# Patient Record
Sex: FEMALE | Race: WHITE | NOT HISPANIC OR LATINO | Employment: FULL TIME | URBAN - NONMETROPOLITAN AREA
[De-identification: names, ages, dates, MRNs, and addresses within clinical notes are randomized per-mention and may not be internally consistent; named-entity substitution may affect disease eponyms.]

---

## 2017-05-09 ENCOUNTER — OFFICE VISIT - GICH (OUTPATIENT)
Dept: INTERNAL MEDICINE | Facility: OTHER | Age: 52
End: 2017-05-09

## 2017-05-09 ENCOUNTER — HISTORY (OUTPATIENT)
Dept: INTERNAL MEDICINE | Facility: OTHER | Age: 52
End: 2017-05-09

## 2017-05-09 DIAGNOSIS — I10 ESSENTIAL (PRIMARY) HYPERTENSION: ICD-10-CM

## 2017-05-09 DIAGNOSIS — E66.3 OVERWEIGHT: ICD-10-CM

## 2017-05-09 DIAGNOSIS — F34.1 DYSTHYMIC DISORDER: ICD-10-CM

## 2017-05-09 DIAGNOSIS — E11.9 TYPE 2 DIABETES MELLITUS WITHOUT COMPLICATIONS (H): ICD-10-CM

## 2017-05-09 DIAGNOSIS — K21.9 GASTRO-ESOPHAGEAL REFLUX DISEASE WITHOUT ESOPHAGITIS: ICD-10-CM

## 2017-05-09 DIAGNOSIS — R06.83 SNORING: ICD-10-CM

## 2017-05-09 DIAGNOSIS — E11.42 TYPE 2 DIABETES MELLITUS WITH DIABETIC POLYNEUROPATHY (H): ICD-10-CM

## 2017-05-09 LAB
A/G RATIO - HISTORICAL: 1.6 (ref 1–2)
ALBUMIN SERPL-MCNC: 4.4 G/DL (ref 3.5–5.7)
ALP SERPL-CCNC: 61 IU/L (ref 34–104)
ALT (SGPT) - HISTORICAL: 36 IU/L (ref 7–52)
ANION GAP - HISTORICAL: 10 (ref 5–18)
AST SERPL-CCNC: 22 IU/L (ref 13–39)
BILIRUB SERPL-MCNC: 0.5 MG/DL (ref 0.3–1)
BUN SERPL-MCNC: 13 MG/DL (ref 7–25)
BUN/CREAT RATIO - HISTORICAL: 17
CALCIUM SERPL-MCNC: 9.4 MG/DL (ref 8.6–10.3)
CHLORIDE SERPLBLD-SCNC: 104 MMOL/L (ref 98–107)
CHOL/HDL RATIO - HISTORICAL: 4.34
CHOLESTEROL TOTAL: 178 MG/DL
CO2 SERPL-SCNC: 26 MMOL/L (ref 21–31)
CREAT SERPL-MCNC: 0.76 MG/DL (ref 0.7–1.3)
ESTIMATED AVERAGE GLUCOSE: 108 MG/DL
GFR IF NOT AFRICAN AMERICAN - HISTORICAL: >60 ML/MIN/1.73M2
GLOBULIN - HISTORICAL: 2.8 G/DL (ref 2–3.7)
GLUCOSE SERPL-MCNC: 123 MG/DL (ref 70–105)
HDLC SERPL-MCNC: 41 MG/DL (ref 23–92)
HEMOGLOBIN A1C MONITORING (POCT) - HISTORICAL: 5.4 % (ref 4–6.2)
LDLC SERPL CALC-MCNC: 104 MG/DL
NON-HDL CHOLESTEROL - HISTORICAL: 137 MG/DL
PATIENT STATUS - HISTORICAL: ABNORMAL
POTASSIUM SERPL-SCNC: 4.4 MMOL/L (ref 3.5–5.1)
PROT SERPL-MCNC: 7.2 G/DL (ref 6.4–8.9)
SODIUM SERPL-SCNC: 140 MMOL/L (ref 133–143)
TRIGL SERPL-MCNC: 167 MG/DL
TSH - HISTORICAL: 4.37 UIU/ML (ref 0.34–5.6)

## 2017-05-15 ENCOUNTER — HOSPITAL ENCOUNTER (OUTPATIENT)
Dept: RESPIRATORY THERAPY | Facility: OTHER | Age: 52
End: 2017-05-15
Attending: INTERNAL MEDICINE

## 2017-05-15 DIAGNOSIS — R06.83 SNORING: ICD-10-CM

## 2017-05-25 ENCOUNTER — AMBULATORY - GICH (OUTPATIENT)
Dept: FAMILY MEDICINE | Facility: OTHER | Age: 52
End: 2017-05-25

## 2017-05-25 DIAGNOSIS — E11.9 TYPE 2 DIABETES MELLITUS WITHOUT COMPLICATIONS (H): ICD-10-CM

## 2017-06-04 ENCOUNTER — COMMUNICATION - GICH (OUTPATIENT)
Dept: INTERNAL MEDICINE | Facility: OTHER | Age: 52
End: 2017-06-04

## 2017-06-04 DIAGNOSIS — R21 RASH AND OTHER NONSPECIFIC SKIN ERUPTION: ICD-10-CM

## 2017-06-26 ENCOUNTER — COMMUNICATION - GICH (OUTPATIENT)
Dept: INTERNAL MEDICINE | Facility: OTHER | Age: 52
End: 2017-06-26

## 2017-06-26 DIAGNOSIS — E11.65 TYPE 2 DIABETES MELLITUS WITH HYPERGLYCEMIA (H): ICD-10-CM

## 2017-07-02 ENCOUNTER — COMMUNICATION - GICH (OUTPATIENT)
Dept: INTERNAL MEDICINE | Facility: OTHER | Age: 52
End: 2017-07-02

## 2017-07-02 DIAGNOSIS — E11.65 TYPE 2 DIABETES MELLITUS WITH HYPERGLYCEMIA (H): ICD-10-CM

## 2017-07-27 ENCOUNTER — COMMUNICATION - GICH (OUTPATIENT)
Dept: INTERNAL MEDICINE | Facility: OTHER | Age: 52
End: 2017-07-27

## 2017-07-27 DIAGNOSIS — E11.65 TYPE 2 DIABETES MELLITUS WITH HYPERGLYCEMIA (H): ICD-10-CM

## 2017-07-28 ENCOUNTER — COMMUNICATION - GICH (OUTPATIENT)
Dept: INTERNAL MEDICINE | Facility: OTHER | Age: 52
End: 2017-07-28

## 2017-08-21 ENCOUNTER — COMMUNICATION - GICH (OUTPATIENT)
Dept: INTERNAL MEDICINE | Facility: OTHER | Age: 52
End: 2017-08-21

## 2017-08-21 DIAGNOSIS — I10 ESSENTIAL (PRIMARY) HYPERTENSION: ICD-10-CM

## 2017-08-21 DIAGNOSIS — F34.1 DYSTHYMIC DISORDER: ICD-10-CM

## 2017-10-02 ENCOUNTER — COMMUNICATION - GICH (OUTPATIENT)
Dept: INTERNAL MEDICINE | Facility: OTHER | Age: 52
End: 2017-10-02

## 2017-10-02 DIAGNOSIS — K21.9 GASTRO-ESOPHAGEAL REFLUX DISEASE WITHOUT ESOPHAGITIS: ICD-10-CM

## 2017-10-25 ENCOUNTER — COMMUNICATION - GICH (OUTPATIENT)
Dept: INTERNAL MEDICINE | Facility: OTHER | Age: 52
End: 2017-10-25

## 2017-10-25 DIAGNOSIS — E11.9 TYPE 2 DIABETES MELLITUS WITHOUT COMPLICATIONS (H): ICD-10-CM

## 2017-10-27 ENCOUNTER — COMMUNICATION - GICH (OUTPATIENT)
Dept: INTERNAL MEDICINE | Facility: OTHER | Age: 52
End: 2017-10-27

## 2017-10-30 ENCOUNTER — COMMUNICATION - GICH (OUTPATIENT)
Dept: INTERNAL MEDICINE | Facility: OTHER | Age: 52
End: 2017-10-30

## 2017-10-30 DIAGNOSIS — E11.9 TYPE 2 DIABETES MELLITUS WITHOUT COMPLICATIONS (H): ICD-10-CM

## 2017-11-29 ENCOUNTER — COMMUNICATION - GICH (OUTPATIENT)
Dept: INTERNAL MEDICINE | Facility: OTHER | Age: 52
End: 2017-11-29

## 2017-12-28 NOTE — TELEPHONE ENCOUNTER
Patient Information     Patient Name MRN Jackie Trinidad [id number] Female 1965      Telephone Encounter by Teresa Mullins at 10/25/2017  9:14 AM     Author:  Teresa Mullins Service:  (none) Author Type:  (none)     Filed:  10/25/2017  9:16 AM Encounter Date:  10/25/2017 Status:  Signed     :  Teresa Mullins            Please call patient regarding an alternate RX. The insurance will no longer cover Victoza.

## 2017-12-28 NOTE — TELEPHONE ENCOUNTER
Patient Information     Patient Name MRJackie Crawford [id number] Female 1965      Telephone Encounter by Richard Sow MD at 10/25/2017 11:47 AM     Author:  Richard Sow MD Service:  (none) Author Type:  Physician     Filed:  10/25/2017 11:47 AM Encounter Date:  10/25/2017 Status:  Signed     :  Richard Sow MD (Physician)            Stop the Victoza. I will start her back on once daily metformin, as well as once daily glipizide. Sent to her pharmacy. Hopefully she will not have any side effects with these. Recheck with me in 1 month to review blood sugars.

## 2017-12-28 NOTE — TELEPHONE ENCOUNTER
Patient Information     Patient Name MRN Sex Jackie Kemp [id number] Female 1965      Telephone Encounter by Bette Walker at 10/30/2017  6:11 PM     Author:  Bette Walker Service:  (none) Author Type:  (none)     Filed:  10/30/2017  6:13 PM Encounter Date:  10/30/2017 Status:  Signed     :  Bette Walker            Metformin SR (Glumetza) not covered by insurance.  Metformin ER (Glucophage ER) is covered by insurance.  Change Rx?  Bette Walker LPN ....................  10/30/2017   6:12 PM

## 2017-12-28 NOTE — TELEPHONE ENCOUNTER
Patient Information     Patient Name MRN Jackie Trinidad [id number] Female 1965      Telephone Encounter by Jeannette Grady LPN at 2017 11:19 AM     Author:  Jeannette Grady LPN Service:  (none) Author Type:  NURS- Licensed Practical Nurse     Filed:  2017 11:20 AM Encounter Date:  10/30/2017 Status:  Signed     :  Jeannette Grady LPN (NURS- Licensed Practical Nurse)            The pharmacy is wondering if the prescription can just be changed to the metformin ER that would be covered for the patient. PCP is out of the office until 2017.  Jeannette Grady LPN......2017  11:20 AM

## 2017-12-28 NOTE — TELEPHONE ENCOUNTER
Patient Information     Patient Name MRN Jackie Trinidad [id number] Female 1965      Telephone Encounter by Jeannette Grady LPN at 2017  8:29 AM     Author:  Jeannette Grady LPN Service:  (none) Author Type:  NURS- Licensed Practical Nurse     Filed:  2017  8:35 AM Encounter Date:  2017 Status:  Signed     :  Jeannette Grady LPN (NURS- Licensed Practical Nurse)            Contacted the patient she stated her insurance is going to be changing next week and would like a few months worth of her victoza sent in to her pharmacy for her.   Jeannette Grady LPN......2017  8:35 AM

## 2017-12-28 NOTE — TELEPHONE ENCOUNTER
Patient Information     Patient Name MRN Jackie Trinidad [id number] Female 1965      Telephone Encounter by Jeannette Grady LPN at 10/25/2017 11:28 AM     Author:  Jeannette Grady LPN Service:  (none) Author Type:  NURS- Licensed Practical Nurse     Filed:  10/25/2017 11:29 AM Encounter Date:  10/25/2017 Status:  Signed     :  Jeannette Grady LPN (NURS- Licensed Practical Nurse)            Contacted the patient and she stated it will cost her 1600 dollars a month for victoza and they did not give her any other cheaper alternatives. She is hoping Richard Sow MD can prescribed her something different. She does not care if she needs to go back on pill therapy if needed.  Jeannette Grady LPN......10/25/2017  11:29 AM

## 2017-12-28 NOTE — TELEPHONE ENCOUNTER
Patient Information     Patient Name MRN Sex Jackie Kemp [id number] Female 1965      Telephone Encounter by Glenna Potter RN at 2017 11:14 AM     Author:  Glenna Potter RN Service:  (none) Author Type:  NURS- Registered Nurse     Filed:  2017 11:16 AM Encounter Date:  2017 Status:  Signed     :  Glenna Potter RN (NURS- Registered Nurse)            Antifungals    Office visit in the past 12 months.    Last visit with KATHERINE KUNZ was on: 2017 in [facility]  Next visit with KATHERINE KUNZ is on: No future appointment listed with this provider  Next visit with Internal Medicine is on: No future appointment listed in this department    Max refill for 12 months from last office visit.    Prescription refilled per RN Medication Refill Policy.................... Glenna Potter RN ....................  2017   11:16 AM

## 2017-12-28 NOTE — TELEPHONE ENCOUNTER
Patient Information     Patient Name MRN Jackie Trinidad [id number] Female 1965      Telephone Encounter by Yi Hdz at 2017  3:07 PM     Author:  Yi Hdz Service:  (none) Author Type:  (none)     Filed:  2017  3:07 PM Encounter Date:  2017 Status:  Signed     :  Yi Hdz            [facility]- Patient has questions on her medication. She would like to talk to [facility] nurse.

## 2017-12-28 NOTE — TELEPHONE ENCOUNTER
Patient Information     Patient Name MRN Sex Jackie Kemp [id number] Female 1965      Telephone Encounter by Geno Patino RN at 2017  9:00 AM     Author:  Geno Patino RN Service:  (none) Author Type:  NURS- Registered Nurse     Filed:  2017  9:02 AM Encounter Date:  2017 Status:  Signed     :  Geno Patino RN (NURS- Registered Nurse)            Diabetic Supplies    Office visit in the past 12 months.    Last visit with KATHERINE KUNZ was on: 2017 in [facility]  Next visit with KATHERINE KUNZ is on: No future appointment listed with this provider  Next visit with Internal Medicine is on: No future appointment listed in this department    Max refill for 12 months from last office visit.  Always add ICD-9 code.    Needs not be listed on Med List to fill.  Need new RX every 12 months or if exceeds the limitations set by Medicare, then every 6 months.  Also when testing frequency is changed is there a need to obtain a new order.  A refill request does not need to be approved by the ordering physician-a beneficiary or their caregiver may request refills.  Physicians are not required to fill out additional forms such as home testing results for suppliers or provide additional documentation unless the supplier is audited and the  is requesting such documentation.    Prescription refilled per RN Medication Refill Policy.................... GENO PATINO RN ....................  2017   9:01 AM

## 2017-12-28 NOTE — TELEPHONE ENCOUNTER
"Patient Information     Patient Name MRJackie Crawford [id number] Female 1965      Telephone Encounter by Callie Blanchard RN at 2017  7:57 AM     Author:  Callie Blanchard RN Service:  (none) Author Type:  NURS- Registered Nurse     Filed:  2017  8:21 AM Encounter Date:  2017 Status:  Signed     :  Callie Blanchard RN (NURS- Registered Nurse)            Please review and advise.     Patient will be coming to [place] and wondering if she should be seen. She is on day #7 of watery diarrhea.    Patient called stating, 3 weeks ago, she finished her Victoza and then started 2 new medications, glipizide and metformin. Patient calls today complaining of diarrhea and is on her 7th day of this. Patient states \"my bottom hurts so bad, I am wondering if I have hemorrhoids on the inside.\" Patient is not sure if the diarrhea is related to new medications since she has been on them for 3 weeks now. She also states that yesterday she had a \"marble-sized bubble near her rectum, but now it is gone.\" Patient has had external hemorrhoids in the past, but the \"pain was nothing like this is now.\" Patient says when she goes to the bathroom, her pain is at a 8/10 and then 10/10 after going.\" Patient says that if she rests and puts her feet up after going, her pain will decrease to 5/10 after about 5-7 minutes. Patient is having 10-15 watery stools a day, and waking up in the night to go. Patient starting to be afraid to eat. Patient is having no problem eating, and has so that she can take her medications. Patient feels like she is staying hydrated and urine is pale, except her first void in morning, which is normal for her. Patient said pain \"was so bad, I almost went to ER last night.\" Patient has not taken anything for pain, such as ibuprofen, but has been using Preparation H.     Reason for Disposition    [1] SEVERE diarrhea (e.g., 7 or more times / day more than normal) AND [2] " "present > 24 hours (1 day)    Answer Assessment - Initial Assessment Questions  1. DIARRHEA SEVERITY: \"How bad is the diarrhea?\" \"How many extra stools have you had in the past 24 hours than normal?\"     - MILD: Few loose or mushy BMs; increase of 1-3 stools over normal daily number of stools; mild increase in ostomy output.    - MODERATE: Increase of 4-6 stools daily over normal; moderate increase in ostomy output.    - SEVERE (or Worst Possible): Increase of 7 or more stools daily over normal; moderate increase in ostomy output; incontinence.      10-15 times a day, waking up in the night    2. ONSET: \"When did the diarrhea begin?\"       7 days ago    3. BM CONSISTENCY: \"How loose or watery is the diarrhea?\"      Watery, even with though she is eating food    4. VOMITING: \"Are you also vomiting?\" If so, ask: \"How many times in the past 24 hours?\"       Afraid to eat, got plenty of fluids in yesterday, drank water    5. ABDOMINAL PAIN: \"Are you having any abdominal pain?\" If yes: \"What does it feel like?\" (e.g., crampy, dull, intermittent, constant)       No stomach pain, no trouble eating    6. ABDOMINAL PAIN SEVERITY: If present, ask: \"How bad is the pain?\"  (e.g., Scale 1-10; mild, moderate, or severe)     - MILD (1-3): doesn't interfere with normal activities, abdomen soft and not tender to touch      - MODERATE (4-7): interferes with normal activities or awakens from sleep, tender to touch      - SEVERE (8-10): excruciating pain, doubled over, unable to do any normal activities    0    7. ORAL INTAKE: If vomiting, \"Have you been able to drink liquids?\" \"How much fluids have you had in the past 24 hours?\"  Nauseous, eats fine, has been drinking fluids and keeping them down    8. HYDRATION: \"Any signs of dehydration?\" (e.g., dry mouth [not just dry lips], too weak to stand, dizziness, new weight loss) \"When did you last urinate?\"  Denies, urine normal clear, a little more yellow with 1st void of day  OTHER " "SYMPTOMS: \"Do you have any other symptoms?\" (e.g., fever, blood in stool)  Rectal Pain: 8/10, after going, a 10/10, lays with feet up, 5-7 min later pain goes to 5    Protocols used: ADULT DIARRHEA-A-AH    Callie Blanchard RN .............. 11/29/2017  8:20 AM            "

## 2017-12-28 NOTE — TELEPHONE ENCOUNTER
Patient Information     Patient Name MRN Sex Jackie Kemp [id number] Female 1965      Telephone Encounter by Sneha Disla DO at 2017 11:44 AM     Author:  Sneha Disla DO Service:  (none) Author Type:  PHYS- Osteopathic     Filed:  2017 11:45 AM Encounter Date:  10/30/2017 Status:  Signed     :  Sneha Disla DO (PHYS- Osteopathic)            Yes. New Rx sent

## 2017-12-28 NOTE — TELEPHONE ENCOUNTER
Patient Information     Patient Name MRN Sex Jackie Kemp [id number] Female 1965      Telephone Encounter by Geno Patino RN at 2017 12:15 PM     Author:  Geno Patino RN Service:  (none) Author Type:  NURS- Registered Nurse     Filed:  2017 12:16 PM Encounter Date:  2017 Status:  Signed     :  Geno Patino RN (NURS- Registered Nurse)            Depression-in adults 18 and over  SSRI    Office visit in the past 12 months or as indicated in chart.  Should have clinic visit 1-2 months after initial prescription.    Last visit with KATHERINE KUNZ was on: 2017 in [facility]  Next visit with KATHERINE KUNZ is on: No future appointment listed with this provider  Next visit with Internal Medicine is on: No future appointment listed in this department    Max refills 12 months from last office visit or per providers notes.    Ace Inhibitors    Office visit in the past 12 months or per provider note.    Lab test requirements:  Creatinine and Potassium annually, if ordering lab, order BMP.  CREATININE (mg/dL)    Date Value   2017 0.76     POTASSIUM (mmol/L)    Date Value   2017 4.4       Max refill for 12 months from last office visit or per provider note  Prescription refilled per RN Medication Refill Policy.................... GENO PATINO RN ....................  2017   12:15 PM

## 2017-12-28 NOTE — TELEPHONE ENCOUNTER
Patient Information     Patient Name MRN Sex Jackie Kemp [id number] Female 1965      Telephone Encounter by Jeannette Grady LPN at 10/25/2017 12:33 PM     Author:  Jeannette Grady LPN Service:  (none) Author Type:  NURS- Licensed Practical Nurse     Filed:  10/25/2017 12:34 PM Encounter Date:  10/25/2017 Status:  Signed     :  Jeannette Grady LPN (NURS- Licensed Practical Nurse)            The patient was contacted and given the information below. She will call and set up a follow up for one month.  Jeannette Grady LPN......10/25/2017  12:34 PM

## 2017-12-28 NOTE — TELEPHONE ENCOUNTER
Patient Information     Patient Name MRN Jacike Trinidad [id number] Female 1965      Telephone Encounter by Glenna Potter RN at 10/25/2017  1:13 PM     Author:  Glenna Potter RN Service:  (none) Author Type:  NURS- Registered Nurse     Filed:  10/25/2017  1:20 PM Encounter Date:  10/25/2017 Status:  Signed     :  Glenna Potter RN (NURS- Registered Nurse)            eRx did not transmit correctly. Clarified with Kun Potter RN........10/25/2017 1:20 PM

## 2017-12-28 NOTE — TELEPHONE ENCOUNTER
Patient Information     Patient Name MRN Jackie Trinidad [id number] Female 1965      Telephone Encounter by Jeannette Grady LPN at 2017  9:13 AM     Author:  Jeannette Grady LPN Service:  (none) Author Type:  NURS- Licensed Practical Nurse     Filed:  2017  9:14 AM Encounter Date:  2017 Status:  Signed     :  Jeannette Grady LPN (NURS- Licensed Practical Nurse)            Contacted the patient and gave her the information below. The patient stated she would try one tablet and make a follow up appointment.  Jeannette Grady LPN......2017  9:14 AM

## 2017-12-28 NOTE — TELEPHONE ENCOUNTER
Patient Information     Patient Name MRN Jackie Trinidad [id number] Female 1965      Telephone Encounter by Jeannette Grady LPN at 2017  2:57 PM     Author:  Jeannette Grady LPN Service:  (none) Author Type:  NURS- Licensed Practical Nurse     Filed:  2017  2:58 PM Encounter Date:  2017 Status:  Signed     :  Jeannette Grady LPN (NURS- Licensed Practical Nurse)            The patient was calling to find out about her victoza prescription. The patient was notified that the refills were sent in to her pharmacy.  Jeannette Grady LPN......2017  2:58 PM

## 2017-12-28 NOTE — TELEPHONE ENCOUNTER
Patient Information     Patient Name MRN Jackie Trinidad [id number] Female 1965      Telephone Encounter by Callie Galo RN at 10/27/2017  3:30 PM     Author:  Callie Galo RN Service:  (none) Author Type:  NURS- Registered Nurse     Filed:  10/27/2017  3:32 PM Encounter Date:  10/27/2017 Status:  Signed     :  Callie Galo RN (NURS- Registered Nurse)            Fax received requesting a different formula covered by insurance. Per medication list this is a duplicate request which was changed by Richard Sow MD on 10-25-17.  Already addressed.   Callie Galo RN ....................  10/27/2017   3:32 PM

## 2017-12-28 NOTE — TELEPHONE ENCOUNTER
Patient Information     Patient Name MRN Sex Jackie Kemp [id number] Female 1965      Telephone Encounter by Geno Patino RN at 2017  8:37 AM     Author:  Geno Patino RN Service:  (none) Author Type:  NURS- Registered Nurse     Filed:  2017  8:38 AM Encounter Date:  2017 Status:  Signed     :  Geno Patino RN (NURS- Registered Nurse)            Diabetic Supplies    Office visit in the past 12 months.    Last visit with KATHERINE KUNZ was on: 2017 in [facility]  Next visit with KATHERINE KUNZ is on: No future appointment listed with this provider  Next visit with Internal Medicine is on: No future appointment listed in this department    Max refill for 12 months from last office visit.  Always add ICD-9 code.    Needs not be listed on Med List to fill.  Need new RX every 12 months or if exceeds the limitations set by Medicare, then every 6 months.  Also when testing frequency is changed is there a need to obtain a new order.  A refill request does not need to be approved by the ordering physician-a beneficiary or their caregiver may request refills.  Physicians are not required to fill out additional forms such as home testing results for suppliers or provide additional documentation unless the supplier is audited and the  is requesting such documentation.    Prescription refilled per RN Medication Refill Policy.................... GENO PATINO RN ....................  2017   8:37 AM

## 2018-01-04 NOTE — PROGRESS NOTES
Patient Information     Patient Name MRN Jackie Trinidad [id number] Female 1965      Progress Notes by Richard Sow MD at 2017  7:40 AM     Author:  Richard Sow MD Service:  (none) Author Type:  Physician     Filed:  2017  8:18 AM Encounter Date:  2017 Status:  Signed     :  Richard Sow MD (Physician)            SUBJECTIVE:    Jackie Donahue is a 51 y.o. female who presents for comprehensive review of their multiple medical problems and review of medications, renewal of medications and update on necessary health maintenance issues.      HPI Comments: She is here basically for a complete evaluation. She has type 2 diabetes mellitus. Historically, her control has been very good. She is currently on Victoza which she takes on a daily basis. She does not have any difficulties or problems administering this. Her diabetes has been well-controlled in general she thinks. She knows that she needs to have better dietary compliance and exercise. She would like to see the dietitian to review this further.    She also has a history of reflux disease. She is stable on omeprazole. She is up-to-date with colonoscopy. She is up-to-date with immunizations and mammogram. She has a history of chronic anxiety and is on Celexa. She feels as though her mood is overall doing fine and well.    She wonders if she has some adenopathy in her neck and she would like that checked. I talked to her about sleep apnea, she does apparently snore and she doesn't think that she sleeps very well.    He spent time today reconciling her medications and updating her past medical history, past surgical history, family history and social history.      Allergies     Allergen  Reactions     Celebrex [Celecoxib] *Unknown     Flagyl [Nitroimidazoles] Hives and Dyspnea   ,   Current Outpatient Prescriptions     Medication  Sig     ACCU-CHEK SMARTVIEW TEST STRIP strip CHECK GLUCOSE TWICE DAILY     BD INSULIN PEN  "NEEDLE UF 31 gauge x 5/16\" FOR USE AT HOME ONCE DAILY WITH VICTOZA     CHOLECALCIFEROL, VITAMIN D3, (VITAMIN D3 ORAL) Take 1 Tab by mouth.       citalopram (CELEXA) 20 mg tablet Take 1 tablet by mouth once daily.     ketoconazole 2% topical (NIZORAL) cream APPLY A THIN FILM OF CREAM TOPICALLY TWICE DAILY.     lancets (ACCU-CHEK FASTCLIX) Use one to check glucose everyday .02     liraglutide (VICTOZA 2-HAKAN) 0.6 mg/0.1 mL (18 mg/3 mL) injection Inject 1.2 mg subcutaneous once daily.     lisinopril (PRINIVIL; ZESTRIL) 20 mg tablet Take 1 tablet by mouth once daily.     omeprazole (PRILOSEC) 20 mg Delayed-Release capsule Take 1 capsule by mouth once daily before a meal.     No current facility-administered medications for this visit.      Medications have been reviewed by me and are current to the best of my knowledge and ability. ,   Past Medical History:     Diagnosis  Date     Anxiety state, unspecified      Depressive disorder, not elsewhere classified      Diabetes mellitus, type II (HC)      Esophageal reflux      Hypertension 11/6/2014   ,   Patient Active Problem List      Diagnosis Date Noted     Diabetic peripheral neuropathy associated with type 2 diabetes mellitus (HC) 07/19/2016     Type 2 diabetes mellitus without complication (HC) 03/14/2016     Hypertension 11/06/2014     OVERWEIGHT 05/16/2012     T M J 05/12/2011     TINEA CORPORIS 05/12/2011     ANXIETY DEPRESSION      GASTROESOPHAGEAL REFLUX DISEASE    ,   Past Surgical History:      Procedure  Laterality Date     CARPAL TUNNEL RELEASE Bilateral      CERVICAL DISKECTOMY  2008    Fusion, with Dr. Russo       CHOLECYSTECTOMY       COLONOSCOPY SCREENING  2010    Normal, neg. for occult colitis, due 2020       DILATION AND CURETTAGE  2004    Also endometrial ablation       ESOPHAGOGASTRODUODENOSCOPY  2011    Reactive gastropathy, mild to moderate refulux changes.       HYSTERECTOMY  2006    LAVH/LSO       PELVIC LAPAROSCOPY  2007    diagnostic " laparoscopy With lysis of adhesions       TUBAL LIGATION      and   Social History      Substance Use Topics        Smoking status:  Former Smoker     Types: Cigarettes     Quit date: 2002     Smokeless tobacco:  Not on file     Alcohol use  No     Family Status     Relation  Status     Mother      Father Alive     Brother Alive     Maternal Grandmother      Maternal Grandfather      Paternal Grandmother      Paternal Grandfather      Other      Other      Social History     Social History        Marital status:       Spouse name: N/A     Number of children:  N/A     Years of education:  N/A     Social History Main Topics        Smoking status:  Former Smoker     Types: Cigarettes     Quit date: 2002     Smokeless tobacco:  None     Alcohol use  No     Drug use:  No     Sexual activity:  Not Asked     Other Topics  Concern     None      Social History Narrative     , two children, working at [employer]. Lives in [place].       employed at [employer].                                           REVIEW OF SYSTEMS:  Review of Systems   Constitutional: Negative for chills, diaphoresis, fever, malaise/fatigue and weight loss.   HENT: Negative for congestion, ear pain, nosebleeds, sore throat and tinnitus.    Eyes: Negative for blurred vision, double vision, photophobia, pain, discharge and redness.   Respiratory: Negative for cough, hemoptysis, sputum production, shortness of breath and wheezing.    Cardiovascular: Negative for chest pain, palpitations, orthopnea, claudication, leg swelling and PND.   Gastrointestinal: Negative for abdominal pain, blood in stool, constipation, diarrhea, heartburn, nausea and vomiting.   Genitourinary: Negative for dysuria, flank pain and hematuria.   Musculoskeletal: Negative for back pain, joint pain, myalgias and neck pain.   Skin: Negative for itching and rash.   Neurological: Negative for dizziness, tingling, tremors, speech change, loss of  "consciousness, weakness and headaches.   Psychiatric/Behavioral: Negative for depression, hallucinations, memory loss, substance abuse and suicidal ideas. The patient is not nervous/anxious.        OBJECTIVE:  /88  Pulse 74  Ht 1.645 m (5' 4.75\")  Wt 97.1 kg (214 lb)  Breastfeeding? No  BMI 35.89 kg/m2    EXAM:   Physical Exam   Constitutional: She is oriented to person, place, and time and well-developed, well-nourished, and in no distress. No distress.   HENT:   Head: Normocephalic and atraumatic.   Right Ear: Tympanic membrane and external ear normal.   Left Ear: Tympanic membrane and external ear normal.   Nose: Nose normal.   Mouth/Throat: Oropharynx is clear and moist. No oropharyngeal exudate or posterior oropharyngeal erythema.   Eyes: Conjunctivae are normal. Pupils are equal, round, and reactive to light. Right eye exhibits no discharge. Left eye exhibits no discharge. No scleral icterus.   Neck: Normal range of motion. Neck supple. No JVD present. Carotid bruit is not present. No tracheal deviation present. No thyroid mass and no thyromegaly present.   Cardiovascular: Normal rate, regular rhythm and normal heart sounds.  Exam reveals no gallop and no friction rub.    No murmur heard.  Pulmonary/Chest: Effort normal and breath sounds normal. No respiratory distress. She has no decreased breath sounds. She has no wheezes. She has no rhonchi. She has no rales. She exhibits no tenderness.   Abdominal: Soft. Bowel sounds are normal. She exhibits no distension and no mass. There is no hepatosplenomegaly. There is no tenderness. There is no rebound and no guarding.   Musculoskeletal: Normal range of motion. She exhibits no edema or tenderness.   Lymphadenopathy:     She has no cervical adenopathy.   Neurological: She is alert and oriented to person, place, and time. She displays normal reflexes. No cranial nerve deficit. Gait normal. Coordination normal.   Skin: Skin is warm and dry. No rash noted. " She is not diaphoretic. No erythema.   Psychiatric: Affect normal.   Nursing note and vitals reviewed.      ASSESSMENT/PLAN:    ICD-10-CM    1. Type 2 diabetes mellitus without complication, without long-term current use of insulin (HC) E11.9 HEMOGLOBIN A1C MONITORING (POCT)      LIPID PANEL      COMPLETE METABOLIC PANEL      TSH      AMB CONSULT TO DIETICIAN      CANCELED: T4,FREE   2. Diabetic peripheral neuropathy associated with type 2 diabetes mellitus (HC) E11.42    3. Hypertension I10    4. Overweight E66.3    5. ANXIETY DEPRESSION F34.1    6. Gastroesophageal reflux disease without esophagitis K21.9    7. Snoring R06.83 OXIMETRY OVERNIGHT STUDY        Plan:  Aside from her weight, her exam today is really quite unremarkable. She will continue with her same medications without change. Complete lab is drawn and pending, I will send a letter with the results. Dietary consult is requested. Overnight oximetry is scheduled, I will let her know the results. She will try to work hard on exercise and weight reduction. Follow-up will be dependent on the results of her lab and how she is feeling. Of note is that I did not appreciate any abnormal adenopathy today in her neck area, she will monitor this and follow up on this if needed.

## 2018-01-04 NOTE — NURSING NOTE
Patient Information     Patient Name MRN Sex Jackie Kemp [id number] Female 1965      Nursing Note by Jeannette Grady LPN at 2017  7:40 AM     Author:  Jeannette Grady LPN Service:  (none) Author Type:  NURS- Licensed Practical Nurse     Filed:  2017  7:56 AM Encounter Date:  2017 Status:  Signed     :  Jeannette Grady LPN (NURS- Licensed Practical Nurse)            The patient is here today to have a diabetic check up and discuss recent lumps she feels in there throat.  Jeannette Grady LPN......2017  7:47 AM

## 2018-01-05 NOTE — PROGRESS NOTES
"Patient Information     Patient Name MRN Jackie Trinidad [id number] Female 1965      Progress Notes by Klinefelter, Kristin K, RD at 2017 11:30 AM     Author:  Klinefelter, Kristin K, RD Service:  (none) Author Type:  NUTR- Registered Dietitian     Filed:  2017 12:31 PM Encounter Date:  2017 Status:  Signed     :  Klinefelter, Kristin K, RD (NUTR- Registered Dietitian)            MEDICAL NUTRITION THERAPY  INITIAL VISIT      Provider: Dr. Sow    Reason for referral: DM Type 2, well controlled. Overweight, BMI 35      Assessment:  Client History: Liudmila reports that she is mostly concerned about her weight. She identifies emotional eating, especially at night. 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 DM under good control. She has quit smoking and feels good about that.   Body mass index is 35.62 kg/(m^2).  Weight today 212.4#    24hr Diet Recall:  Time: Food/Drink:   bfast Cereal or bagel   lunch Out \"$5 lunch\" = 700 calories or so   dinner At home, many convenient items   snacks Excessive intake at night   beverages Water milk, no pop     Weekly Activity:  Cardio: Resistance: Stretching:   none none none      Nutrition Diagnosis:  Energy Balance: Predicted excessive energy intake  Knowledge and Beliefs: emotional eating related to stress/behaviors as evidenced by recall, self-reporting  Patient is motivated and shows willingness to change.    Intervention:  Nutrition Prescription  Nutrition Intervention: Meals and Snacks:  General/healthful diet:  Modify composition of meals/snacks: Energy-modified diet  Carbohydrate-modified diet  Nutrition Counseling:  Strategies:  Goal setting  Self-monitoring  Problem solving  Goal: (1) 150 minutes of exercise per week. She will work on 2, 10 minute brisk walks per day at work.  (2) Meal plan and write down food choices for review. Meal plan for 1,200 calories discussed  (3) Plan 1, 150 calorie snack with " protein in the evening. Think of strategies to reduce emotional eating. We will cover this at our follow-up visit.    Education Materials Provided:   Food list for meal planning  Recipes  Snack lists    Monitoring and Evaluation:  Food Intake  Weight  Biochemical Data, Medical Tests and Procedures    Follow Up: 4 week(s)    Time spent with patient: 45 minutes.   Patient encouraged to call with further questions. Contact information provided.    Kristin K Klinefelter, RD ....................  5/25/2017   12:20 PM

## 2018-01-12 ENCOUNTER — HISTORY (OUTPATIENT)
Dept: FAMILY MEDICINE | Facility: OTHER | Age: 53
End: 2018-01-12

## 2018-01-12 ENCOUNTER — OFFICE VISIT - GICH (OUTPATIENT)
Dept: FAMILY MEDICINE | Facility: OTHER | Age: 53
End: 2018-01-12

## 2018-01-12 ENCOUNTER — COMMUNICATION - GICH (OUTPATIENT)
Dept: INTERNAL MEDICINE | Facility: OTHER | Age: 53
End: 2018-01-12

## 2018-01-12 DIAGNOSIS — R51.9 HEADACHE: ICD-10-CM

## 2018-01-12 DIAGNOSIS — R22.0 LOCALIZED SWELLING, MASS, AND LUMP OF HEAD: ICD-10-CM

## 2018-01-12 LAB
ABSOLUTE BASOPHILS - HISTORICAL: 0.1 THOU/CU MM
ABSOLUTE EOSINOPHILS - HISTORICAL: 0.2 THOU/CU MM
ABSOLUTE IMMATURE GRANULOCYTES(METAS,MYELOS,PROS) - HISTORICAL: 0 THOU/CU MM
ABSOLUTE LYMPHOCYTES - HISTORICAL: 3 THOU/CU MM (ref 0.9–2.9)
ABSOLUTE MONOCYTES - HISTORICAL: 0.5 THOU/CU MM
ABSOLUTE NEUTROPHILS - HISTORICAL: 4.8 THOU/CU MM (ref 1.7–7)
ANION GAP - HISTORICAL: 12 (ref 5–18)
BASOPHILS # BLD AUTO: 0.8 %
BUN SERPL-MCNC: 13 MG/DL (ref 7–25)
BUN/CREAT RATIO - HISTORICAL: 17
CALCIUM SERPL-MCNC: 8.3 MG/DL (ref 8.6–10.3)
CHLORIDE SERPLBLD-SCNC: 105 MMOL/L (ref 98–107)
CO2 SERPL-SCNC: 26 MMOL/L (ref 21–31)
CREAT SERPL-MCNC: 0.75 MG/DL (ref 0.7–1.3)
EOSINOPHIL NFR BLD AUTO: 2.3 %
ERYTHROCYTE [DISTWIDTH] IN BLOOD BY AUTOMATED COUNT: 12.2 % (ref 11.5–15.5)
ERYTHROCYTE [SEDIMENTATION RATE] IN BLOOD: 9 MM/HR
GFR IF NOT AFRICAN AMERICAN - HISTORICAL: >60 ML/MIN/1.73M2
GLUCOSE SERPL-MCNC: 152 MG/DL (ref 70–105)
HCT VFR BLD AUTO: 33 % (ref 33–51)
HEMOGLOBIN: 11.2 G/DL (ref 12–16)
IMMATURE GRANULOCYTES(METAS,MYELOS,PROS) - HISTORICAL: 0.2 %
LYMPHOCYTES NFR BLD AUTO: 34.7 % (ref 20–44)
MCH RBC QN AUTO: 31.2 PG (ref 26–34)
MCHC RBC AUTO-ENTMCNC: 33.9 G/DL (ref 32–36)
MCV RBC AUTO: 92 FL (ref 80–100)
MONOCYTES NFR BLD AUTO: 6.3 %
NEUTROPHILS NFR BLD AUTO: 55.7 % (ref 42–72)
PLATELET # BLD AUTO: 246 THOU/CU MM (ref 140–440)
PMV BLD: 12 FL (ref 6.5–11)
POTASSIUM SERPL-SCNC: 3.8 MMOL/L (ref 3.5–5.1)
RED BLOOD COUNT - HISTORICAL: 3.59 MIL/CU MM (ref 4–5.2)
SODIUM SERPL-SCNC: 143 MMOL/L (ref 133–143)
WHITE BLOOD COUNT - HISTORICAL: 8.6 THOU/CU MM (ref 4.5–11)

## 2018-01-12 ASSESSMENT — ANXIETY QUESTIONNAIRES
4. TROUBLE RELAXING: NOT AT ALL
7. FEELING AFRAID AS IF SOMETHING AWFUL MIGHT HAPPEN: NOT AT ALL
3. WORRYING TOO MUCH ABOUT DIFFERENT THINGS: NOT AT ALL
GAD7 TOTAL SCORE: 0
6. BECOMING EASILY ANNOYED OR IRRITABLE: NOT AT ALL
1. FEELING NERVOUS, ANXIOUS, OR ON EDGE: NOT AT ALL
5. BEING SO RESTLESS THAT IT IS HARD TO SIT STILL: NOT AT ALL
2. NOT BEING ABLE TO STOP OR CONTROL WORRYING: NOT AT ALL

## 2018-01-12 ASSESSMENT — PATIENT HEALTH QUESTIONNAIRE - PHQ9: SUM OF ALL RESPONSES TO PHQ QUESTIONS 1-9: 3

## 2018-01-17 ENCOUNTER — COMMUNICATION - GICH (OUTPATIENT)
Dept: INTERNAL MEDICINE | Facility: OTHER | Age: 53
End: 2018-01-17

## 2018-01-17 DIAGNOSIS — E11.9 TYPE 2 DIABETES MELLITUS WITHOUT COMPLICATIONS (H): ICD-10-CM

## 2018-01-17 PROBLEM — K21.9 GASTROESOPHAGEAL REFLUX DISEASE: Status: ACTIVE | Noted: 2018-01-17

## 2018-01-17 PROBLEM — F34.1 DYSTHYMIC DISORDER: Status: ACTIVE | Noted: 2018-01-17

## 2018-01-17 RX ORDER — CITALOPRAM HYDROBROMIDE 20 MG/1
20 TABLET ORAL DAILY
COMMUNITY
Start: 2017-08-22 | End: 2018-06-11

## 2018-01-17 RX ORDER — LANCETS
EACH MISCELLANEOUS
COMMUNITY
Start: 2015-03-16 | End: 2018-09-25

## 2018-01-17 RX ORDER — LIRAGLUTIDE 6 MG/ML
1.2 INJECTION SUBCUTANEOUS DAILY
COMMUNITY
Start: 2017-07-28 | End: 2018-03-19

## 2018-01-17 RX ORDER — GLIPIZIDE 5 MG/1
TABLET ORAL
COMMUNITY
Start: 2017-10-25 | End: 2018-09-25

## 2018-01-17 RX ORDER — LISINOPRIL 20 MG/1
20 TABLET ORAL DAILY
COMMUNITY
Start: 2017-08-22 | End: 2018-07-03

## 2018-01-17 RX ORDER — METFORMIN HCL 500 MG
TABLET, EXTENDED RELEASE 24 HR ORAL
COMMUNITY
Start: 2017-11-01 | End: 2018-03-19

## 2018-01-17 RX ORDER — KETOCONAZOLE 20 MG/G
CREAM TOPICAL
COMMUNITY
Start: 2017-06-06 | End: 2018-03-19

## 2018-01-18 ENCOUNTER — AMBULATORY - GICH (OUTPATIENT)
Dept: LAB | Facility: OTHER | Age: 53
End: 2018-01-18

## 2018-01-18 DIAGNOSIS — E11.9 TYPE 2 DIABETES MELLITUS WITHOUT COMPLICATIONS (H): ICD-10-CM

## 2018-01-18 LAB
ESTIMATED AVERAGE GLUCOSE: 123 MG/DL
HEMOGLOBIN A1C MONITORING (POCT) - HISTORICAL: 5.9 % (ref 4–6.2)

## 2018-01-27 VITALS
HEART RATE: 74 BPM | BODY MASS INDEX: 35.65 KG/M2 | DIASTOLIC BLOOD PRESSURE: 88 MMHG | HEIGHT: 65 IN | WEIGHT: 214 LBS | SYSTOLIC BLOOD PRESSURE: 118 MMHG

## 2018-01-27 VITALS — BODY MASS INDEX: 35.82 KG/M2 | WEIGHT: 212.4 LBS

## 2018-02-09 ENCOUNTER — OFFICE VISIT - GICH (OUTPATIENT)
Dept: INTERNAL MEDICINE | Facility: OTHER | Age: 53
End: 2018-02-09

## 2018-02-09 ENCOUNTER — HISTORY (OUTPATIENT)
Dept: INTERNAL MEDICINE | Facility: OTHER | Age: 53
End: 2018-02-09

## 2018-02-09 VITALS
BODY MASS INDEX: 36.15 KG/M2 | WEIGHT: 217 LBS | TEMPERATURE: 97.5 F | HEIGHT: 65 IN | DIASTOLIC BLOOD PRESSURE: 80 MMHG | HEART RATE: 64 BPM | SYSTOLIC BLOOD PRESSURE: 136 MMHG

## 2018-02-09 DIAGNOSIS — I10 ESSENTIAL (PRIMARY) HYPERTENSION: ICD-10-CM

## 2018-02-09 DIAGNOSIS — E11.9 TYPE 2 DIABETES MELLITUS WITHOUT COMPLICATIONS (H): ICD-10-CM

## 2018-02-09 ASSESSMENT — ANXIETY QUESTIONNAIRES
1. FEELING NERVOUS, ANXIOUS, OR ON EDGE: NOT AT ALL
2. NOT BEING ABLE TO STOP OR CONTROL WORRYING: NOT AT ALL
7. FEELING AFRAID AS IF SOMETHING AWFUL MIGHT HAPPEN: NOT AT ALL
5. BEING SO RESTLESS THAT IT IS HARD TO SIT STILL: NOT AT ALL
4. TROUBLE RELAXING: NOT AT ALL
3. WORRYING TOO MUCH ABOUT DIFFERENT THINGS: NOT AT ALL
6. BECOMING EASILY ANNOYED OR IRRITABLE: NOT AT ALL
GAD7 TOTAL SCORE: 0

## 2018-02-11 ASSESSMENT — ANXIETY QUESTIONNAIRES: GAD7 TOTAL SCORE: 0

## 2018-02-11 ASSESSMENT — PATIENT HEALTH QUESTIONNAIRE - PHQ9: SUM OF ALL RESPONSES TO PHQ QUESTIONS 1-9: 3

## 2018-02-12 VITALS
DIASTOLIC BLOOD PRESSURE: 86 MMHG | WEIGHT: 210.2 LBS | BODY MASS INDEX: 34.98 KG/M2 | SYSTOLIC BLOOD PRESSURE: 134 MMHG | HEART RATE: 72 BPM

## 2018-02-12 NOTE — TELEPHONE ENCOUNTER
Patient Information     Patient Name MRN Sex Jackie Kemp [id number] Female 1965      Telephone Encounter by Roge Garvey RN at 2018  9:11 AM     Author:  Roge Garvey RN Service:  (none) Author Type:  NURS- Registered Nurse     Filed:  2018 10:22 AM Encounter Date:  2018 Status:  Signed     :  Roge Garvey RN (NURS- Registered Nurse)            Writer was approached by HALEY Gonzalez. Was advised that patient was being seen by PUSHPA Villasenor today for face swelling and a headache. HALEY Gonzalez asked for this writer to contact patient to complete triage to see if office visit was appropriate.    Call placed to patient. Patient did not answer at this time. Writer will call at a later time.    Roge Garvey RN ....................  2018   9:14 AM

## 2018-02-12 NOTE — TELEPHONE ENCOUNTER
Patient Information     Patient Name MRN Sex Jackie Kemp [id number] Female 1965      Telephone Encounter by Roge Garvey RN at 2018 10:59 AM     Author:  Roge Garvey RN Service:  (none) Author Type:  NURS- Registered Nurse     Filed:  2018 11:02 AM Encounter Date:  2018 Status:  Signed     :  Roge Garvey RN (NURS- Registered Nurse)            Writer attempted to contact patient again at listed number x 2. Was unable to reach patient at this time. Did leave a message for patient to return this writer's call. Will await return call. Writer will close this encounter as well as patient does have an appointment scheduled for her concerns at 1430 today with PUSHPA Villasenor.    Roge Garvey RN ....................  2018   11:01 AM

## 2018-02-13 ASSESSMENT — ANXIETY QUESTIONNAIRES: GAD7 TOTAL SCORE: 0

## 2018-02-13 NOTE — PROGRESS NOTES
Patient Information     Patient Name MRN Jackie Trinidad [id number] Female 1965      Progress Notes by Richard Sow MD at 2018  8:40 AM     Author:  Richard Sow MD Service:  (none) Author Type:  Physician     Filed:  2018  8:47 AM Encounter Date:  2018 Status:  Signed     :  Richadr Sow MD (Physician)            SUBJECTIVE:    Jackie Donahue is a 52 y.o. female who presents for recheck on DM.    HPI Comments: She is here today for follow-up on her diabetes. She had diarrhea with metformin so I switched her to glipizide one daily. She seems to be doing fine with that. She has not had any further diarrhea for medication although she has had somewhat of a rough winter with recurrent episodes of viral gastroenteritis. She is currently feeling well. She does not have any hypoglycemic problems with the new medication. Her weight is stable. Her blood pressure is acceptable. Her recent A1c was excellent. Unfortunately, her  got laid off permanently and lost his insurance and they are now on her insurance. She has very poor coverage with this which makes any medical visits and tests essentially out of the pocket expenses for them.      Allergies     Allergen  Reactions     Celebrex [Celecoxib] *Unknown     Flagyl [Nitroimidazoles] Hives and Dyspnea     Metformin Diarrhea   ,   Current Outpatient Prescriptions     Medication  Sig     blood sugar diagnostic (ACCU-CHEK SMARTVIEW TEST STRIP) strip Dx code e11.9  Check glucose twice daily     CHOLECALCIFEROL, VITAMIN D3, (VITAMIN D3 ORAL) Take 1 Tab by mouth.       citalopram (CELEXA) 20 mg tablet TAKE ONE TABLET BY MOUTH ONCE DAILY     glipiZIDE (GLUCOTROL) 5 mg tablet Take 1 tablet by mouth once daily before a meal.     ketoconazole 2% topical (NIZORAL) cream APPLY A THIN FILM OF CREAM TOPICALLY TWICE DAILY.     lancets (ACCU-CHEK FASTCLIX) Use one to check glucose everyday .02     lisinopril (PRINIVIL; ZESTRIL)  20 mg tablet TAKE ONE TABLET BY MOUTH ONCE DAILY     omeprazole (PRILOSEC) 20 mg Delayed-Release capsule TAKE ONE CAPSULE BY MOUTH ONCE DAILY BEFORE A MEAL     No current facility-administered medications for this visit.      Medications have been reviewed by me and are current to the best of my knowledge and ability. ,   Past Medical History:     Diagnosis  Date     Anxiety state, unspecified      Depressive disorder, not elsewhere classified      Diabetes mellitus, type II (HC)      Esophageal reflux      Hypertension 11/6/2014    and   Patient Active Problem List      Diagnosis Date Noted     Diabetic peripheral neuropathy associated with type 2 diabetes mellitus (HC) 07/19/2016     Type 2 diabetes mellitus without complication (HC) 03/14/2016     Hypertension 11/06/2014     OVERWEIGHT 05/16/2012     T M J 05/12/2011     TINEA CORPORIS 05/12/2011     ANXIETY DEPRESSION      GASTROESOPHAGEAL REFLUX DISEASE        REVIEW OF SYSTEMS:  Review of Systems   All other systems reviewed and are negative.      OBJECTIVE:  /86 (Cuff Site: Right Arm, Position: Sitting, Cuff Size: Adult Regular)  Pulse 72  Wt 95.3 kg (210 lb 3.2 oz)  BMI 34.98 kg/m2    EXAM:   Physical Exam   Constitutional: She is well-developed, well-nourished, and in no distress. No distress.   Skin: She is not diaphoretic.   Nursing note and vitals reviewed.      ASSESSMENT/PLAN:    ICD-10-CM    1. Type 2 diabetes mellitus without complication, without long-term current use of insulin (HC) E11.9    2. Hypertension I10         Plan:  She appears to be doing well at this time. Medications will continue without change. Due to her insurance concerns, we will keep her charges and visits to a minimum as able to try to keep her expenses down. She will notify me if she has problems or concerns.

## 2018-02-13 NOTE — NURSING NOTE
Patient Information     Patient Name MRN Jackie Trinidad [id number] Female 1965      Nursing Note by Lisa Marinelli at 2018  2:30 PM     Author:  Lisa Marinelli Service:  (none) Author Type:  (none)     Filed:  2018  2:51 PM Encounter Date:  2018 Status:  Signed     :  Lisa Marinelli            Patient presents to the clinic for headache and facial swelling.  Lisa Marinelli LPN........................2018  2:40 PM    Previous A1C is at goal of <8  HEMOGLOBIN A1C MONITORING (POCT)    Date Value   2017 5.4 %   2013 6.5 % NGSP (H)     Urine microalbumin:creatine: N/A  Foot exam 2017  Eye exam 17    Tobacco User no  Patient is not on a daily aspirin  Patient is not on a Statin.  Blood pressure today of   is at the goal of <139/89 for diabetics.    Lisa Marinelli LPN..............2018 2:43 PM

## 2018-02-13 NOTE — NURSING NOTE
Patient Information     Patient Name MRN Sex Jackie Kemp [id number] Female 1965      Nursing Note by Kailey Chavez at 2018  8:40 AM     Author:  Kailey Chavez Service:  (none) Author Type:  (none)     Filed:  2018  8:30 AM Encounter Date:  2018 Status:  Signed     :  Kailey Chavez            Pt presents for follow up lab results.  Kailey Chavez

## 2018-02-13 NOTE — TELEPHONE ENCOUNTER
Patient Information     Patient Name MRN Sex Jackie Kemp [id number] Female 1965      Telephone Encounter by Jeannette Grady LPN at 2018  8:23 AM     Author:  Jeannette Grady LPN Service:  (none) Author Type:  NURS- Licensed Practical Nurse     Filed:  2018  8:23 AM Encounter Date:  2018 Status:  Signed     :  Jeannette Grady LPN (NURS- Licensed Practical Nurse)            Left a message for the patient letting her know she needs a follow up visit after the labs are done.  Jeannette Grady LPN......2018  8:23 AM

## 2018-02-13 NOTE — TELEPHONE ENCOUNTER
Patient Information     Patient Name MRN Jackie Trinidad [id number] Female 1965      Telephone Encounter by Jeannette Grady LPN at 2018  7:59 AM     Author:  Jeannette Grady LPN Service:  (none) Author Type:  NURS- Licensed Practical Nurse     Filed:  2018  8:00 AM Encounter Date:  2018 Status:  Signed     :  Jeannette Grady LPN (NURS- Licensed Practical Nurse)            Contacted the patient she reports that she was to decrease her metformin dose and come back in three weeks for lab work. The patient would like lab orders placed so she can come in today.  Jeannette Grady LPN......2018  8:00 AM

## 2018-02-13 NOTE — PROGRESS NOTES
Patient Information     Patient Name MRN Sex Jackie Kemp [id number] Female 1965      Progress Notes by Sammi Gary PA-C at 2018  2:30 PM     Author:  Sammi Gary PA-C Service:  (none) Author Type:  PHYS- Physician Assistant     Filed:  2018  3:46 PM Encounter Date:  2018 Status:  Signed     :  Sammi Gary PA-C (PHYS- Physician Assistant)            Nursing Notes:   Lisa Marinelli  2018  2:51 PM  Signed  Patient presents to the clinic for headache and facial swelling.  Lisa Marinelli LPN........................2018  2:40 PM    Previous A1C is at goal of <8  HEMOGLOBIN A1C MONITORING (POCT)    Date Value   2017 5.4 %   2013 6.5 % NGSP (H)     Urine microalbumin:creatine: N/A  Foot exam 2017  Eye exam 17    Tobacco User no  Patient is not on a daily aspirin  Patient is not on a Statin.  Blood pressure today of   is at the goal of <139/89 for diabetics.    Lisa Marinelli LPN..............2018 2:43 PM      HPI:    Jackie Donahue is a 52 y.o. female who presents for headache and facial swelling. Chin swollen.  Bags under eye.  Stomach flu ended . Felt good last week. Now this is causing her symptoms.  HA in temples. No throat closure.  Cuttyhunk feels stuck in chest.  Ear pain a few days ago.  No hx sinus infections.  NO runny nose, mucous.  No chest pain, palpitations, problems breathing. Whole face feels swollen.  Head feels pressurized when lay on back.  Laying on side is better. NO redness of skin. Cheeks felt warm yesterday. No recent tick bites.  No body aches, muscle aches.  Use ibuprofen and tylenol for headaches. Helps. No teeth pain now.  No jaw pain.No pulsating arteries on temples.  No recent cough or cold symptoms. She did have the stomach flu 2 weeks ago however symptoms have been resolving. Diarrhea has been coming down. No constipation concerns. No throat closure or tenderness appreciated. She is having  "headaches around her temporal region. No pulsating arteries appreciated. No history of migraines in the past. No vision or hearing concerns. No dental concerns. No shortness of breath or wheezing. Eating and drinking well. Staying well-hydrated. This swelling around her eyes is worse in the morning when she wakes up.    Past Medical History:     Diagnosis  Date     Anxiety state, unspecified      Depressive disorder, not elsewhere classified      Diabetes mellitus, type II (HC)      Esophageal reflux      Hypertension 11/6/2014       Past Surgical History:      Procedure  Laterality Date     CARPAL TUNNEL RELEASE Bilateral      CERVICAL DISKECTOMY  2008    Fusion, with Dr. Russo       CHOLECYSTECTOMY       COLONOSCOPY SCREENING  2010    Normal, neg. for occult colitis, due 2020       DILATION AND CURETTAGE  2004    Also endometrial ablation       ESOPHAGOGASTRODUODENOSCOPY  2011    Reactive gastropathy, mild to moderate refulux changes.       HYSTERECTOMY  2006    LAVH/LSO       PELVIC LAPAROSCOPY  2007    diagnostic laparoscopy With lysis of adhesions       TUBAL LIGATION         Social History      Substance Use Topics        Smoking status:  Former Smoker     Types: Cigarettes     Quit date: 2/28/2002     Smokeless tobacco:  Never Used     Alcohol use  No       Current Outpatient Prescriptions       Medication  Sig Dispense Refill     BD INSULIN PEN NEEDLE UF 31 gauge x 5/16\" USE ONE  ONCE DAILY WITH  VICTOZA. 100 Each 1     blood sugar diagnostic (ACCU-CHEK SMARTVIEW TEST STRIP) strip Dx code e11.9  Check glucose twice daily 200 Strip 3     CHOLECALCIFEROL, VITAMIN D3, (VITAMIN D3 ORAL) Take 1 Tab by mouth.         citalopram (CELEXA) 20 mg tablet TAKE ONE TABLET BY MOUTH ONCE DAILY 90 tablet 2     glipiZIDE (GLUCOTROL) 5 mg tablet Take 1 tablet by mouth once daily before a meal. 90 tablet 3     ketoconazole 2% topical (NIZORAL) cream APPLY A THIN FILM OF CREAM TOPICALLY TWICE DAILY. 15 g 3     lancets " "(ACCU-CHEK FASTCLIX) Use one to check glucose everyday .02 102 Each 3     lisinopril (PRINIVIL; ZESTRIL) 20 mg tablet TAKE ONE TABLET BY MOUTH ONCE DAILY 90 tablet 2     omeprazole (PRILOSEC) 20 mg Delayed-Release capsule TAKE ONE CAPSULE BY MOUTH ONCE DAILY BEFORE A MEAL 90 capsule 2     No current facility-administered medications for this visit.      Medications have been reviewed by me and are current to the best of my knowledge and ability.      Allergies     Allergen  Reactions     Celebrex [Celecoxib] *Unknown     Flagyl [Nitroimidazoles] Hives and Dyspnea       REVIEW OF SYSTEMS:  Refer to HPI.    EXAM:   Vitals:    /80 (Cuff Site: Right Arm, Position: Sitting, Cuff Size: Adult Regular)  Pulse 64  Temp 97.5  F (36.4  C) (Tympanic)  Ht 1.651 m (5' 5\")  Wt 98.4 kg (217 lb)  Breastfeeding? No  BMI 36.11 kg/m2    General Appearance: Pleasant, alert, appropriate appearance for age. No acute distress  Head Exam: Normal. Normocephalic, atraumatic. No pulsating arteries appreciated on the temples.  Eye Exam:  Normal external eye, conjunctiva, lids, cornea. PRASHANTH.  Ear Exam: Normal TM's bilaterally, normal grey, and translucent. Normal auditory canals and external ears. Non-tender.   OroPharynx Exam:  Dental hygiene adequate. Normal buccal mucosa. Normal pharynx.  Neck Exam:  Supple, no masses or nodes.  Chest/Respiratory Exam: Normal chest wall and respirations. Clear to auscultation.  Cardiovascular Exam: Regular rate and rhythm. S1, S2, no murmur, click, gallop, or rubs.  Gastrointestinal Exam: Soft, non-tender, no masses or organomegaly. Normal BS x 4.  Skin: no rash or abnormalities  Neurologic Exam: Nonfocal, normal gross motor, tone coordination and no tremor.  Psychiatric Exam: Alert and oriented - appropriate affect.    PHQ Depression Screen  Date of PHQ exam: 01/12/18  Over the last 2 weeks, how often have you been bothered by any of the following problems?  1. Little interest or pleasure " in doing things: 0 - Not at all  2. Feeling down, depressed, or hopeless: 0 - Not at all  3. Trouble falling or staying asleep, or sleeping too much: 0 - Not at all  4. Feeling tired or having little energy: 0 - Not at all  5. Poor appetite or overeating: 3 - Nearly every day  6. Feeling bad about yourself - or that you are a failure or have let yourself or your family down: 0 - Not at all  7. Trouble concentrating on things, such as reading the newspaper or watching television: 0 - Not at all  8. Moving or speaking so slowly that other people could have noticed. Or the opposite - being so fidgety or restless that you have been moving around a lot more than usual: 0 - Not at all  9. Thoughts that you would be better off dead, or of hurting yourself in some way: 0 - Not at all    PHQ-9 TOTAL SCORE: 3  Depression Severity Level: none  If any answers were positive, how difficult have these problems made it for you to do your work, take care of things at home, or get along with other people: not difficult at all    Results for orders placed or performed in visit on 01/12/18      CBC WITH AUTO DIFFERENTIAL      Result  Value Ref Range    WHITE BLOOD COUNT         8.6 4.5 - 11.0 thou/cu mm    RED BLOOD COUNT           3.59 (L) 4.00 - 5.20 mil/cu mm    HEMOGLOBIN                11.2 (L) 12.0 - 16.0 g/dL    HEMATOCRIT                33.0 33.0 - 51.0 %    MCV                       92 80 - 100 fL    MCH                       31.2 26.0 - 34.0 pg    MCHC                      33.9 32.0 - 36.0 g/dL    RDW                       12.2 11.5 - 15.5 %    PLATELET COUNT            246 140 - 440 thou/cu mm    MPV                       12.0 (H) 6.5 - 11.0 fL    NEUTROPHILS               55.7 42.0 - 72.0 %    LYMPHOCYTES               34.7 20.0 - 44.0 %    MONOCYTES                 6.3 <12.0 %    EOSINOPHILS               2.3 <8.0 %    BASOPHILS                 0.8 <3.0 %    IMMATURE GRANULOCYTES(METAS,MYELOS,PROS) 0.2 %    ABSOLUTE  NEUTROPHILS      4.8 1.7 - 7.0 thou/cu mm    ABSOLUTE LYMPHOCYTES      3.0 (H) 0.9 - 2.9 thou/cu mm    ABSOLUTE MONOCYTES        0.5 <0.9 thou/cu mm    ABSOLUTE EOSINOPHILS      0.2 <0.5 thou/cu mm    ABSOLUTE BASOPHILS        0.1 <0.3 thou/cu mm    ABSOLUTE IMMATURE GRANULOCYTES(METAS,MYELOS,PROS) 0.0 <=0.3 thou/cu mm         ASSESSMENT AND PLAN:      ICD-10-CM    1. Facial swelling R22.0 CBC WITH DIFFERENTIAL      BASIC METABOLIC PANEL      SEDIMENTATION RATE      CBC WITH DIFFERENTIAL      BASIC METABOLIC PANEL      SEDIMENTATION RATE      CBC WITH AUTO DIFFERENTIAL   2. Acute nonintractable headache, unspecified headache type R51 CBC WITH DIFFERENTIAL      BASIC METABOLIC PANEL      SEDIMENTATION RATE      CBC WITH DIFFERENTIAL      BASIC METABOLIC PANEL      SEDIMENTATION RATE      CBC WITH AUTO DIFFERENTIAL       Patient had an unremarkable CBC. BMP and ESR are currently pending.    Symptoms are likely viral or allergy related. Continue to monitor symptoms.  Encouraged to take ibuprofen or tylenol for relief up to 4 times per day.  Encouraged rest and increase of fluid intake.  Encouraged to use cool or warm compresses 15 minutes at a time several times per day to decrease pain. Return to clinic in 1 week as necessary for persistent symptoms. Return to clinic or ER with any change or worsening of symptoms.    Encouraged use of benadryl 50 mg up to 4 times daily and claritin 10 mg daily. Return to clinic or ER with change/worsening of symptoms.   Monitor for chest pain, palpitations, problems breathing, throat closure, increasing headache, fevers or chills.  Gave warning signs and symptoms.    Patient Instructions   Encouraged to take ibuprofen or tylenol for relief up to 4 times per day.  Encouraged rest and increase of fluid intake.  Encouraged to use cool or warm compresses 15 minutes at a time several times per day to decrease pain. Return to clinic in 1 week as necessary for persistent symptoms. Return to  clinic or ER with any change or worsening of symptoms.    Encouraged use of benadryl 50 mg up to 4 times daily and claritin 10 mg daily. Return to clinic with change/worsening of symptoms.       Sammi Gary PA-C..................1/12/2018 2:51 PM

## 2018-02-13 NOTE — PATIENT INSTRUCTIONS
Patient Information     Patient Name MRN Sex Jackie Kemp [id number] Female 1965      Patient Instructions by Sammi Gary PA-C at 2018  2:30 PM     Author:  Sammi Gary PA-C Service:  (none) Author Type:  PHYS- Physician Assistant     Filed:  2018  3:07 PM Encounter Date:  2018 Status:  Signed     :  Sammi Gary PA-C (PHYS- Physician Assistant)            Encouraged to take ibuprofen or tylenol for relief up to 4 times per day.  Encouraged rest and increase of fluid intake.  Encouraged to use cool or warm compresses 15 minutes at a time several times per day to decrease pain. Return to clinic in 1 week as necessary for persistent symptoms. Return to clinic or ER with any change or worsening of symptoms.    Encouraged use of benadryl 50 mg up to 4 times daily and claritin 10 mg daily. Return to clinic with change/worsening of symptoms.

## 2018-02-13 NOTE — TELEPHONE ENCOUNTER
Patient Information     Patient Name MRN Jackie Trinidad [id number] Female 1965      Telephone Encounter by Kiana Fountain CNA at 2018  7:20 AM     Author:  Kiana Fountain CNA Service:  (none) Author Type:  NURS- Nurse Assist/Care Tech     Filed:  2018  7:22 AM Encounter Date:  2018 Status:  Signed     :  Kiana Fountain CNA (NURS- Nurse Assist/Care Tech)            Pt states changed meds, is fasting this morning and would like to come do her labs if possible needs orders-and wants a call this morning.    .KIANA FOUNTAIN CNA ....................  2018   7:22 AM

## 2018-02-28 ENCOUNTER — HOSPITAL ENCOUNTER (OUTPATIENT)
Dept: MAMMOGRAPHY | Facility: OTHER | Age: 53
Discharge: HOME | End: 2018-02-28
Attending: INTERNAL MEDICINE | Admitting: INTERNAL MEDICINE
Payer: COMMERCIAL

## 2018-02-28 DIAGNOSIS — Z12.39 SCREENING BREAST EXAMINATION: ICD-10-CM

## 2018-02-28 PROCEDURE — 77067 SCR MAMMO BI INCL CAD: CPT

## 2018-03-05 ENCOUNTER — HEALTH MAINTENANCE LETTER (OUTPATIENT)
Age: 53
End: 2018-03-05

## 2018-03-19 ENCOUNTER — OFFICE VISIT (OUTPATIENT)
Dept: INTERNAL MEDICINE | Facility: OTHER | Age: 53
End: 2018-03-19
Attending: INTERNAL MEDICINE
Payer: COMMERCIAL

## 2018-03-19 VITALS
DIASTOLIC BLOOD PRESSURE: 74 MMHG | SYSTOLIC BLOOD PRESSURE: 122 MMHG | HEART RATE: 72 BPM | WEIGHT: 214.2 LBS | BODY MASS INDEX: 35.64 KG/M2

## 2018-03-19 DIAGNOSIS — H66.90 ACUTE OTITIS MEDIA, UNSPECIFIED OTITIS MEDIA TYPE: Primary | ICD-10-CM

## 2018-03-19 DIAGNOSIS — J06.9 UPPER RESPIRATORY TRACT INFECTION, UNSPECIFIED TYPE: ICD-10-CM

## 2018-03-19 PROCEDURE — 99213 OFFICE O/P EST LOW 20 MIN: CPT | Performed by: INTERNAL MEDICINE

## 2018-03-19 RX ORDER — AZITHROMYCIN 250 MG/1
TABLET, FILM COATED ORAL
Qty: 6 TABLET | Refills: 0 | Status: SHIPPED | OUTPATIENT
Start: 2018-03-19 | End: 2018-05-29

## 2018-03-19 ASSESSMENT — ENCOUNTER SYMPTOMS
ENDOCRINE NEGATIVE: 1
ALLERGIC/IMMUNOLOGIC NEGATIVE: 1
EYES NEGATIVE: 1
CONSTITUTIONAL NEGATIVE: 1

## 2018-03-19 NOTE — NURSING NOTE
The patient is here today to be seen for her ears. She states they are plugged and hard to hear out of.  JUDY CARIAS LPN 3/19/2018 7:53 AM

## 2018-03-19 NOTE — MR AVS SNAPSHOT
"              After Visit Summary   3/19/2018    Jackie Donahue    MRN: [id number]           Patient Information     Date Of Birth          1965        Visit Information        Provider Department      3/19/2018 7:40 AM Richard Sow MD [facility]        Today's Diagnoses     Acute otitis media, unspecified otitis media type    -  1    Upper respiratory tract infection, unspecified type           Follow-ups after your visit        Who to contact     If you have questions or need follow up information about today's clinic visit or your schedule please contact [facility] directly at [phone].  Normal or non-critical lab and imaging results will be communicated to you by trueAnthemhart, letter or phone within 4 business days after the clinic has received the results. If you do not hear from us within 7 days, please contact the clinic through trueAnthemhart or phone. If you have a critical or abnormal lab result, we will notify you by phone as soon as possible.  Submit refill requests through [employer] or call your pharmacy and they will forward the refill request to us. Please allow 3 business days for your refill to be completed.          Additional Information About Your Visit        MyChart Information     [employer] lets you send messages to your doctor, view your test results, renew your prescriptions, schedule appointments and more. To sign up, go to [email or web address] . Click on \"Log in\" on the left side of the screen, which will take you to the Welcome page. Then click on \"Sign up Now\" on the right side of the page.     You will be asked to enter the access code listed below, as well as some personal information. Please follow the directions to create your username and password.     Your access code is: [email or web address]  Expires: 2018  8:07 AM     Your access code will  in 90 days. If you need help or a new code, please call your [facility] clinic or " [phone].        Care EveryWhere ID     This is your Care EveryWhere ID. This could be used by other organizations to access your [facility] medical records  [id number]        Your Vitals Were     Pulse Breastfeeding? BMI (Body Mass Index)             72 No 35.64 kg/m2          Blood Pressure from Last 3 Encounters:   03/19/18 122/74   02/09/18 134/86   01/12/18 136/80    Weight from Last 3 Encounters:   03/19/18 214 lb 3.2 oz (97.2 kg)   02/09/18 210 lb 3.2 oz (95.3 kg)   01/12/18 217 lb (98.4 kg)              Today, you had the following     No orders found for display         Today's Medication Changes          These changes are accurate as of 3/19/18  8:07 AM.  If you have any questions, ask your nurse or doctor.               Start taking these medicines.        Dose/Directions    azithromycin 250 MG tablet   Commonly known as:  ZITHROMAX   Used for:  Acute otitis media, unspecified otitis media type   Started by:  Richard Sow MD        Two tablets first day, then one tablet daily for four days.   Quantity:  6 tablet   Refills:  0         Stop taking these medicines if you haven't already. Please contact your care team if you have questions.     GNP VITAMIN D-400  MG-UNIT Tabs   Generic drug:  Calcium Carb-Cholecalciferol   Stopped by:  Richard Sow MD           ketoconazole 2 % cream   Commonly known as:  NIZORAL   Stopped by:  Richard Swo MD           metFORMIN 500 MG 24 hr tablet   Commonly known as:  GLUCOPHAGE-XR   Stopped by:  Richard Sow MD           VICTOZA PEN 18 MG/3ML soln   Generic drug:  liraglutide   Stopped by:  Richard Sow MD                Where to get your medicines      These medications were sent to [facility] [address] [place] [id number]     Phone:  [phone]     azithromycin 250 MG tablet                Primary Care Provider Office Phone # Fax #    Richard Sow [phone]  [phone]       [address]  [place] [address]        Equal Access to Services     ANIYAHJUAN JUSTYNA : Hadii marika navarro sadie Maddox, wakatheda luqamando, qaybta kaalmada marlen, tonio delaneyin hayaadenilson bridgesdarian donnelly la'maria luisadenilson alegria. So [facility] [phone].    ATENCIÓN: Si habla español, tiene a gonzalez disposición servicios gratuitos de asistencia lingüística. Llame al [phone].    We comply with applicable federal civil rights laws and [place] laws. We do not discriminate on the basis of race, color, national origin, age, disability, sex, sexual orientation, or gender identity.            Thank you!     Thank you for choosing [facility] AND [facility]  for your care. Our goal is always to provide you with excellent care. Hearing back from our patients is one way we can continue to improve our services. Please take a few minutes to complete the written survey that you may receive in the mail after your visit with us. Thank you!             Your Updated Medication List - Protect others around you: Learn how to safely use, store and throw away your medicines at www.disposemymeds.org.          This list is accurate as of 3/19/18  8:07 AM.  Always use your most recent med list.                   Brand Name Dispense Instructions for use Diagnosis    ACCU-CHEK SMARTVIEW VI      Dx code e11.9  Check glucose twice daily        azithromycin 250 MG tablet    ZITHROMAX    6 tablet    Two tablets first day, then one tablet daily for four days.    Acute otitis media, unspecified otitis media type       B-D U/F 31G X 8 MM   Generic drug:  insulin pen needle      USE ONE  ONCE DAILY WITH  VICTOZA.        blood glucose monitoring lancets      Use one to check glucose everyday .02        cholecalciferol 1000 UNIT tablet    vitamin D3    100 tablet    Take 1 tablet (1,000 Units) by mouth daily        citalopram 20 MG tablet    celeXA     Take 20 mg by mouth daily        glipiZIDE 5 MG tablet    GLUCOTROL     Take 1  tablet by mouth once daily before a meal.        lisinopril 20 MG tablet    PRINIVIL/ZESTRIL     Take 20 mg by mouth daily        omeprazole 20 MG CR capsule    priLOSEC     TAKE ONE CAPSULE BY MOUTH ONCE DAILY BEFORE A MEAL

## 2018-03-19 NOTE — PROGRESS NOTES
Chief Complaint:  Ear pain and URI    HPI: She comes in today with a complaint of ear pain and loss of hearing.  She developed a cold a week ago with a sore throat and a cough.  She still coughing up a fair amount of phlegm and feels a little bit short of breath but she has not had a fever.  This weekend her left ear plugged up and now this morning her right ear is plugged up.  She cannot hear very well and there is some pain associated with this.  She comes in today to have this evaluated and have treatment prescribed as necessary.    Past Medical History:   Diagnosis Date     Essential (primary) hypertension     11/6/2014     Gastro-esophageal reflux disease without esophagitis     No Comments Provided     Generalized anxiety disorder     No Comments Provided     Major depressive disorder, single episode     No Comments Provided     Type 2 diabetes mellitus without complications (H)     No Comments Provided       Past Surgical History:   Procedure Laterality Date     CHOLECYSTECTOMY      No Comments Provided     COLONOSCOPY      2010,Normal, neg. for occult colitis, due 2020     DILATION AND CURETTAGE      2004,Also endometrial ablation     ESOPHAGOSCOPY, GASTROSCOPY, DUODENOSCOPY (EGD), COMBINED      2011,Reactive gastropathy, mild to moderate refulux changes.     HYSTERECTOMY TOTAL ABDOMINAL      2006,LAVH/LSO     LAMINECT/DISCECTOMY, CERVICAL  2008     LAPAROSCOPIC TUBAL LIGATION      No Comments Provided     LAPAROSCOPY DIAGNOSTIC (GYN)      2007,diagnostic laparoscopy With lysis of adhesions     RELEASE CARPAL TUNNEL      No Comments Provided       Allergies   Allergen Reactions     Celecoxib Unknown       Current Outpatient Prescriptions   Medication Sig Dispense Refill     cholecalciferol (VITAMIN D3) 1000 UNIT tablet Take 1 tablet (1,000 Units) by mouth daily 100 tablet 3     azithromycin (ZITHROMAX) 250 MG tablet Two tablets first day, then one tablet daily for four days. 6 tablet 0     insulin pen needle  (B-D U/F) 31G X 8 MM USE ONE  ONCE DAILY WITH  VICTOZA.       Glucose Blood (ACCU-CHEK SMARTVIEW VI) Dx code e11.9  Check glucose twice daily       citalopram (CELEXA) 20 MG tablet Take 20 mg by mouth daily       glipiZIDE (GLUCOTROL) 5 MG tablet Take 1 tablet by mouth once daily before a meal.       blood glucose monitoring (ACCU-CHEK FASTCLIX) lancets Use one to check glucose everyday .02       lisinopril (PRINIVIL/ZESTRIL) 20 MG tablet Take 20 mg by mouth daily       omeprazole (PRILOSEC) 20 MG CR capsule TAKE ONE CAPSULE BY MOUTH ONCE DAILY BEFORE A MEAL         Review of Systems   Constitutional: Negative.    Eyes: Negative.    Endocrine: Negative.    Allergic/Immunologic: Negative.        Physical Exam   Constitutional: She appears well-developed and well-nourished. No distress.   HENT:   Right Ear: Tympanic membrane is injected.   Left Ear: Tympanic membrane is injected.   Nose: Right sinus exhibits no maxillary sinus tenderness and no frontal sinus tenderness. Left sinus exhibits no maxillary sinus tenderness and no frontal sinus tenderness.   Pulmonary/Chest: She has decreased breath sounds. She has wheezes. She has no rhonchi. She has no rales.   Skin: She is not diaphoretic.   Nursing note and vitals reviewed.      Assessment:        ICD-10-CM    1. Acute otitis media, unspecified otitis media type H66.90 azithromycin (ZITHROMAX) 250 MG tablet   2. Upper respiratory tract infection, unspecified type J06.9        Plan: Reviewed the findings with the patient.  I recommend antibiotic therapy at this time as well as decongestant.  She is put on Zithromax as directed.  Over-the-counter decongestant as needed.  Return or notify if not improving.

## 2018-03-20 ASSESSMENT — PATIENT HEALTH QUESTIONNAIRE - PHQ9: SUM OF ALL RESPONSES TO PHQ QUESTIONS 1-9: 0

## 2018-04-05 ENCOUNTER — TRANSFERRED RECORDS (OUTPATIENT)
Dept: HEALTH INFORMATION MANAGEMENT | Facility: OTHER | Age: 53
End: 2018-04-05

## 2018-05-29 ENCOUNTER — TELEPHONE (OUTPATIENT)
Dept: INTERNAL MEDICINE | Facility: OTHER | Age: 53
End: 2018-05-29

## 2018-05-29 DIAGNOSIS — E11.9 TYPE 2 DIABETES MELLITUS WITHOUT COMPLICATION, WITHOUT LONG-TERM CURRENT USE OF INSULIN (H): Primary | ICD-10-CM

## 2018-05-29 RX ORDER — LIRAGLUTIDE 6 MG/ML
1.2 INJECTION SUBCUTANEOUS DAILY
Qty: 15 ML | Refills: 3 | Status: SHIPPED | OUTPATIENT
Start: 2018-05-29 | End: 2019-01-22

## 2018-05-29 NOTE — TELEPHONE ENCOUNTER
The patient called and states her insurance changed and her victoza would be covered again and would like a prescription sent in to her pharmacy.  Jeannette Grady LPN on 5/29/2018 at 11:46 AM

## 2018-06-11 DIAGNOSIS — F34.1 DYSTHYMIC DISORDER: Primary | ICD-10-CM

## 2018-06-12 RX ORDER — CITALOPRAM HYDROBROMIDE 20 MG/1
TABLET ORAL
Qty: 90 TABLET | Refills: 2 | Status: SHIPPED | OUTPATIENT
Start: 2018-06-12 | End: 2019-03-31

## 2018-06-12 NOTE — TELEPHONE ENCOUNTER
This is a Refill request from: [facility]  Name of Medication and Dose:  citalopram (CELEXA) 20 mg tablet    Quantity requested:  90  Last fill date: 8/22/2017  Last Office Visit:  3/19/2018  Narcotic Agreement Signed:  NA  PCP:  Richard Sow MD  Associated Diagnosis: Dysthymic disorder    Carlee Madrigal LPN Supervisor 6/12/2018   11:02 AM

## 2018-07-03 DIAGNOSIS — I10 HYPERTENSION: Primary | ICD-10-CM

## 2018-07-06 RX ORDER — LISINOPRIL 20 MG/1
TABLET ORAL
Qty: 90 TABLET | Refills: 2 | Status: SHIPPED | OUTPATIENT
Start: 2018-07-06 | End: 2019-03-31

## 2018-07-06 NOTE — TELEPHONE ENCOUNTER
Prescription approved per [facility] [place].  Callie Galo RN on 7/6/2018 at 8:31 AM

## 2018-07-06 NOTE — TELEPHONE ENCOUNTER
Patient is out of medication. She would like a refill for her Lisinopril 20 mg. Dr. Sow is not available today . Would Dr. Cornejo be able to approve a refill? Originally sent to ER and patient  has not received an answer.

## 2018-07-18 ENCOUNTER — TELEPHONE (OUTPATIENT)
Dept: INTERNAL MEDICINE | Facility: OTHER | Age: 53
End: 2018-07-18

## 2018-07-18 NOTE — TELEPHONE ENCOUNTER
Contacted the patient she states that she was prescribed victoza on 5- cause her insurance would cover it. Since than her insurance has changed and she claims she needs a prior autorization done on this medication and that her pharmacy [facility] has sent two requests for a prior authorization. Could you look and see where this is at and give the patient a call back or let me know. Thanks.  Jeannette Grady LPN on 7/18/2018 at 10:26 AM

## 2018-07-18 NOTE — TELEPHONE ENCOUNTER
The patient was contacted and given the information below.  Jeannette Grady LPN on 7/18/2018 at 2:59 PM

## 2018-07-23 NOTE — PROGRESS NOTES
Patient Information     Patient Name  Jackie Donahue MRN  [id number] Sex  Female   1965      Letter by Richard Sow MD at      Author:  Richard Sow MD Service:  (none) Author Type:  (none)    Filed:   Date of Service:   Status:  (Other)           Jackie Donahue  [address]  [facility] [address]          May 16, 2017    Dear Liudmila,    Your oxygen saturation testing has returned showing that you may have some mild obstructive sleep apnea on occasion. Much of the night, however, your oxygen level was fine. At this point in time I am not convinced I would do any further testing unless you are having significant problems such as daytime fatigue or sleepiness. Let me know if that is the case and I will send you for more formalized sleep testing. If you have any questions about your results, let me know that as well.    Sincerely,      Richard Sow MD  Internal Medicine  [facility] and [facility]

## 2018-07-23 NOTE — PROGRESS NOTES
Patient Information     Patient Name  Jackie Donahue MRN  [id number] Sex  Female   1965      Letter by Richard Sow MD at      Author:  Richard Sow MD Service:  (none) Author Type:  (none)    Filed:   Encounter Date:  2018 Status:  (Other)           Jackie Donahue  [address]  [facility] [address]          2018    Dear Liudmila,    Following are the tests completed during your last clinic visit:    Results for orders placed or performed in visit on 18      HEMOGLOBIN A1C MONITORING (POCT)      Result  Value Ref Range    HEMOGLOBIN A1C MONITORING (POCT) 5.9 4.0 - 6.2 %    ESTIMATED AVERAGE GLUCOSE  123 mg/dL         Your diabetes test looks excellent. Congratulations on this report. I want you to schedule a follow-up visit at your convenience so we can review where things are at.    Sincerely,      Richard Sow MD  Internal Medicine  [facility] and [facility]

## 2018-07-24 NOTE — PROGRESS NOTES
Patient Information     Patient Name  Jackie Donahue MRN  [id number] Sex  Female   1965      Letter by Richard Sow MD at      Author:  Richard Sow MD Service:  (none) Author Type:  (none)    Filed:   Encounter Date:  2017 Status:  (Other)           Jackie Donahue  [address]  [facility] [address]          May 9, 2017    Dear Liudmila,    Following are the tests completed during your last clinic visit:    Results for orders placed or performed in visit on 17      HEMOGLOBIN A1C MONITORING (POCT)      Result  Value Ref Range    HEMOGLOBIN A1C MONITORING (POCT) 5.4 4.0 - 6.2 %    ESTIMATED AVERAGE GLUCOSE  108 mg/dL   LIPID PANEL      Result  Value Ref Range    CHOLESTEROL,TOTAL 178 <200 mg/dL    TRIGLYCERIDES 167 (H) <150 mg/dL    HDL CHOLESTEROL 41 23 - 92 mg/dL    NON-HDL CHOLESTEROL 137 <145 mg/dl    CHOL/HDL RATIO            4.34 <4.50                    LDL CHOLESTEROL 104 (H) <100 mg/dL    PATIENT STATUS            FASTING                   COMPLETE METABOLIC PANEL      Result  Value Ref Range    SODIUM 140 133 - 143 mmol/L    POTASSIUM 4.4 3.5 - 5.1 mmol/L    CHLORIDE 104 98 - 107 mmol/L    CO2,TOTAL 26 21 - 31 mmol/L    ANION GAP 10 5 - 18                    GLUCOSE 123 (H) 70 - 105 mg/dL    CALCIUM 9.4 8.6 - 10.3 mg/dL    BUN 13 7 - 25 mg/dL    CREATININE 0.76 0.70 - 1.30 mg/dL    BUN/CREAT RATIO           17                    GFR if African American >60 >60 ml/min/1.73m2    GFR if not African American >60 >60 ml/min/1.73m2    ALBUMIN 4.4 3.5 - 5.7 g/dL    PROTEIN,TOTAL 7.2 6.4 - 8.9 g/dL    GLOBULIN                  2.8 2.0 - 3.7 g/dL    A/G RATIO 1.6 1.0 - 2.0                    BILIRUBIN,TOTAL 0.5 0.3 - 1.0 mg/dL    ALK PHOSPHATASE 61 34 - 104 IU/L    ALT (SGPT) 36 7 - 52 IU/L    AST (SGOT) 22 13 - 39 IU/L   TSH      Result  Value Ref Range    TSH 4.37 0.34 - 5.60 uIU/mL         Your blood tests look fantastic. Congratulations on this excellent report and keep up the good work.  Assuming all goes well, we should recheck your diabetes in 6 months.    Sincerely,      Richard Sow MD  Internal Medicine  [facility] and [facility]

## 2018-07-31 DIAGNOSIS — E11.42 DIABETIC PERIPHERAL NEUROPATHY ASSOCIATED WITH TYPE 2 DIABETES MELLITUS (H): Primary | ICD-10-CM

## 2018-08-02 NOTE — TELEPHONE ENCOUNTER
Prescription approved per [facility] Refill Protocol.  Callie Galo RN on 8/2/2018 at 1:46 PM

## 2018-08-07 DIAGNOSIS — E11.9 TYPE 2 DIABETES MELLITUS WITHOUT COMPLICATION, WITHOUT LONG-TERM CURRENT USE OF INSULIN (H): Primary | ICD-10-CM

## 2018-08-07 DIAGNOSIS — E11.9 TYPE 2 DIABETES MELLITUS WITHOUT COMPLICATION (H): Primary | ICD-10-CM

## 2018-08-07 NOTE — TELEPHONE ENCOUNTER
Prescription approved per [facility] Refill Protocol.  Callie Galo RN on 8/7/2018 at 11:36 AM

## 2018-08-10 NOTE — TELEPHONE ENCOUNTER
Prescription approved per [facility] Refill Protocol.  LOV: 3/19/18    Ruchi Clay RN on 8/10/2018 at 8:57 AM

## 2018-09-25 ENCOUNTER — OFFICE VISIT (OUTPATIENT)
Dept: INTERNAL MEDICINE | Facility: OTHER | Age: 53
End: 2018-09-25
Attending: INTERNAL MEDICINE
Payer: COMMERCIAL

## 2018-09-25 VITALS
SYSTOLIC BLOOD PRESSURE: 108 MMHG | HEART RATE: 68 BPM | DIASTOLIC BLOOD PRESSURE: 82 MMHG | WEIGHT: 218.2 LBS | BODY MASS INDEX: 36.31 KG/M2

## 2018-09-25 DIAGNOSIS — K21.9 GASTROESOPHAGEAL REFLUX DISEASE WITHOUT ESOPHAGITIS: ICD-10-CM

## 2018-09-25 DIAGNOSIS — I10 ESSENTIAL HYPERTENSION: ICD-10-CM

## 2018-09-25 DIAGNOSIS — E11.9 TYPE 2 DIABETES MELLITUS WITHOUT COMPLICATION, WITHOUT LONG-TERM CURRENT USE OF INSULIN (H): Primary | ICD-10-CM

## 2018-09-25 DIAGNOSIS — F34.1 DYSTHYMIC DISORDER: ICD-10-CM

## 2018-09-25 LAB
ALBUMIN SERPL-MCNC: 4.6 G/DL (ref 3.5–5.7)
ALP SERPL-CCNC: 68 U/L (ref 34–104)
ALT SERPL W P-5'-P-CCNC: 61 U/L (ref 7–52)
ANION GAP SERPL CALCULATED.3IONS-SCNC: 10 MMOL/L (ref 3–14)
AST SERPL W P-5'-P-CCNC: 30 U/L (ref 13–39)
BILIRUB SERPL-MCNC: 0.7 MG/DL (ref 0.3–1)
BUN SERPL-MCNC: 11 MG/DL (ref 7–25)
CALCIUM SERPL-MCNC: 9.8 MG/DL (ref 8.6–10.3)
CHLORIDE SERPL-SCNC: 103 MMOL/L (ref 98–107)
CHOLEST SERPL-MCNC: 206 MG/DL
CO2 SERPL-SCNC: 28 MMOL/L (ref 21–31)
CREAT SERPL-MCNC: 0.78 MG/DL (ref 0.6–1.2)
GFR SERPL CREATININE-BSD FRML MDRD: 78 ML/MIN/1.7M2
GLUCOSE SERPL-MCNC: 116 MG/DL (ref 70–105)
HBA1C MFR BLD: 6.8 % (ref 4–6)
HDLC SERPL-MCNC: 42 MG/DL (ref 23–92)
LDLC SERPL CALC-MCNC: 133 MG/DL
NONHDLC SERPL-MCNC: 164 MG/DL
POTASSIUM SERPL-SCNC: 4 MMOL/L (ref 3.5–5.1)
PROT SERPL-MCNC: 7.3 G/DL (ref 6.4–8.9)
SODIUM SERPL-SCNC: 141 MMOL/L (ref 134–144)
TRIGL SERPL-MCNC: 153 MG/DL

## 2018-09-25 PROCEDURE — 99213 OFFICE O/P EST LOW 20 MIN: CPT | Performed by: INTERNAL MEDICINE

## 2018-09-25 PROCEDURE — 83036 HEMOGLOBIN GLYCOSYLATED A1C: CPT | Performed by: INTERNAL MEDICINE

## 2018-09-25 PROCEDURE — 80053 COMPREHEN METABOLIC PANEL: CPT | Performed by: INTERNAL MEDICINE

## 2018-09-25 PROCEDURE — 36415 COLL VENOUS BLD VENIPUNCTURE: CPT | Performed by: INTERNAL MEDICINE

## 2018-09-25 PROCEDURE — 80061 LIPID PANEL: CPT | Performed by: INTERNAL MEDICINE

## 2018-09-25 ASSESSMENT — ENCOUNTER SYMPTOMS
CONSTITUTIONAL NEGATIVE: 1
ENDOCRINE NEGATIVE: 1
HEMATOLOGIC/LYMPHATIC NEGATIVE: 1
ALLERGIC/IMMUNOLOGIC NEGATIVE: 1

## 2018-09-25 NOTE — PROGRESS NOTES
Chief Complaint:  Here for a DM checkup.    HPI: She is here for recheck on her diabetes.  Her blood sugars have been running higher because she has been gaining weight, not exercising and not eating as she should.  Now that she has insurance she is able to be back on the Victoza and is no longer on the glipizide or the metformin.  Metformin caused significant diarrhea so she is not able to take that.  No other complaints or problems at this time.    Past Medical History:   Diagnosis Date     Essential (primary) hypertension     11/6/2014     Gastro-esophageal reflux disease without esophagitis     No Comments Provided     Generalized anxiety disorder     No Comments Provided     Major depressive disorder, single episode     No Comments Provided     Type 2 diabetes mellitus without complications (H)     No Comments Provided       Past Surgical History:   Procedure Laterality Date     CHOLECYSTECTOMY      No Comments Provided     COLONOSCOPY      2010,Normal, neg. for occult colitis, due 2020     DILATION AND CURETTAGE      2004,Also endometrial ablation     ESOPHAGOSCOPY, GASTROSCOPY, DUODENOSCOPY (EGD), COMBINED      2011,Reactive gastropathy, mild to moderate refulux changes.     HYSTERECTOMY TOTAL ABDOMINAL      2006,LAVH/LSO     LAMINECT/DISCECTOMY, CERVICAL  2008     LAPAROSCOPIC TUBAL LIGATION      No Comments Provided     LAPAROSCOPY DIAGNOSTIC (GYN)      2007,diagnostic laparoscopy With lysis of adhesions     RELEASE CARPAL TUNNEL Bilateral        Allergies   Allergen Reactions     Celecoxib Unknown     Metformin Diarrhea       Current Outpatient Prescriptions   Medication Sig Dispense Refill     blood glucose (NO BRAND SPECIFIED) lancets standard Use to test blood sugar 2 times daily. DX: E11.9 200 each 1     blood glucose monitoring (NO BRAND SPECIFIED) test strip Use to test blood sugars two times daily. Dx code E11.9. Dispense as covered by patient's insurance. 100 strip 5     cholecalciferol (VITAMIN  D3) 1000 UNIT tablet Take 1 tablet (1,000 Units) by mouth daily 100 tablet 3     citalopram (CELEXA) 20 MG tablet TAKE ONE TABLET BY MOUTH ONCE DAILY 90 tablet 2     insulin pen needle (B-D U/F) 31G X 8 MM USE ONE  ONCE DAILY WITH  VICTOZA.       liraglutide (VICTOZA PEN) 18 MG/3ML soln Inject 1.2 mg Subcutaneous daily 15 mL 3     lisinopril (PRINIVIL/ZESTRIL) 20 MG tablet TAKE ONE TABLET BY MOUTH ONCE DAILY 90 tablet 2     omeprazole (PRILOSEC) 20 MG CR capsule TAKE ONE CAPSULE BY MOUTH ONCE DAILY BEFORE A MEAL         Social History     Social History     Marital status:      Spouse name: N/A     Number of children: N/A     Years of education: N/A     Occupational History     TELLER Other     Social History Main Topics     Smoking status: Former Smoker     Types: Cigarettes     Quit date: 2/28/2002     Smokeless tobacco: Never Used     Alcohol use No     Drug use: No      Comment: Drug use: No     Sexual activity: Not on file     Other Topics Concern     Not on file     Social History Narrative    , two children, working at [employer]. [place] in [place]   employed at [employer].       Review of Systems   Constitutional: Negative.    Endocrine: Negative.    Skin: Negative.    Allergic/Immunologic: Negative.    Hematological: Negative.        Physical Exam   Nursing note and vitals reviewed.      Assessment:      ICD-10-CM    1. Type 2 diabetes mellitus without complication, without long-term current use of insulin (H) E11.9 Hemoglobin A1c     Lipid Panel     Comprehensive Metabolic Panel     Hemoglobin A1c     Lipid Panel     Comprehensive Metabolic Panel   2. Essential hypertension I10    3. Gastroesophageal reflux disease without esophagitis K21.9    4. Dysthymic disorder F34.1        Plan: We talked about diet today and I encouraged her to do a better job with that as well as exercise.  Medications will continue the same.  Lab pending, I will send a letter with the results.  Consider  additional Victoza versus adding back glipizide depending on the results of her A1c.  Consider statin therapy.

## 2018-09-25 NOTE — LETTER
September 25, 2018      Jackie Donahue  [address] [address] [address]        Dear Jackie,    Below are the results of your recent labs:    Results for orders placed or performed in visit on 09/25/18   Hemoglobin A1c   Result Value Ref Range    Hemoglobin A1C 6.8 (H) 4.0 - 6.0 %   Lipid Panel   Result Value Ref Range    Cholesterol 206 (H) <200 mg/dL    Triglycerides 153 (H) <150 mg/dL    HDL Cholesterol 42 23 - 92 mg/dL    LDL Cholesterol Calculated 133 (H) <100 mg/dL    Non HDL Cholesterol 164 (H) <130 mg/dL   Comprehensive Metabolic Panel   Result Value Ref Range    Sodium 141 134 - 144 mmol/L    Potassium 4.0 3.5 - 5.1 mmol/L    Chloride 103 98 - 107 mmol/L    Carbon Dioxide 28 21 - 31 mmol/L    Anion Gap 10 3 - 14 mmol/L    Glucose 116 (H) 70 - 105 mg/dL    Urea Nitrogen 11 7 - 25 mg/dL    Creatinine 0.78 0.60 - 1.20 mg/dL    GFR Estimate 78 >60 mL/min/1.7m2    GFR Estimate If Black >90 >60 mL/min/1.7m2    Calcium 9.8 8.6 - 10.3 mg/dL    Bilirubin Total 0.7 0.3 - 1.0 mg/dL    Albumin 4.6 3.5 - 5.7 g/dL    Protein Total 7.3 6.4 - 8.9 g/dL    Alkaline Phosphatase 68 34 - 104 U/L    ALT 61 (H) 7 - 52 U/L    AST 30 13 - 39 U/L        Your diabetes test actually looks fine.  Congratulations on this report.  Your cholesterol level is higher than it should be.  With your history of diabetes, you should be on a cholesterol lowering medication.  Assuming that you are interested in starting on this, call the clinic and I will send in a prescription to your pharmacy.  If you have any questions about your results, feel free to contact me.  Let us plan to recheck your diabetes again in 4-6 months.    Sincerely,        Richard Sow MD  Internal Medicine  [facility] and [facility]

## 2018-09-25 NOTE — NURSING NOTE
"The patient is here today to have a diabetic check up.  Jeannette Grady LPN on 9/25/2018 at 10:45 AM  Chief Complaint   Patient presents with     Diabetes       Initial /82 (BP Location: Right arm, Patient Position: Sitting, Cuff Size: Adult Large)  Pulse 68  Wt 218 lb 3.2 oz (99 kg)  Breastfeeding? No  BMI 36.31 kg/m2 Estimated body mass index is 36.31 kg/(m^2) as calculated from the following:    Height as of 1/12/18: 5' 5\" (1.651 m).    Weight as of this encounter: 218 lb 3.2 oz (99 kg).  Medication Reconciliation: incomplete    Jeannette Grady LPN    "

## 2018-09-25 NOTE — MR AVS SNAPSHOT
After Visit Summary   9/25/2018    Jackie Donahue    MRN: [id number]           Patient Information     Date Of Birth          1965        Visit Information        Provider Department      9/25/2018 10:40 AM Richard Sow MD [facility]        Today's Diagnoses     Type 2 diabetes mellitus without complication, without long-term current use of insulin (H)    -  1    Essential hypertension        Gastroesophageal reflux disease without esophagitis        Dysthymic disorder           Follow-ups after your visit        Who to contact     If you have questions or need follow up information about today's clinic visit or your schedule please contact [facility] directly at [phone].  Normal or non-critical lab and imaging results will be communicated to you by MyChart, letter or phone within 4 business days after the clinic has received the results. If you do not hear from us within 7 days, please contact the clinic through MyChart or phone. If you have a critical or abnormal lab result, we will notify you by phone as soon as possible.  Submit refill requests through [employer] or call your pharmacy and they will forward the refill request to us. Please allow 3 business days for your refill to be completed.          Additional Information About Your Visit        Care EveryWhere ID     This is your Care EveryWhere ID. This could be used by other organizations to access your [place] medical records  [id number]        Your Vitals Were     Pulse Breastfeeding? BMI (Body Mass Index)             68 No 36.31 kg/m2          Blood Pressure from Last 3 Encounters:   09/25/18 108/82   03/19/18 122/74   02/09/18 134/86    Weight from Last 3 Encounters:   09/25/18 218 lb 3.2 oz (99 kg)   03/19/18 214 lb 3.2 oz (97.2 kg)   02/09/18 210 lb 3.2 oz (95.3 kg)                 Today's Medication Changes          These changes are accurate as of 9/25/18 11:00 AM.  If you  have any questions, ask your nurse or doctor.               These medicines have changed or have updated prescriptions.        Dose/Directions    blood glucose monitoring test strip   Commonly known as:  no brand specified   This may have changed:  Another medication with the same name was removed. Continue taking this medication, and follow the directions you see here.   Used for:  Type 2 diabetes mellitus without complication (H)   Changed by:  Richard Sow MD        Use to test blood sugars two times daily. Dx code E11.9. Dispense as covered by patient's insurance.   Quantity:  100 strip   Refills:  5         Stop taking these medicines if you haven't already. Please contact your care team if you have questions.     blood glucose monitoring lancets   Stopped by:  Richard Sow MD           blood glucose monitoring meter device kit   Commonly known as:  no brand specified   Stopped by:  Richard Sow MD           glipiZIDE 5 MG tablet   Commonly known as:  GLUCOTROL   Stopped by:  Richard Sow MD                    Primary Care Provider Office Phone # Fax #    Richard Sow [phone]       [address] [facility] [address]        Equal Access to Services     [facility]: Hadii aad ku hadasho Soomaali, waaxda luqadaha, qaybta kaalmada adeegyada, tonio restrepo . So [facility] [phone].    ATENCIÓN: Si habla español, tiene a gonzalez disposición servicios gratuitos de asistencia lingüística. Llame al [phone].    We comply with applicable federal civil rights laws and [place] laws. We do not discriminate on the basis of race, color, national origin, age, disability, sex, sexual orientation, or gender identity.            Thank you!     Thank you for choosing [facility] AND [facility]  for your care. Our goal is always to provide you with excellent care. Hearing back from our patients is one way we can continue to improve our services. Please  take a few minutes to complete the written survey that you may receive in the mail after your visit with us. Thank you!             Your Updated Medication List - Protect others around you: Learn how to safely use, store and throw away your medicines at www.disposemymeds.org.          This list is accurate as of 9/25/18 11:00 AM.  Always use your most recent med list.                   Brand Name Dispense Instructions for use Diagnosis    B-D U/F 31G X 8 MM   Generic drug:  insulin pen needle      USE ONE  ONCE DAILY WITH  VICTOZA.        blood glucose lancets standard    no brand specified    200 each    Use to test blood sugar 2 times daily. DX: E11.9    Type 2 diabetes mellitus without complication, without long-term current use of insulin (H)       blood glucose monitoring test strip    no brand specified    100 strip    Use to test blood sugars two times daily. Dx code E11.9. Dispense as covered by patient's insurance.    Type 2 diabetes mellitus without complication (H)       cholecalciferol 1000 UNIT tablet    vitamin D3    100 tablet    Take 1 tablet (1,000 Units) by mouth daily        citalopram 20 MG tablet    celeXA    90 tablet    TAKE ONE TABLET BY MOUTH ONCE DAILY    Dysthymic disorder       liraglutide 18 MG/3ML soln    VICTOZA PEN    15 mL    Inject 1.2 mg Subcutaneous daily    Type 2 diabetes mellitus without complication, without long-term current use of insulin (H)       lisinopril 20 MG tablet    PRINIVIL/ZESTRIL    90 tablet    TAKE ONE TABLET BY MOUTH ONCE DAILY    Hypertension       omeprazole 20 MG CR capsule    priLOSEC     TAKE ONE CAPSULE BY MOUTH ONCE DAILY BEFORE A MEAL

## 2018-09-26 ASSESSMENT — PATIENT HEALTH QUESTIONNAIRE - PHQ9: SUM OF ALL RESPONSES TO PHQ QUESTIONS 1-9: 0

## 2018-10-01 ENCOUNTER — TELEPHONE (OUTPATIENT)
Dept: INTERNAL MEDICINE | Facility: OTHER | Age: 53
End: 2018-10-01

## 2018-10-01 NOTE — TELEPHONE ENCOUNTER
PERDO-Pt called in and stated she received a letter stating they wanted her to call in to discuss starting a new medication. Please call pt and advise.  Ethan Miller on 10/1/2018 at 10:25 AM

## 2018-10-01 NOTE — TELEPHONE ENCOUNTER
The patient received a letter in the mail suggesting a cholesterol lowering medication. She is ok with starting the medication and would like it sent in to her pharmacy.  Jeannette Grady LPN on 10/1/2018 at 11:38 AM

## 2018-10-02 ENCOUNTER — TELEPHONE (OUTPATIENT)
Dept: INTERNAL MEDICINE | Facility: OTHER | Age: 53
End: 2018-10-02

## 2018-10-02 DIAGNOSIS — E78.5 HYPERLIPIDEMIA LDL GOAL <100: ICD-10-CM

## 2018-10-02 RX ORDER — ATORVASTATIN CALCIUM 20 MG/1
20 TABLET, FILM COATED ORAL DAILY
Qty: 90 TABLET | Refills: 3 | Status: SHIPPED | OUTPATIENT
Start: 2018-10-02 | End: 2019-10-21

## 2018-10-02 NOTE — TELEPHONE ENCOUNTER
The patient called in about the letter she received suggesting she go on a cholesterol medication. She is ok with this and would like one sent in to her pharmacy.  Jeannette Grady LPN on 10/2/2018 at 9:31 AM

## 2018-10-02 NOTE — TELEPHONE ENCOUNTER
Prescription sent.  She needs to have her cholesterol rechecked in 6 weeks and I will send her a letter with the results.

## 2018-10-24 DIAGNOSIS — E11.9 TYPE 2 DIABETES MELLITUS WITHOUT COMPLICATION (H): ICD-10-CM

## 2018-10-24 DIAGNOSIS — R21 RASH AND NONSPECIFIC SKIN ERUPTION: Primary | ICD-10-CM

## 2018-10-26 RX ORDER — KETOCONAZOLE 20 MG/G
CREAM TOPICAL
Qty: 15 G | Refills: 3 | Status: SHIPPED | OUTPATIENT
Start: 2018-10-26 | End: 2021-02-25

## 2018-10-26 RX ORDER — PEN NEEDLE, DIABETIC 31 GX5/16"
NEEDLE, DISPOSABLE MISCELLANEOUS
Qty: 100 EACH | Refills: 1 | Status: SHIPPED | OUTPATIENT
Start: 2018-10-26 | End: 2019-07-01

## 2018-10-26 NOTE — TELEPHONE ENCOUNTER
Prescription approved per [facility] Refill Protocol.  Callie Galo RN on 10/26/2018 at 2:25 PM

## 2019-01-22 ENCOUNTER — OFFICE VISIT (OUTPATIENT)
Dept: INTERNAL MEDICINE | Facility: OTHER | Age: 54
End: 2019-01-22
Attending: INTERNAL MEDICINE
Payer: COMMERCIAL

## 2019-01-22 ENCOUNTER — NURSE TRIAGE (OUTPATIENT)
Dept: INTERNAL MEDICINE | Facility: OTHER | Age: 54
End: 2019-01-22

## 2019-01-22 VITALS
WEIGHT: 215 LBS | HEART RATE: 76 BPM | DIASTOLIC BLOOD PRESSURE: 84 MMHG | RESPIRATION RATE: 18 BRPM | SYSTOLIC BLOOD PRESSURE: 132 MMHG | BODY MASS INDEX: 35.78 KG/M2

## 2019-01-22 DIAGNOSIS — E11.9 TYPE 2 DIABETES MELLITUS WITHOUT COMPLICATION, WITHOUT LONG-TERM CURRENT USE OF INSULIN (H): Primary | ICD-10-CM

## 2019-01-22 DIAGNOSIS — E78.5 HYPERLIPIDEMIA LDL GOAL <100: ICD-10-CM

## 2019-01-22 LAB
AST SERPL W P-5'-P-CCNC: 21 U/L (ref 13–39)
CHOLEST SERPL-MCNC: 148 MG/DL
HBA1C MFR BLD: 7.4 % (ref 4–6)
HDLC SERPL-MCNC: 40 MG/DL (ref 23–92)
LDLC SERPL CALC-MCNC: 72 MG/DL
NONHDLC SERPL-MCNC: 108 MG/DL
TRIGL SERPL-MCNC: 178 MG/DL

## 2019-01-22 PROCEDURE — 83036 HEMOGLOBIN GLYCOSYLATED A1C: CPT | Performed by: INTERNAL MEDICINE

## 2019-01-22 PROCEDURE — 80061 LIPID PANEL: CPT | Performed by: INTERNAL MEDICINE

## 2019-01-22 PROCEDURE — 84450 TRANSFERASE (AST) (SGOT): CPT | Performed by: INTERNAL MEDICINE

## 2019-01-22 PROCEDURE — 99213 OFFICE O/P EST LOW 20 MIN: CPT | Performed by: INTERNAL MEDICINE

## 2019-01-22 PROCEDURE — 36415 COLL VENOUS BLD VENIPUNCTURE: CPT | Performed by: INTERNAL MEDICINE

## 2019-01-22 RX ORDER — LIRAGLUTIDE 6 MG/ML
1.8 INJECTION SUBCUTANEOUS DAILY
Qty: 9 ML | Refills: 11 | Status: SHIPPED | OUTPATIENT
Start: 2019-01-22 | End: 2020-04-06

## 2019-01-22 ASSESSMENT — PATIENT HEALTH QUESTIONNAIRE - PHQ9: SUM OF ALL RESPONSES TO PHQ QUESTIONS 1-9: 0

## 2019-01-22 ASSESSMENT — ENCOUNTER SYMPTOMS
CONSTITUTIONAL NEGATIVE: 1
ENDOCRINE NEGATIVE: 1
ALLERGIC/IMMUNOLOGIC NEGATIVE: 1
EYES NEGATIVE: 1

## 2019-01-22 NOTE — PROGRESS NOTES
Chief Complaint: Follow-up on diabetes.    HPI: She is here today for a follow-up on her diabetes.  She is on Victoza 1.2 mg.  She is stressed lately and she is a stress eater.  This is causing her blood sugars to be higher.  She does not tolerate metformin as it caused diarrhea.  She had been on a sulfonylurea in the past but we have since discontinued that.  Her last A1c was actually not too bad.  We did start her on statin therapy last visit and she needs a recheck on that.  It is not causing her any problems.    Past Medical History:   Diagnosis Date     Essential (primary) hypertension     11/6/2014     Gastro-esophageal reflux disease without esophagitis     No Comments Provided     Generalized anxiety disorder     No Comments Provided     Major depressive disorder, single episode     No Comments Provided     Type 2 diabetes mellitus without complications (H)     No Comments Provided       Past Surgical History:   Procedure Laterality Date     CHOLECYSTECTOMY      No Comments Provided     COLONOSCOPY  01/01/2010 2010,Normal, neg. for occult colitis, due 2020     DILATION AND CURETTAGE      2004,Also endometrial ablation     ESOPHAGOSCOPY, GASTROSCOPY, DUODENOSCOPY (EGD), COMBINED      2011,Reactive gastropathy, mild to moderate refulux changes.     HYSTERECTOMY TOTAL ABDOMINAL      2006,LAVH/LSO     LAMINECT/DISCECTOMY, CERVICAL  2008     LAPAROSCOPIC TUBAL LIGATION      No Comments Provided     LAPAROSCOPY DIAGNOSTIC (GYN)      2007,diagnostic laparoscopy With lysis of adhesions     RELEASE CARPAL TUNNEL Bilateral        Allergies   Allergen Reactions     Celecoxib Unknown     Metformin Diarrhea       Current Outpatient Medications   Medication Sig Dispense Refill     atorvastatin (LIPITOR) 20 MG tablet Take 1 tablet (20 mg) by mouth daily 90 tablet 3     B-D U/F 31G X 8 MM insulin pen needle USE ONCE DAILY WITH VICTOZA 100 each 1     blood glucose (NO BRAND SPECIFIED) lancets standard Use to test blood  sugar 2 times daily. DX: E11.9 200 each 1     blood glucose monitoring (NO BRAND SPECIFIED) test strip Use to test blood sugars two times daily. Dx code E11.9. Dispense as covered by patient's insurance. 100 strip 5     cholecalciferol (VITAMIN D3) 1000 UNIT tablet Take 1 tablet (1,000 Units) by mouth daily 100 tablet 3     citalopram (CELEXA) 20 MG tablet TAKE ONE TABLET BY MOUTH ONCE DAILY 90 tablet 2     ketoconazole (NIZORAL) 2 % cream APPLY A THIN FILM OF CREAM TOPICALLY TWICE DAILY. 15 g 3     liraglutide (VICTOZA PEN) 18 MG/3ML solution Inject 1.8 mg Subcutaneous daily 9 mL 11     lisinopril (PRINIVIL/ZESTRIL) 20 MG tablet TAKE ONE TABLET BY MOUTH ONCE DAILY 90 tablet 2     omeprazole (PRILOSEC) 20 MG CR capsule TAKE ONE CAPSULE BY MOUTH ONCE DAILY BEFORE A MEAL         Review of Systems   Constitutional: Negative.    Eyes: Negative.    Endocrine: Negative.    Allergic/Immunologic: Negative.        Physical Exam   Constitutional: She appears well-developed and well-nourished. No distress.   Skin: She is not diaphoretic.   Nursing note and vitals reviewed.      Assessment:        ICD-10-CM    1. Type 2 diabetes mellitus without complication, without long-term current use of insulin (H) E11.9 Hemoglobin A1c     liraglutide (VICTOZA PEN) 18 MG/3ML solution   2. Hyperlipidemia LDL goal <100 E78.5 Lipid Panel     AST       Plan: Increase Victoza to 1.8 mg.  Other medications the same including her Lipitor.  Lab pending, I will send a letter with the results.  Strongly encouraged her to get into a regular exercise program to help with her stress as well as her weight and diabetic control.

## 2019-01-22 NOTE — NURSING NOTE
"The patient is here today to be seen for high blood sugars.  Jeannette Grady on 1/22/2019 at 8:42 AM  Chief Complaint   Patient presents with     Diabetes       Initial /84 (BP Location: Right arm, Patient Position: Sitting, Cuff Size: Adult Regular)   Pulse 76   Resp 18   Wt 97.5 kg (215 lb)   BMI 35.78 kg/m   Estimated body mass index is 35.78 kg/m  as calculated from the following:    Height as of 1/12/18: 1.651 m (5' 5\").    Weight as of this encounter: 97.5 kg (215 lb).  Medication Reconciliation: complete    Jeannette Grady    "

## 2019-01-22 NOTE — TELEPHONE ENCOUNTER
Left message for patient to call back or schedule with Dr. Sow as she is due for diabetic follow-up. Callie Galo RN on 1/22/2019 at 7:44 AM

## 2019-01-22 NOTE — LETTER
January 22, 2019      Jackie Donahue  [address] [address] [address]        Dear Jackie,    Below are the results of your recent labs:    Results for orders placed or performed in visit on 01/22/19   Lipid Panel   Result Value Ref Range    Cholesterol 148 <200 mg/dL    Triglycerides 178 (H) <150 mg/dL    HDL Cholesterol 40 23 - 92 mg/dL    LDL Cholesterol Calculated 72 <100 mg/dL    Non HDL Cholesterol 108 <130 mg/dL   AST   Result Value Ref Range    AST 21 13 - 39 U/L   Hemoglobin A1c   Result Value Ref Range    Hemoglobin A1C 7.4 (H) 4.0 - 6.0 %        Your cholesterol looks markedly improved.  Continue on the cholesterol-lowering medication.  Your A1c is a little bit higher.  Hopefully the increased dose in your medication and your successful efforts at diet and exercise will help bring your blood sugars down.  If you have any questions about your results, feel free to contact me.  Otherwise I want you back for a recheck on your diabetes in 3 months.    Sincerely,        Richard Sow MD  Internal Medicine  [facility]

## 2019-03-08 ENCOUNTER — HOSPITAL ENCOUNTER (OUTPATIENT)
Dept: MAMMOGRAPHY | Facility: OTHER | Age: 54
Discharge: HOME | End: 2019-03-08
Attending: INTERNAL MEDICINE | Admitting: INTERNAL MEDICINE
Payer: COMMERCIAL

## 2019-03-08 DIAGNOSIS — Z12.31 VISIT FOR SCREENING MAMMOGRAM: ICD-10-CM

## 2019-03-08 PROCEDURE — 77067 SCR MAMMO BI INCL CAD: CPT

## 2019-03-11 ENCOUNTER — HOSPITAL ENCOUNTER (OUTPATIENT)
Dept: MAMMOGRAPHY | Facility: OTHER | Age: 54
End: 2019-03-11
Attending: INTERNAL MEDICINE
Payer: COMMERCIAL

## 2019-03-11 ENCOUNTER — HOSPITAL ENCOUNTER (OUTPATIENT)
Dept: ULTRASOUND IMAGING | Facility: OTHER | Age: 54
End: 2019-03-11
Attending: INTERNAL MEDICINE
Payer: COMMERCIAL

## 2019-03-11 DIAGNOSIS — R92.8 ABNORMAL MAMMOGRAM: ICD-10-CM

## 2019-03-11 PROCEDURE — 76642 ULTRASOUND BREAST LIMITED: CPT | Mod: LT

## 2019-03-11 PROCEDURE — G0279 TOMOSYNTHESIS, MAMMO: HCPCS

## 2019-03-12 ENCOUNTER — HOSPITAL ENCOUNTER (OUTPATIENT)
Dept: ULTRASOUND IMAGING | Facility: OTHER | Age: 54
Discharge: HOME | End: 2019-03-12
Attending: INTERNAL MEDICINE | Admitting: INTERNAL MEDICINE
Payer: COMMERCIAL

## 2019-03-12 ENCOUNTER — HOSPITAL ENCOUNTER (OUTPATIENT)
Dept: MAMMOGRAPHY | Facility: OTHER | Age: 54
End: 2019-03-12
Attending: INTERNAL MEDICINE
Payer: COMMERCIAL

## 2019-03-12 VITALS — SYSTOLIC BLOOD PRESSURE: 128 MMHG | DIASTOLIC BLOOD PRESSURE: 91 MMHG | HEART RATE: 68 BPM | RESPIRATION RATE: 16 BRPM

## 2019-03-12 DIAGNOSIS — R92.8 ABNORMAL MAMMOGRAM: ICD-10-CM

## 2019-03-12 PROCEDURE — 19083 BX BREAST 1ST LESION US IMAG: CPT | Mod: LT

## 2019-03-12 PROCEDURE — 25000125 ZZHC RX 250: Performed by: RADIOLOGY

## 2019-03-12 PROCEDURE — 88305 TISSUE EXAM BY PATHOLOGIST: CPT

## 2019-03-12 PROCEDURE — 40000986 MA POST PROCEDURE LEFT

## 2019-03-12 RX ORDER — LIDOCAINE HYDROCHLORIDE 10 MG/ML
10 INJECTION, SOLUTION EPIDURAL; INFILTRATION; INTRACAUDAL; PERINEURAL ONCE
Status: COMPLETED | OUTPATIENT
Start: 2019-03-12 | End: 2019-03-12

## 2019-03-12 RX ORDER — LIDOCAINE HYDROCHLORIDE AND EPINEPHRINE 10; 10 MG/ML; UG/ML
20 INJECTION, SOLUTION INFILTRATION; PERINEURAL ONCE
Status: COMPLETED | OUTPATIENT
Start: 2019-03-12 | End: 2019-03-12

## 2019-03-12 RX ADMIN — LIDOCAINE HYDROCHLORIDE,EPINEPHRINE BITARTRATE 3 ML: 10; .01 INJECTION, SOLUTION INFILTRATION; PERINEURAL at 13:25

## 2019-03-12 RX ADMIN — LIDOCAINE HYDROCHLORIDE 7 ML: 10 INJECTION, SOLUTION EPIDURAL; INFILTRATION; INTRACAUDAL; PERINEURAL at 13:25

## 2019-03-12 NOTE — PROGRESS NOTES
Patient here for ultrasound guided biopsy of left breast.  Procedure reviewed with patient by writer and radiologist, questions answered.  Time out performed prior to biopsy.  Biopsy completed by radiologist, clip placed.  Pressure held to biopsy site for 5 minutes.  Dressing consisting of 2x2 guaze, and tegaderm applied.   Post clip mammogram completed.  Discharge instructions reviewed with patient, patient verbalizes understanding of instructions.  Discharged to home in stable condition with no evidence of bleeding from biopsy site.   Beth Mcdonald RN.

## 2019-03-12 NOTE — DISCHARGE INSTRUCTIONS
"NEEDLE BIOPSY BREAST    Activity: Rest the remainder of the day. You may resume normal activity after the next day. Avoid any vigorous/strenuous physical activity for 24 hours.    Comfort: If you have discomfort or tenderness at the site you may take your usual or recommended pain medication. Do not take aspirin the day of the procedure or for 48 hours following the biopsy.    Diet: You may resume your usual diet.    Care of site: Leave ice pack in place for 4 hours, or until it is no longer cold. The ice pack is reusable and may be refrozen.  Keep your bra and the dressing on for 24 hours. Then you may remove the bandage and shower. If there are steri-strips you may remove them in 3 to 5 days.  You may have some discomfort and a small amount of bruising where the biopsy was performed. This is normal. For several days or even a couple of weeks, you may have tenderness or \"twinges\" and a tiny bump where the needle went into the skin. This can be bothersome, but is not abnormal. You can use warm moist washcloths, as this may help. Do Not Use A Heating Pad.    RETURN TO THE EMERGENCY ROOM FOR:   Shortness of breath   Rapid heart rate   If pain becomes worse    Call Your Doctor For:    A fever over 101 degrees   Increased redness, increased swelling, and/or persistent drainage/discomfort  around the site    Other: At the end of your breast biopsy, a tiny titanium clip will be inserted through the biopsy needle and placed at the biopsy site within your breast. The marker provides a landmark of the biopsy for further mammograms or surgical procedures. This marker is MRI compatible and poses no known health risks.    You will be receiving a letter in the mail from [facility] Mammography Department with your biopsy results.  In 6 months a mammogram will be needed to establish a new baseline and to recheck the area where the biopsy occurred. Our radiology department will call you to schedule an appointment.    For " "questions, problems or concerns, contact the Radiology Department at [phone].    NEEDLE BIOPSY BREAST    Activity: Rest the remainder of the day. You may resume normal activity after the next day. Avoid any vigorous/strenuous physical activity for 24 hours.    Comfort: If you have discomfort or tenderness at the site you may take your usual or recommended pain medication. Do not take aspirin the day of the procedure or for 48 hours following the biopsy.    Diet: You may resume your usual diet.    Care of site: Leave ice pack in place for 4 hours, or until it is no longer cold. The ice pack is reusable and may be refrozen.  Keep your bra and the dressing on for 24 hours. Then you may remove the bandage and shower. If there are steri-strips you may remove them in 3 to 5 days.  You may have some discomfort and a small amount of bruising where the biopsy was performed. This is normal. For several days or even a couple of weeks, you may have tenderness or \"twinges\" and a tiny bump where the needle went into the skin. This can be bothersome, but is not abnormal. You can use warm moist washcloths, as this may help. Do Not Use A Heating Pad.    RETURN TO THE EMERGENCY ROOM FOR:   Shortness of breath   Rapid heart rate   If pain becomes worse    Call Your Doctor For:    A fever over 101 degrees   Increased redness, increased swelling, and/or persistent drainage/discomfort  around the site    Other: At the end of your breast biopsy, a tiny titanium clip will be inserted through the biopsy needle and placed at the biopsy site within your breast. The marker provides a landmark of the biopsy for further mammograms or surgical procedures. This marker is MRI compatible and poses no known health risks.    You will be receiving a letter in the mail from [facility] Mammography Department with your biopsy results.  In 6 months a mammogram will be needed to establish a new baseline and to recheck the area where the biopsy occurred. " Our radiology department will call you to schedule an appointment.    For questions, problems or concerns, contact the Radiology Department at [phone].

## 2019-03-15 ENCOUNTER — OFFICE VISIT (OUTPATIENT)
Dept: INTERNAL MEDICINE | Facility: OTHER | Age: 54
End: 2019-03-15
Attending: INTERNAL MEDICINE
Payer: COMMERCIAL

## 2019-03-15 VITALS
BODY MASS INDEX: 35.61 KG/M2 | TEMPERATURE: 96.7 F | SYSTOLIC BLOOD PRESSURE: 112 MMHG | RESPIRATION RATE: 18 BRPM | WEIGHT: 214 LBS | DIASTOLIC BLOOD PRESSURE: 74 MMHG | HEART RATE: 80 BPM

## 2019-03-15 DIAGNOSIS — Z98.890 S/P BREAST BIOPSY: Primary | ICD-10-CM

## 2019-03-15 PROCEDURE — 99213 OFFICE O/P EST LOW 20 MIN: CPT | Performed by: INTERNAL MEDICINE

## 2019-03-15 ASSESSMENT — PATIENT HEALTH QUESTIONNAIRE - PHQ9: SUM OF ALL RESPONSES TO PHQ QUESTIONS 1-9: 0

## 2019-03-15 ASSESSMENT — ENCOUNTER SYMPTOMS
ENDOCRINE NEGATIVE: 1
CONSTITUTIONAL NEGATIVE: 1
ALLERGIC/IMMUNOLOGIC NEGATIVE: 1
EYES NEGATIVE: 1

## 2019-03-15 NOTE — PROGRESS NOTES
Chief Complaint: Follow-up breast biopsy.    HPI: She is here today for a follow-up on her breast biopsy.  This was reviewed with her today and was negative for any cancer and atypia.  It was totally benign with focal nonproliferative fibrocystic changes.  She is obviously quite pleased with this result.  The biopsy site is healing well with no concerns.    Past Medical History:   Diagnosis Date     Essential (primary) hypertension     11/6/2014     Gastro-esophageal reflux disease without esophagitis     No Comments Provided     Generalized anxiety disorder     No Comments Provided     Major depressive disorder, single episode     No Comments Provided     Type 2 diabetes mellitus without complications (H)     No Comments Provided       Past Surgical History:   Procedure Laterality Date     CHOLECYSTECTOMY      No Comments Provided     COLONOSCOPY  01/01/2010 2010,Normal, neg. for occult colitis, due 2020     DILATION AND CURETTAGE      2004,Also endometrial ablation     ESOPHAGOSCOPY, GASTROSCOPY, DUODENOSCOPY (EGD), COMBINED      2011,Reactive gastropathy, mild to moderate refulux changes.     HYSTERECTOMY TOTAL ABDOMINAL      2006,LAVH/LSO     LAMINECT/DISCECTOMY, CERVICAL  2008     LAPAROSCOPIC TUBAL LIGATION      No Comments Provided     LAPAROSCOPY DIAGNOSTIC (GYN)      2007,diagnostic laparoscopy With lysis of adhesions     RELEASE CARPAL TUNNEL Bilateral        Allergies   Allergen Reactions     Celecoxib Unknown     Metformin Diarrhea       Current Outpatient Medications   Medication Sig Dispense Refill     atorvastatin (LIPITOR) 20 MG tablet Take 1 tablet (20 mg) by mouth daily 90 tablet 3     B-D U/F 31G X 8 MM insulin pen needle USE ONCE DAILY WITH VICTOZA 100 each 1     blood glucose (NO BRAND SPECIFIED) lancets standard Use to test blood sugar 2 times daily. DX: E11.9 200 each 1     blood glucose monitoring (NO BRAND SPECIFIED) test strip Use to test blood sugars two times daily. Dx code E11.9.  Dispense as covered by patient's insurance. 100 strip 5     cholecalciferol (VITAMIN D3) 1000 UNIT tablet Take 1 tablet (1,000 Units) by mouth daily 100 tablet 3     citalopram (CELEXA) 20 MG tablet TAKE ONE TABLET BY MOUTH ONCE DAILY 90 tablet 2     ketoconazole (NIZORAL) 2 % cream APPLY A THIN FILM OF CREAM TOPICALLY TWICE DAILY. 15 g 3     liraglutide (VICTOZA PEN) 18 MG/3ML solution Inject 1.8 mg Subcutaneous daily 9 mL 11     lisinopril (PRINIVIL/ZESTRIL) 20 MG tablet TAKE ONE TABLET BY MOUTH ONCE DAILY 90 tablet 2     omeprazole (PRILOSEC) 20 MG CR capsule TAKE ONE CAPSULE BY MOUTH ONCE DAILY BEFORE A MEAL         Review of Systems   Constitutional: Negative.    Eyes: Negative.    Endocrine: Negative.    Allergic/Immunologic: Negative.        Physical Exam   Constitutional: She appears well-developed and well-nourished. No distress.   Skin: She is not diaphoretic.   Nursing note and vitals reviewed.      Assessment:        ICD-10-CM    1. S/P breast biopsy Z98.890        Plan: I did review and calculate her risk by of the Tanya model.  Unfortunately, it is not clear whether her mother actually had breast cancer or whether it was just lung cancer that was metastatic.  If her mother had breast cancer, her overall risk is approximately 18%.  If her mother did not have breast cancer, her overall risk is 9%.  She will try to look into this further.  If all goes well, recheck diabetes in 6 weeks.

## 2019-03-15 NOTE — NURSING NOTE
"The patient is here today to be seen for a follow up on breast biopsy.  Jeannette Grady LPN on 3/15/2019 at 2:40 PM  Chief Complaint   Patient presents with     RECHECK       Initial /74 (BP Location: Right arm, Patient Position: Sitting, Cuff Size: Adult Large)   Pulse 80   Temp 96.7  F (35.9  C) (Tympanic)   Resp 18   Wt 97.1 kg (214 lb)   Breastfeeding? No   BMI 35.61 kg/m   Estimated body mass index is 35.61 kg/m  as calculated from the following:    Height as of 1/12/18: 1.651 m (5' 5\").    Weight as of this encounter: 97.1 kg (214 lb).  Medication Reconciliation: complete    Jeannette Grady LPN    "

## 2019-03-31 DIAGNOSIS — I10 HYPERTENSION: ICD-10-CM

## 2019-03-31 DIAGNOSIS — F34.1 DYSTHYMIC DISORDER: ICD-10-CM

## 2019-04-03 RX ORDER — LISINOPRIL 20 MG/1
TABLET ORAL
Qty: 90 TABLET | Refills: 3 | Status: SHIPPED | OUTPATIENT
Start: 2019-04-03 | End: 2020-05-05

## 2019-04-03 RX ORDER — CITALOPRAM HYDROBROMIDE 20 MG/1
TABLET ORAL
Qty: 90 TABLET | Refills: 3 | Status: SHIPPED | OUTPATIENT
Start: 2019-04-03 | End: 2020-05-05

## 2019-05-20 ENCOUNTER — TELEPHONE (OUTPATIENT)
Dept: INTERNAL MEDICINE | Facility: OTHER | Age: 54
End: 2019-05-20

## 2019-05-20 DIAGNOSIS — E11.9 TYPE 2 DIABETES MELLITUS WITHOUT COMPLICATION, WITHOUT LONG-TERM CURRENT USE OF INSULIN (H): ICD-10-CM

## 2019-05-20 DIAGNOSIS — E78.5 HYPERLIPIDEMIA LDL GOAL <100: ICD-10-CM

## 2019-05-20 DIAGNOSIS — E11.9 TYPE 2 DIABETES MELLITUS WITHOUT COMPLICATION, WITHOUT LONG-TERM CURRENT USE OF INSULIN (H): Primary | ICD-10-CM

## 2019-05-20 LAB
AST SERPL W P-5'-P-CCNC: 23 U/L (ref 13–39)
CHOLEST SERPL-MCNC: 116 MG/DL
HBA1C MFR BLD: 7.2 % (ref 4–6)
HDLC SERPL-MCNC: 39 MG/DL (ref 23–92)
LDLC SERPL CALC-MCNC: 56 MG/DL
NONHDLC SERPL-MCNC: 77 MG/DL
TRIGL SERPL-MCNC: 107 MG/DL

## 2019-05-20 PROCEDURE — 36415 COLL VENOUS BLD VENIPUNCTURE: CPT | Mod: ZL | Performed by: INTERNAL MEDICINE

## 2019-05-20 PROCEDURE — 36415 COLL VENOUS BLD VENIPUNCTURE: CPT | Performed by: INTERNAL MEDICINE

## 2019-05-20 PROCEDURE — 80061 LIPID PANEL: CPT | Performed by: INTERNAL MEDICINE

## 2019-05-20 PROCEDURE — 84450 TRANSFERASE (AST) (SGOT): CPT | Performed by: INTERNAL MEDICINE

## 2019-05-20 PROCEDURE — 83036 HEMOGLOBIN GLYCOSYLATED A1C: CPT | Mod: ZL | Performed by: INTERNAL MEDICINE

## 2019-05-20 NOTE — TELEPHONE ENCOUNTER
Patient had blood drawn 5/20/19 for AST  and Lipid orders. She states she needs an A1C as well. Please place orders if this needs to be done.

## 2019-06-04 ENCOUNTER — OFFICE VISIT (OUTPATIENT)
Dept: INTERNAL MEDICINE | Facility: OTHER | Age: 54
End: 2019-06-04
Attending: INTERNAL MEDICINE
Payer: COMMERCIAL

## 2019-06-04 VITALS
DIASTOLIC BLOOD PRESSURE: 78 MMHG | RESPIRATION RATE: 18 BRPM | BODY MASS INDEX: 35.38 KG/M2 | SYSTOLIC BLOOD PRESSURE: 110 MMHG | HEART RATE: 76 BPM | WEIGHT: 212.6 LBS | TEMPERATURE: 97.6 F

## 2019-06-04 DIAGNOSIS — F43.10 POST-TRAUMATIC STRESS: ICD-10-CM

## 2019-06-04 DIAGNOSIS — B34.9 VIRAL SYNDROME: ICD-10-CM

## 2019-06-04 DIAGNOSIS — E11.9 TYPE 2 DIABETES MELLITUS WITHOUT COMPLICATION, WITHOUT LONG-TERM CURRENT USE OF INSULIN (H): Primary | ICD-10-CM

## 2019-06-04 PROCEDURE — 99214 OFFICE O/P EST MOD 30 MIN: CPT | Performed by: INTERNAL MEDICINE

## 2019-06-04 ASSESSMENT — PATIENT HEALTH QUESTIONNAIRE - PHQ9: SUM OF ALL RESPONSES TO PHQ QUESTIONS 1-9: 0

## 2019-06-04 ASSESSMENT — ENCOUNTER SYMPTOMS
ENDOCRINE NEGATIVE: 1
CONSTITUTIONAL NEGATIVE: 1
HEMATOLOGIC/LYMPHATIC NEGATIVE: 1
ALLERGIC/IMMUNOLOGIC NEGATIVE: 1

## 2019-06-04 NOTE — NURSING NOTE
"The patient is here today to be seen for a follow up on her a1c.she also has a cough along with bruising she would like looked at from a biopsy.  Jeannette Grady LPN on 6/4/2019 at 11:00 AM  Chief Complaint   Patient presents with     RECHECK       Initial /78 (BP Location: Right arm, Patient Position: Sitting, Cuff Size: Adult Large)   Pulse 76   Temp 97.6  F (36.4  C) (Tympanic)   Resp 18   Wt 96.4 kg (212 lb 9.6 oz)   Breastfeeding? No   BMI 35.38 kg/m   Estimated body mass index is 35.38 kg/m  as calculated from the following:    Height as of 1/12/18: 1.651 m (5' 5\").    Weight as of this encounter: 96.4 kg (212 lb 9.6 oz).  Medication Reconciliation: complete    Jeannette Grady LPN    "
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
## 2019-06-18 ENCOUNTER — HOSPITAL ENCOUNTER (OUTPATIENT)
Dept: MAMMOGRAPHY | Facility: OTHER | Age: 54
Discharge: HOME | End: 2019-06-18
Attending: INTERNAL MEDICINE | Admitting: INTERNAL MEDICINE
Payer: COMMERCIAL

## 2019-06-18 DIAGNOSIS — N63.20 LEFT BREAST MASS: ICD-10-CM

## 2019-06-18 DIAGNOSIS — Z09 FOLLOW-UP EXAM, 3-6 MONTHS SINCE PREVIOUS EXAM: ICD-10-CM

## 2019-06-18 PROCEDURE — G0279 TOMOSYNTHESIS, MAMMO: HCPCS

## 2019-07-01 DIAGNOSIS — E11.9 TYPE 2 DIABETES MELLITUS WITHOUT COMPLICATION (H): ICD-10-CM

## 2019-07-02 RX ORDER — PEN NEEDLE, DIABETIC 31 GX5/16"
NEEDLE, DISPOSABLE MISCELLANEOUS
Qty: 100 EACH | Refills: 1 | Status: SHIPPED | OUTPATIENT
Start: 2019-07-02 | End: 2019-12-30

## 2019-07-02 NOTE — TELEPHONE ENCOUNTER
"Requested Prescriptions   Pending Prescriptions Disp Refills     B-D U/F 31G X 8 MM insulin pen needle [Pharmacy Med Name: B-D UF III SHORT PEN NEED MIS] 100 each 1     Sig:  FOR ONCE DAILY WITH VICTOZA       Diabetic Supplies Protocol Passed - 7/1/2019  7:11 AM        Passed - Medication is active on med list        Passed - Patient is 18 years of age or older        Passed - Recent (6 mo) or future (30 days) visit within the authorizing provider's specialty     Patient had office visit in the last 6 months or has a visit in the next 30 days with authorizing provider.  See \"Patient Info\" tab in inbasket, or \"Choose Columns\" in Meds & Orders section of the refill encounter.            LOV 6/4/19    Prescription approved per [facility] Refill Protocol.    "

## 2019-08-02 DIAGNOSIS — E11.9 TYPE 2 DIABETES MELLITUS WITHOUT COMPLICATION, WITHOUT LONG-TERM CURRENT USE OF INSULIN (H): ICD-10-CM

## 2019-08-07 RX ORDER — LANCETS
EACH MISCELLANEOUS
Qty: 200 EACH | Refills: 2 | Status: SHIPPED | OUTPATIENT
Start: 2019-08-07 | End: 2020-11-16

## 2019-08-07 NOTE — TELEPHONE ENCOUNTER
Prescription approved per [facility] Protocol.  Callie Galo RN on 8/7/2019 at 8:37 AM

## 2019-08-13 ENCOUNTER — NURSE TRIAGE (OUTPATIENT)
Dept: INTERNAL MEDICINE | Facility: OTHER | Age: 54
End: 2019-08-13
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
## 2019-08-14 ENCOUNTER — OFFICE VISIT (OUTPATIENT)
Dept: INTERNAL MEDICINE | Facility: OTHER | Age: 54
End: 2019-08-14
Attending: INTERNAL MEDICINE
Payer: COMMERCIAL

## 2019-08-14 VITALS
DIASTOLIC BLOOD PRESSURE: 88 MMHG | BODY MASS INDEX: 35.71 KG/M2 | HEART RATE: 72 BPM | SYSTOLIC BLOOD PRESSURE: 126 MMHG | RESPIRATION RATE: 18 BRPM | TEMPERATURE: 97.8 F | WEIGHT: 214.6 LBS

## 2019-08-14 DIAGNOSIS — R00.0 TACHYCARDIA: Primary | ICD-10-CM

## 2019-08-14 PROCEDURE — 93000 ELECTROCARDIOGRAM COMPLETE: CPT | Performed by: INTERNAL MEDICINE

## 2019-08-14 PROCEDURE — 99214 OFFICE O/P EST MOD 30 MIN: CPT | Performed by: INTERNAL MEDICINE

## 2019-08-14 ASSESSMENT — PATIENT HEALTH QUESTIONNAIRE - PHQ9: SUM OF ALL RESPONSES TO PHQ QUESTIONS 1-9: 0

## 2019-08-14 ASSESSMENT — ENCOUNTER SYMPTOMS
CONSTITUTIONAL NEGATIVE: 1
HEMATOLOGIC/LYMPHATIC NEGATIVE: 1
ENDOCRINE NEGATIVE: 1
ALLERGIC/IMMUNOLOGIC NEGATIVE: 1

## 2019-08-14 NOTE — PROGRESS NOTES
Chief Complaint:  Fast heart rate.    HPI: Over the past 4 weeks or so she has noticed that her heart rate has been fairly high on her Fitbit.  She notes that sometimes it is high as 120 or so.  She is not really sure what to make of this but it seems to be unusual.  She does not really feel anything.  She does not have any chest pain, pressure or palpitations.  She does not have increased shortness of breath.  She does not use excessive caffeine or any other medications that might contribute to this.  She has no previous cardiac history.  She does have diabetes and is on Victoza.  She would not even really know that this was a problem other than the fact that she has the fit bit and she checks this occasionally.  She is under a lot of stress lately as has been previously outlined.  She does continue to see her therapist for this.  She is in today to have this checked out to see if it is anything more substantial that she needs to worry about.    Past Medical History:   Diagnosis Date     Essential (primary) hypertension     11/6/2014     Gastro-esophageal reflux disease without esophagitis     No Comments Provided     Generalized anxiety disorder     No Comments Provided     Major depressive disorder, single episode     No Comments Provided     Type 2 diabetes mellitus without complications (H)     No Comments Provided       Past Surgical History:   Procedure Laterality Date     CHOLECYSTECTOMY      No Comments Provided     COLONOSCOPY  01/01/2010 2010,Normal, neg. for occult colitis, due 2020     DILATION AND CURETTAGE      2004,Also endometrial ablation     ESOPHAGOSCOPY, GASTROSCOPY, DUODENOSCOPY (EGD), COMBINED      2011,Reactive gastropathy, mild to moderate refulux changes.     HYSTERECTOMY TOTAL ABDOMINAL      2006,LAVH/LSO     LAMINECT/DISCECTOMY, CERVICAL  2008     LAPAROSCOPIC TUBAL LIGATION      No Comments Provided     LAPAROSCOPY DIAGNOSTIC (GYN)      2007,diagnostic laparoscopy With lysis of  adhesions     RELEASE CARPAL TUNNEL Bilateral        Allergies   Allergen Reactions     Celecoxib Unknown     Metformin Diarrhea       Current Outpatient Medications   Medication Sig Dispense Refill     atorvastatin (LIPITOR) 20 MG tablet Take 1 tablet (20 mg) by mouth daily 90 tablet 3     B-D U/F 31G X 8 MM insulin pen needle  FOR ONCE DAILY WITH VICTOZA 100 each 1     blood glucose monitoring (NO BRAND SPECIFIED) test strip Use to test blood sugars two times daily. Dx code E11.9. Dispense as covered by patient's insurance. 100 strip 5     cholecalciferol (VITAMIN D3) 1000 UNIT tablet Take 1 tablet (1,000 Units) by mouth daily 100 tablet 3     citalopram (CELEXA) 20 MG tablet TAKE 1 TABLET BY MOUTH ONCE DAILY 90 tablet 3     ketoconazole (NIZORAL) 2 % cream APPLY A THIN FILM OF CREAM TOPICALLY TWICE DAILY. 15 g 3     liraglutide (VICTOZA PEN) 18 MG/3ML solution Inject 1.8 mg Subcutaneous daily 9 mL 11     lisinopril (PRINIVIL/ZESTRIL) 20 MG tablet TAKE 1 TABLET BY MOUTH ONCE DAILY 90 tablet 3     MICROLET LANCETS MISC USE TO TEST BLOOD SUGAR 2 TIMES DAILY. 200 each 2     omeprazole (PRILOSEC) 20 MG CR capsule TAKE ONE CAPSULE BY MOUTH ONCE DAILY BEFORE A MEAL         Social History     Socioeconomic History     Marital status:      Spouse name: Not on file     Number of children: Not on file     Years of education: Not on file     Highest education level: Not on file   Occupational History     Occupation: TELLER     Employer: OTHER   Social Needs     Financial resource strain: Not on file     Food insecurity:     Worry: Not on file     Inability: Not on file     Transportation needs:     Medical: Not on file     Non-medical: Not on file   Tobacco Use     Smoking status: Former Smoker     Types: Cigarettes     Last attempt to quit: 2002     Years since quittin.4     Smokeless tobacco: Never Used   Substance and Sexual Activity     Alcohol use: No     Alcohol/week: 0.0 oz     Drug use: No      Comment: Drug use: No     Sexual activity: Yes     Partners: Male   Lifestyle     Physical activity:     Days per week: Not on file     Minutes per session: Not on file     Stress: Not on file   Relationships     Social connections:     Talks on phone: Not on file     Gets together: Not on file     Attends Yazdanism service: Not on file     Active member of club or organization: Not on file     Attends meetings of clubs or organizations: Not on file     Relationship status: Not on file     Intimate partner violence:     Fear of current or ex partner: Not on file     Emotionally abused: Not on file     Physically abused: Not on file     Forced sexual activity: Not on file   Other Topics Concern     Parent/sibling w/ CABG, MI or angioplasty before 65F 55M? Not Asked   Social History Narrative    , two children, working at [employer]. Lives in [place].   employed at [employer].       Review of Systems   Constitutional: Negative.    Endocrine: Negative.    Skin: Negative.    Allergic/Immunologic: Negative.    Hematological: Negative.        Physical Exam   Constitutional: She is oriented to person, place, and time. She appears well-developed and well-nourished. No distress.   Neck: Normal range of motion. Neck supple. No JVD present. No tracheal deviation present. No thyromegaly present.   Cardiovascular: Normal rate, regular rhythm and normal heart sounds.   Pulmonary/Chest: Effort normal and breath sounds normal. No stridor. No respiratory distress. She has no wheezes. She has no rales.   Neurological: She is alert and oriented to person, place, and time.   Skin: Skin is warm and dry. She is not diaphoretic.   Psychiatric: She has a normal mood and affect.   Nursing note and vitals reviewed.      Assessment:      ICD-10-CM    1. Tachycardia R00.0 EKG 12-lead complete w/read - Clinics       Plan: This patient is possibly having intermittent tachycardia but this is only going by her Fitbit which of  course may be inaccurate.  Her exam today is normal.  Her EKG is normal.  I am going to confirm or deny whether she has tachycardia by having her do a 48-hour Holter monitor.  If this is unremarkable, no further testing.  If this does show significant tachycardia, we will evaluate further with thyroid functions, CBC, recheck on her diabetes, and possibly echocardiogram and other testing depending on what her monitor shows.  I will let her know the results and we will proceed as indicated.

## 2019-08-14 NOTE — NURSING NOTE
"The patient is her today to be seen for a increased heart rate.  Jeannette Grady LPN on 8/14/2019 at 2:58 PM  Chief Complaint   Patient presents with     Tachycardia       Initial /88 (BP Location: Right arm, Patient Position: Sitting, Cuff Size: Adult Regular)   Pulse 72   Temp 97.8  F (36.6  C) (Tympanic)   Resp 18   Wt 97.3 kg (214 lb 9.6 oz)   Breastfeeding? No   BMI 35.71 kg/m   Estimated body mass index is 35.71 kg/m  as calculated from the following:    Height as of 1/12/18: 1.651 m (5' 5\").    Weight as of this encounter: 97.3 kg (214 lb 9.6 oz).  Medication Reconciliation: complete    Jeannette Grady LPN    "

## 2019-08-19 ENCOUNTER — HOSPITAL ENCOUNTER (OUTPATIENT)
Dept: CARDIOLOGY | Facility: OTHER | Age: 54
Discharge: HOME | End: 2019-08-19
Attending: INTERNAL MEDICINE | Admitting: INTERNAL MEDICINE
Payer: COMMERCIAL

## 2019-08-19 DIAGNOSIS — R00.0 TACHYCARDIA: ICD-10-CM

## 2019-08-19 PROCEDURE — 93227 XTRNL ECG REC<48 HR R&I: CPT | Performed by: INTERNAL MEDICINE

## 2019-08-19 PROCEDURE — 93226 XTRNL ECG REC<48 HR SCAN A/R: CPT

## 2019-08-19 NOTE — PATIENT INSTRUCTIONS
Patient instructed to:   -wear device for 48 hours   -documents cardiac symptoms, activities and medication taken in the patient  diary   -not to take a bath, shower, swim or sauna   -not use electric blanket   -remove device at end of the test period and return to the diagnostics desk

## 2019-08-27 DIAGNOSIS — E11.9 TYPE 2 DIABETES MELLITUS WITHOUT COMPLICATION (H): ICD-10-CM

## 2019-08-29 NOTE — TELEPHONE ENCOUNTER
Refill Request for: Blood Glucose Test Strips   Received From: [facility]  Last Written Prescription Date: 8/1/18 for 100 strips and 5 refills  LOV: 8/14/19 with PCP  Next Appointment: No upcoming office visit on file during initial refill review with PCP  Protocol: Diabetic Supplies Protocol Passed - 8/29 1:15 PM    Prescription approved per [facility] Medication Refill Protocol.      Angelika HINSONN, RN on 8/29/2019 at 1:23 PM

## 2019-10-01 ENCOUNTER — HOSPITAL ENCOUNTER (OUTPATIENT)
Dept: MAMMOGRAPHY | Facility: OTHER | Age: 54
Discharge: HOME | End: 2019-10-01
Attending: INTERNAL MEDICINE | Admitting: INTERNAL MEDICINE
Payer: COMMERCIAL

## 2019-10-01 DIAGNOSIS — N63.20 LEFT BREAST MASS: ICD-10-CM

## 2019-10-01 DIAGNOSIS — Z09 FOLLOW-UP EXAM, 3-6 MONTHS SINCE PREVIOUS EXAM: ICD-10-CM

## 2019-10-01 PROCEDURE — G0279 TOMOSYNTHESIS, MAMMO: HCPCS

## 2019-10-14 ENCOUNTER — TRANSFERRED RECORDS (OUTPATIENT)
Dept: HEALTH INFORMATION MANAGEMENT | Facility: OTHER | Age: 54
End: 2019-10-14

## 2019-10-21 DIAGNOSIS — E78.5 HYPERLIPIDEMIA LDL GOAL <100: ICD-10-CM

## 2019-10-22 RX ORDER — ATORVASTATIN CALCIUM 20 MG/1
TABLET, FILM COATED ORAL
Qty: 90 TABLET | Refills: 1 | Status: SHIPPED | OUTPATIENT
Start: 2019-10-22 | End: 2020-05-05

## 2019-10-22 NOTE — TELEPHONE ENCOUNTER
"Requested Prescriptions   Pending Prescriptions Disp Refills     atorvastatin (LIPITOR) 20 MG tablet [Pharmacy Med Name: ATORVASTATIN 20MG   TAB] 90 tablet 3     Sig: TAKE 1 TABLET BY MOUTH ONCE DAILY       Statins Protocol Passed - 10/21/2019  8:12 AM        Passed - LDL on file in past 12 months     Recent Labs   Lab Test 05/20/19  0726   LDL 56             Passed - No abnormal creatine kinase in past 12 months     No lab results found.             Passed - Recent (12 mo) or future (30 days) visit within the authorizing provider's specialty     Patient has had an office visit with the authorizing provider or a provider within the authorizing providers department within the previous 12 mos or has a future within next 30 days. See \"Patient Info\" tab in inbasket, or \"Choose Columns\" in Meds & Orders section of the refill encounter.              Passed - Medication is active on med list        Passed - Patient is age 18 or older        Passed - No active pregnancy on record        Passed - No positive pregnancy test in past 12 months        LOV 8/14/19  Last lab per protocol 5/20/19  Prescription approved per [facility] Refill Protocol.  Brenda J. Goodell, RN on 10/22/2019 at 12:43 PM    "

## 2019-12-30 DIAGNOSIS — E11.9 TYPE 2 DIABETES MELLITUS WITHOUT COMPLICATION (H): ICD-10-CM

## 2019-12-30 RX ORDER — PEN NEEDLE, DIABETIC 31 GX5/16"
NEEDLE, DISPOSABLE MISCELLANEOUS
Qty: 100 EACH | Refills: 2 | Status: SHIPPED | OUTPATIENT
Start: 2019-12-30 | End: 2020-11-19

## 2019-12-30 NOTE — TELEPHONE ENCOUNTER
"Prescription approved per [facility] Refill Protocol.    Last refill 7/2/19 100 # 1 refill    Requested Prescriptions   Pending Prescriptions Disp Refills     B-D U/F 31G X 8 MM insulin pen needle [Pharmacy Med Name: B-D UF III SHORT PEN NEED MIS]  0     Sig: USE  ONCE DAILY WITH  VICTOZA       Diabetic Supplies Protocol Passed - 12/30/2019  4:57 PM        Passed - Medication is active on med list        Passed - Patient is 18 years of age or older        Passed - Recent (6 mo) or future (30 days) visit within the authorizing provider's specialty     Patient had office visit in the last 6 months or has a visit in the next 30 days with authorizing provider.  See \"Patient Info\" tab in inbasket, or \"Choose Columns\" in Meds & Orders section of the refill encounter.          LOV:8/14/19  No future visits planned  Ashia Davis RN on 12/30/2019 at 4:58 PM    "

## 2020-03-18 ENCOUNTER — HOSPITAL ENCOUNTER (OUTPATIENT)
Dept: MAMMOGRAPHY | Facility: OTHER | Age: 55
Discharge: HOME | End: 2020-03-18
Attending: INTERNAL MEDICINE | Admitting: INTERNAL MEDICINE
Payer: COMMERCIAL

## 2020-03-18 DIAGNOSIS — R92.8 ABNORMAL FINDING ON BREAST IMAGING: ICD-10-CM

## 2020-03-18 DIAGNOSIS — Z09 FOLLOW-UP EXAM, 3-6 MONTHS SINCE PREVIOUS EXAM: ICD-10-CM

## 2020-03-18 PROCEDURE — 77066 DX MAMMO INCL CAD BI: CPT

## 2020-04-04 DIAGNOSIS — E11.9 TYPE 2 DIABETES MELLITUS WITHOUT COMPLICATION, WITHOUT LONG-TERM CURRENT USE OF INSULIN (H): ICD-10-CM

## 2020-04-06 RX ORDER — LIRAGLUTIDE 6 MG/ML
INJECTION SUBCUTANEOUS
Qty: 9 ML | Refills: 3 | Status: SHIPPED | OUTPATIENT
Start: 2020-04-06 | End: 2020-08-07

## 2020-04-06 NOTE — TELEPHONE ENCOUNTER
Routing refill request to provider for review/approval because:   HgbA1C in past 3 or 6 months Protocol Details    Normal serum creatinine on file in past 12 months     Recent (6 mo) or future (30 days) visit within the authorizing provider's specialty      LOV: 8/14/19  Ruchi Clay RN on 4/6/2020 at 9:40 AM

## 2020-07-29 ENCOUNTER — OFFICE VISIT (OUTPATIENT)
Dept: INTERNAL MEDICINE | Facility: OTHER | Age: 55
End: 2020-07-29
Attending: INTERNAL MEDICINE
Payer: COMMERCIAL

## 2020-07-29 VITALS
BODY MASS INDEX: 35.85 KG/M2 | HEART RATE: 68 BPM | WEIGHT: 210 LBS | RESPIRATION RATE: 18 BRPM | TEMPERATURE: 97.6 F | SYSTOLIC BLOOD PRESSURE: 112 MMHG | HEIGHT: 64 IN | DIASTOLIC BLOOD PRESSURE: 80 MMHG

## 2020-07-29 DIAGNOSIS — E11.42 DIABETIC PERIPHERAL NEUROPATHY ASSOCIATED WITH TYPE 2 DIABETES MELLITUS (H): ICD-10-CM

## 2020-07-29 DIAGNOSIS — F34.1 DYSTHYMIC DISORDER: ICD-10-CM

## 2020-07-29 DIAGNOSIS — K21.9 GASTROESOPHAGEAL REFLUX DISEASE WITHOUT ESOPHAGITIS: ICD-10-CM

## 2020-07-29 DIAGNOSIS — E78.5 HYPERLIPIDEMIA LDL GOAL <100: ICD-10-CM

## 2020-07-29 DIAGNOSIS — Z12.11 SCREENING FOR COLON CANCER: ICD-10-CM

## 2020-07-29 DIAGNOSIS — I10 ESSENTIAL HYPERTENSION: ICD-10-CM

## 2020-07-29 DIAGNOSIS — E11.9 TYPE 2 DIABETES MELLITUS WITHOUT COMPLICATION, WITHOUT LONG-TERM CURRENT USE OF INSULIN (H): Primary | ICD-10-CM

## 2020-07-29 LAB
ALBUMIN SERPL-MCNC: 4.4 G/DL (ref 3.5–5.7)
ALP SERPL-CCNC: 74 U/L (ref 34–104)
ALT SERPL W P-5'-P-CCNC: 34 U/L (ref 7–52)
ANION GAP SERPL CALCULATED.3IONS-SCNC: 6 MMOL/L (ref 3–14)
AST SERPL W P-5'-P-CCNC: 17 U/L (ref 13–39)
BILIRUB SERPL-MCNC: 0.5 MG/DL (ref 0.3–1)
BUN SERPL-MCNC: 17 MG/DL (ref 7–25)
CALCIUM SERPL-MCNC: 9.3 MG/DL (ref 8.6–10.3)
CHLORIDE SERPL-SCNC: 105 MMOL/L (ref 98–107)
CHOLEST SERPL-MCNC: 118 MG/DL
CO2 SERPL-SCNC: 29 MMOL/L (ref 21–31)
CREAT SERPL-MCNC: 0.79 MG/DL (ref 0.6–1.2)
GFR SERPL CREATININE-BSD FRML MDRD: 76 ML/MIN/{1.73_M2}
GLUCOSE SERPL-MCNC: 141 MG/DL (ref 70–105)
HBA1C MFR BLD: 7.8 % (ref 4–6)
HDLC SERPL-MCNC: 36 MG/DL (ref 23–92)
LDLC SERPL CALC-MCNC: 60 MG/DL
NONHDLC SERPL-MCNC: 82 MG/DL
POTASSIUM SERPL-SCNC: 4.2 MMOL/L (ref 3.5–5.1)
PROT SERPL-MCNC: 7 G/DL (ref 6.4–8.9)
SODIUM SERPL-SCNC: 140 MMOL/L (ref 134–144)
TRIGL SERPL-MCNC: 110 MG/DL
TSH SERPL DL<=0.05 MIU/L-ACNC: 4.18 IU/ML (ref 0.34–5.6)

## 2020-07-29 PROCEDURE — 83036 HEMOGLOBIN GLYCOSYLATED A1C: CPT | Mod: ZL | Performed by: INTERNAL MEDICINE

## 2020-07-29 PROCEDURE — 80053 COMPREHEN METABOLIC PANEL: CPT | Mod: ZL | Performed by: INTERNAL MEDICINE

## 2020-07-29 PROCEDURE — 84443 ASSAY THYROID STIM HORMONE: CPT | Mod: ZL | Performed by: INTERNAL MEDICINE

## 2020-07-29 PROCEDURE — 36415 COLL VENOUS BLD VENIPUNCTURE: CPT | Mod: ZL | Performed by: INTERNAL MEDICINE

## 2020-07-29 PROCEDURE — 99214 OFFICE O/P EST MOD 30 MIN: CPT | Performed by: INTERNAL MEDICINE

## 2020-07-29 PROCEDURE — 80061 LIPID PANEL: CPT | Mod: ZL | Performed by: INTERNAL MEDICINE

## 2020-07-29 RX ORDER — CITALOPRAM HYDROBROMIDE 20 MG/1
20 TABLET ORAL DAILY
Qty: 90 TABLET | Refills: 3 | Status: SHIPPED | OUTPATIENT
Start: 2020-07-29 | End: 2021-08-24

## 2020-07-29 RX ORDER — GLIPIZIDE 5 MG/1
5 TABLET, FILM COATED, EXTENDED RELEASE ORAL DAILY
Qty: 90 TABLET | Refills: 3 | Status: SHIPPED | OUTPATIENT
Start: 2020-07-29 | End: 2021-05-04

## 2020-07-29 RX ORDER — LISINOPRIL 20 MG/1
20 TABLET ORAL DAILY
Qty: 90 TABLET | Refills: 3 | Status: SHIPPED | OUTPATIENT
Start: 2020-07-29 | End: 2021-08-24

## 2020-07-29 RX ORDER — ATORVASTATIN CALCIUM 20 MG/1
20 TABLET, FILM COATED ORAL DAILY
Qty: 90 TABLET | Refills: 3 | Status: SHIPPED | OUTPATIENT
Start: 2020-07-29 | End: 2021-08-24

## 2020-07-29 ASSESSMENT — ENCOUNTER SYMPTOMS
HEMATOLOGIC/LYMPHATIC NEGATIVE: 1
ENDOCRINE NEGATIVE: 1
ALLERGIC/IMMUNOLOGIC NEGATIVE: 1
CONSTITUTIONAL NEGATIVE: 1

## 2020-07-29 ASSESSMENT — PAIN SCALES - GENERAL: PAINLEVEL: NO PAIN (0)

## 2020-07-29 ASSESSMENT — PATIENT HEALTH QUESTIONNAIRE - PHQ9: SUM OF ALL RESPONSES TO PHQ QUESTIONS 1-9: 12

## 2020-07-29 ASSESSMENT — MIFFLIN-ST. JEOR: SCORE: 1542.55

## 2020-07-29 NOTE — LETTER
July 29, 2020      Jackie Donahue  [address] [address] [id number]        Dear Liudmila,    Below are the results of your recent labs:    Results for orders placed or performed in visit on 07/29/20   TSH     Status: None   Result Value Ref Range    Thyrotropin 4.18 0.34 - 5.60 IU/mL   Hemoglobin A1c     Status: Abnormal   Result Value Ref Range    Hemoglobin A1C 7.8 (H) 4.0 - 6.0 %   Lipid Profile     Status: None   Result Value Ref Range    Cholesterol 118 <200 mg/dL    Triglycerides 110 <150 mg/dL    HDL Cholesterol 36 23 - 92 mg/dL    LDL Cholesterol Calculated 60 <100 mg/dL    Non HDL Cholesterol 82 <130 mg/dL   Comprehensive metabolic panel     Status: Abnormal   Result Value Ref Range    Sodium 140 134 - 144 mmol/L    Potassium 4.2 3.5 - 5.1 mmol/L    Chloride 105 98 - 107 mmol/L    Carbon Dioxide 29 21 - 31 mmol/L    Anion Gap 6 3 - 14 mmol/L    Glucose 141 (H) 70 - 105 mg/dL    Urea Nitrogen 17 7 - 25 mg/dL    Creatinine 0.79 0.60 - 1.20 mg/dL    GFR Estimate 76 >60 mL/min/[1.73_m2]    GFR Estimate If Black >90 >60 mL/min/[1.73_m2]    Calcium 9.3 8.6 - 10.3 mg/dL    Bilirubin Total 0.5 0.3 - 1.0 mg/dL    Albumin 4.4 3.5 - 5.7 g/dL    Protein Total 7.0 6.4 - 8.9 g/dL    Alkaline Phosphatase 74 34 - 104 U/L    ALT 34 7 - 52 U/L    AST 17 13 - 39 U/L        As expected, your diabetes test has gone up slightly.  I am going to start you on glipizide 1 daily and I sent that prescription to your pharmacy.  Work hard on a healthy diet and exercise.  Return in 3 months for a recheck on your diabetes.  All of the rest of your blood tests look fine.    Sincerely,        Richard Sow MD  Internal Medicine  [facility]

## 2020-07-29 NOTE — NURSING NOTE
"Chief Complaint   Patient presents with     Diabetes       Initial /80 (BP Location: Right arm, Patient Position: Sitting, Cuff Size: Adult Large)   Pulse 68   Temp 97.6  F (36.4  C) (Tympanic)   Resp 18   Ht 1.626 m (5' 4\")   Wt 95.3 kg (210 lb)   BMI 36.05 kg/m   Estimated body mass index is 36.05 kg/m  as calculated from the following:    Height as of this encounter: 1.626 m (5' 4\").    Weight as of this encounter: 95.3 kg (210 lb).  Medication Reconciliation: complete    Perla Celis LPN  "

## 2020-07-29 NOTE — PROGRESS NOTES
Chief Complaint:  Check on issues.    HPI: She is in today for check on her diabetes, etc.  She has not been in for quite some time.  She feels as though her diabetes is probably quite high as when she is checking her blood sugars it is rarely under 150.  She attributes this to her sedentary lifestyle during the COVID pandemic as well as poor dietary habits.  She had been on glipizide in the past but was able to get off of it and just be on Victoza.  She is intolerant of metformin.    She does have some occasional numbness in her left upper lateral thigh and a little bit down towards her knee.  She had an EMG years ago that was normal, it was felt that she probably had meralgia paresthetica.  This only bothers her when she stands, not when she sits.  It is actually getting somewhat better.    Her brother recently  of some sort of pancreatitis.  Apparently there is a question of a genetic component to this.  She does not remember the details.  She will look into this and we will decide whether she needs any sort of neck testing or evaluation.    Medications are reconciled.  Past medical history, past surgical history, family history and social histories are reviewed and updated.    Past Medical History:   Diagnosis Date     Essential (primary) hypertension     2014     Gastro-esophageal reflux disease without esophagitis     No Comments Provided     Generalized anxiety disorder     No Comments Provided     Major depressive disorder, single episode     No Comments Provided     Type 2 diabetes mellitus without complications (H)     No Comments Provided       Past Surgical History:   Procedure Laterality Date     CHOLECYSTECTOMY      No Comments Provided     COLONOSCOPY  2010,Normal, neg. for occult colitis, due 2020     DILATION AND CURETTAGE      ,Also endometrial ablation     ESOPHAGOSCOPY, GASTROSCOPY, DUODENOSCOPY (EGD), COMBINED      ,Reactive gastropathy, mild to moderate refulux  changes.     HYSTERECTOMY TOTAL ABDOMINAL      ,LAVH/LSO     LAMINECT/DISCECTOMY, CERVICAL  2008     LAPAROSCOPIC TUBAL LIGATION      No Comments Provided     LAPAROSCOPY DIAGNOSTIC (GYN)      ,diagnostic laparoscopy With lysis of adhesions     RELEASE CARPAL TUNNEL Bilateral        Allergies   Allergen Reactions     Celecoxib Unknown     Metformin Diarrhea       Current Outpatient Medications   Medication Sig Dispense Refill     atorvastatin (LIPITOR) 20 MG tablet Take 1 tablet (20 mg) by mouth daily 90 tablet 3     B-D U/F 31G X 8 MM insulin pen needle USE  ONCE DAILY WITH  VICTOZA 100 each 2     cholecalciferol (VITAMIN D3) 1000 UNIT tablet Take 1 tablet (1,000 Units) by mouth daily 100 tablet 3     citalopram (CELEXA) 20 MG tablet Take 1 tablet (20 mg) by mouth daily 90 tablet 3     CONTOUR NEXT TEST test strip USE  TO CHECK GLUCOSE TWICE DAILY 200 each 1     ketoconazole (NIZORAL) 2 % cream APPLY A THIN FILM OF CREAM TOPICALLY TWICE DAILY. 15 g 3     lisinopril (ZESTRIL) 20 MG tablet Take 1 tablet (20 mg) by mouth daily 90 tablet 3     MICROLET LANCETS MISC USE TO TEST BLOOD SUGAR 2 TIMES DAILY. 200 each 2     omeprazole (PRILOSEC) 20 MG CR capsule TAKE ONE CAPSULE BY MOUTH ONCE DAILY BEFORE A MEAL       VICTOZA PEN 18 MG/3ML soln INJECT 1.8 MG SUBCUTANEOUS DAILY 9 mL 3       Social History     Socioeconomic History     Marital status:      Spouse name: Not on file     Number of children: Not on file     Years of education: Not on file     Highest education level: Not on file   Occupational History     Occupation: TELLER     Employer: OTHER   Social Needs     Financial resource strain: Not on file     Food insecurity     Worry: Not on file     Inability: Not on file     Transportation needs     Medical: Not on file     Non-medical: Not on file   Tobacco Use     Smoking status: Former Smoker     Types: Cigarettes     Last attempt to quit: 2002     Years since quittin.4     Smokeless  tobacco: Never Used   Substance and Sexual Activity     Alcohol use: No     Alcohol/week: 0.0 standard drinks     Drug use: No     Comment: Drug use: No     Sexual activity: Yes     Partners: Male   Lifestyle     Physical activity     Days per week: Not on file     Minutes per session: Not on file     Stress: Not on file   Relationships     Social connections     Talks on phone: Not on file     Gets together: Not on file     Attends Sikh service: Not on file     Active member of club or organization: Not on file     Attends meetings of clubs or organizations: Not on file     Relationship status: Not on file     Intimate partner violence     Fear of current or ex partner: Not on file     Emotionally abused: Not on file     Physically abused: Not on file     Forced sexual activity: Not on file   Other Topics Concern     Parent/sibling w/ CABG, MI or angioplasty before 65F 55M? Not Asked   Social History Narrative    , two children, working at [employer]. Lives in [place].   employed at [employer].       Review of Systems   Constitutional: Negative.    Endocrine: Negative.    Skin: Negative.    Allergic/Immunologic: Negative.    Hematological: Negative.        Physical Exam  Vitals signs and nursing note reviewed.   Constitutional:       General: She is not in acute distress.     Appearance: Normal appearance. She is not ill-appearing, toxic-appearing or diaphoretic.   Neck:      Musculoskeletal: Neck supple. No neck rigidity.   Cardiovascular:      Rate and Rhythm: Normal rate and regular rhythm.      Heart sounds: Normal heart sounds.   Pulmonary:      Effort: Pulmonary effort is normal.      Breath sounds: Normal breath sounds.   Abdominal:      General: Abdomen is flat. Bowel sounds are normal. There is no distension.      Palpations: Abdomen is soft.      Tenderness: There is no abdominal tenderness.   Musculoskeletal:      Right lower leg: No edema.      Left lower leg: No edema.    Lymphadenopathy:      Cervical: No cervical adenopathy.   Skin:     General: Skin is warm and dry.   Neurological:      General: No focal deficit present.      Mental Status: She is alert. Mental status is at baseline.         Assessment:      ICD-10-CM    1. Type 2 diabetes mellitus without complication, without long-term current use of insulin (H)  E11.9 Comprehensive metabolic panel     Lipid Profile     Hemoglobin A1c     TSH   2. Screening for colon cancer  Z12.11 GASTROENTEROLOGY ADULT REF PROCEDURE ONLY   3. Hyperlipidemia LDL goal <100  E78.5 atorvastatin (LIPITOR) 20 MG tablet   4. Essential hypertension  I10 lisinopril (ZESTRIL) 20 MG tablet   5. Diabetic peripheral neuropathy associated with type 2 diabetes mellitus (H)  E11.42    6. Gastroesophageal reflux disease without esophagitis  K21.9    7. Dysthymic disorder  F34.1 citalopram (CELEXA) 20 MG tablet       Plan: She is due for a colonoscopy, this has been ordered.  She is due for lab work, this is pending and I will send her a letter with the results.  If it looks as though her diabetes is in poor control we will likely get her started back on glipizide daily.  Other medications will continue without change.  She will let me know the pathology and diagnosis of her brother and we will proceed as indicated.  I will likely have to have her back for recheck on diabetes in 3 months unless her A1c is surprisingly low.

## 2020-08-04 DIAGNOSIS — Z00.00 HEALTHCARE MAINTENANCE: Primary | ICD-10-CM

## 2020-08-04 RX ORDER — BISACODYL 5 MG
TABLET, DELAYED RELEASE (ENTERIC COATED) ORAL
Qty: 2 TABLET | Refills: 0 | Status: SHIPPED | OUTPATIENT
Start: 2020-08-04 | End: 2021-01-22

## 2020-08-04 RX ORDER — POLYETHYLENE GLYCOL 3350, SODIUM CHLORIDE, SODIUM BICARBONATE, POTASSIUM CHLORIDE 420; 11.2; 5.72; 1.48 G/4L; G/4L; G/4L; G/4L
4000 POWDER, FOR SOLUTION ORAL ONCE
Qty: 4000 ML | Refills: 0 | Status: SHIPPED | OUTPATIENT
Start: 2020-08-04 | End: 2020-08-04

## 2020-08-04 NOTE — TELEPHONE ENCOUNTER
Screening Questions for the Scheduling of Screening Colonoscopies   (If Colonoscopy is diagnostic, Provider should review the chart before scheduling.)  Are you younger than 50 or older than 80?  NO   Do you take aspirin or fish oil?  NO  (if yes, tell patient to stop 1 week prior to Colonoscopy)  Do you take warfarin (Coumadin), clopidogrel (Plavix), apixaban (Eliquis), dabigatram (Pradaxa), rivaroxaban (Xarelto) or any blood thinner? NO   Do you use oxygen at home?  NO   Do you have kidney disease? NO   Are you on dialysis? NO   Have you had a stroke or heart attack in the last year? NO   Have you had a stent in your heart or any blood vessel in the last year? NO   Have you had a transplant of any organ? NO   Have you had a colonoscopy or upper endoscopy (EGD) before? YES          When?  10 YRS AGO   Date of scheduled Colonoscopy. 09/21/2020  Provider TYLER   Pharmacy [facility]

## 2020-08-07 DIAGNOSIS — E11.9 TYPE 2 DIABETES MELLITUS WITHOUT COMPLICATION, WITHOUT LONG-TERM CURRENT USE OF INSULIN (H): ICD-10-CM

## 2020-08-07 RX ORDER — LIRAGLUTIDE 6 MG/ML
INJECTION SUBCUTANEOUS
Qty: 9 ML | Refills: 0 | Status: SHIPPED | OUTPATIENT
Start: 2020-08-07 | End: 2020-09-09

## 2020-08-07 NOTE — TELEPHONE ENCOUNTER
[id number]  sent Rx request for the following:        Last Office Visit:             7/29/2020  Future Office visit:           9/18/2020     Disp  Refills  Start  End  JAE    VICTOZA PEN 18 MG/3ML soln  9 mL  3  4/6/2020   No    Sig: INJECT 1.8 MG SUBCUTANEOUS DAILY    Sent to pharmacy as: VICTOZA PEN 18 MG/3ML soln      Prescription approved per [facility] Refill Protocol.  Fara Carney RN .............. 8/7/2020  3:18 PM

## 2020-09-07 DIAGNOSIS — E11.9 TYPE 2 DIABETES MELLITUS WITHOUT COMPLICATION, WITHOUT LONG-TERM CURRENT USE OF INSULIN (H): ICD-10-CM

## 2020-09-09 RX ORDER — LIRAGLUTIDE 6 MG/ML
INJECTION SUBCUTANEOUS
Qty: 9 ML | Refills: 0 | Status: SHIPPED | OUTPATIENT
Start: 2020-09-09 | End: 2020-10-07

## 2020-09-09 NOTE — TELEPHONE ENCOUNTER
"[facility] sent Rx request for the following:   VICTOZA PEN 18 MG/3ML soln  Sig: INJECT 1.8 MG SUBCUTANEOUS DAILY    Last Prescription Date:   08/07/2020  Last Fill Qty/Refills:         9mL, R-0    Last Office Visit:              07/29/2020   Future Office visit:           None noted   GLP-1 Agonists Protocol Passed -         Passed - HgbA1C in past 3 or 6 months     If HgbA1C is 8 or greater, it needs to be on file within the past 3 months.  If less than 8, must be on file within the past 6 months.     Recent Labs   Lab Test 07/29/20  1101  01/18/18  0918   A1C 7.8*   < >  --    [id number]  --   --  5.9    < > = values in this interval not displayed.             Passed - Medication is active on med list        Passed - Patient is age 18 or older        Passed - No active pregnancy on record        Passed - Normal serum creatinine on file in past 12 months     Recent Labs   Lab Test 07/29/20  1101   CR 0.79       Ok to refill medication if creatinine is low          Passed - No positive pregnancy test in past 12 months        Passed - Recent (6 mo) or future (30 days) visit within the authorizing provider's specialty     Patient had office visit in the last 6 months or has a visit in the next 30 days with authorizing provider.  See \"Patient Info\" tab in inbasket, or \"Choose Columns\" in Meds & Orders section of the refill encounter.               Prescription approved per [facility] Refill Protocol.  Rozina Ventura RN ....................  9/9/2020   1:34 PM      "

## 2020-09-09 NOTE — TELEPHONE ENCOUNTER
[facility] sent Rx request for the following:      Victoza 18 MG/3ML Subcutaneous Solution Pen-injector  Sig: INJECT 1.8 MG SUBCUTANEOUS DAILY      Last Prescription Date:   8/7/2020  Last Fill Qty/Refills:         9ml, R-0    Last Office Visit:              7/29/2020   Future Office visit:           10/9/2020    Prescription refilled per RN Medication Refill Policy.................... Hakan Mcallister RN ....................  9/9/2020   1:28 PM

## 2020-09-17 DIAGNOSIS — E11.9 TYPE 2 DIABETES MELLITUS WITHOUT COMPLICATION (H): ICD-10-CM

## 2020-09-17 NOTE — TELEPHONE ENCOUNTER
Prescription refilled per RN Medication Refill Policy..................Rose Almanza RN 9/17/2020 3:24 PM

## 2020-10-06 RX ORDER — POLYETHYLENE GLYCOL 3350, SODIUM CHLORIDE, SODIUM BICARBONATE, POTASSIUM CHLORIDE 420; 11.2; 5.72; 1.48 G/4L; G/4L; G/4L; G/4L
POWDER, FOR SOLUTION ORAL
COMMUNITY
Start: 2020-08-04 | End: 2021-01-22

## 2020-10-07 DIAGNOSIS — E11.9 TYPE 2 DIABETES MELLITUS WITHOUT COMPLICATION, WITHOUT LONG-TERM CURRENT USE OF INSULIN (H): ICD-10-CM

## 2020-10-07 RX ORDER — LIRAGLUTIDE 6 MG/ML
INJECTION SUBCUTANEOUS
Qty: 9 ML | Refills: 2 | Status: SHIPPED | OUTPATIENT
Start: 2020-10-07 | End: 2021-01-11

## 2020-10-07 NOTE — TELEPHONE ENCOUNTER
[facility] sent Rx request for the following:   VICTOZA PEN 18 MG/3ML soln  Sig: INJECT 1.8 MG SUBCUTANEOUS DAILY    Last Prescription Date:   09/09/2020  Last Fill Qty/Refills:         9ml, R-0    Last Office Visit:              07/29/2020   Future Office visit:           none  GLP-1 Agonists Protocol Passed    Prescription refilled per RN Medication Refill Policy.................... Lisa Llanes RN ....................  10/7/2020   12:28 PM

## 2020-10-09 ENCOUNTER — ALLIED HEALTH/NURSE VISIT (OUTPATIENT)
Dept: FAMILY MEDICINE | Facility: OTHER | Age: 55
End: 2020-10-09
Attending: SURGERY
Payer: COMMERCIAL

## 2020-10-09 DIAGNOSIS — Z00.00 HEALTHCARE MAINTENANCE: ICD-10-CM

## 2020-10-09 PROCEDURE — 99207 PR NO CHARGE NURSE ONLY: CPT

## 2020-10-09 PROCEDURE — C9803 HOPD COVID-19 SPEC COLLECT: HCPCS | Performed by: SURGERY

## 2020-10-09 PROCEDURE — U0003 INFECTIOUS AGENT DETECTION BY NUCLEIC ACID (DNA OR RNA); SEVERE ACUTE RESPIRATORY SYNDROME CORONAVIRUS 2 (SARS-COV-2) (CORONAVIRUS DISEASE [COVID-19]), AMPLIFIED PROBE TECHNIQUE, MAKING USE OF HIGH THROUGHPUT TECHNOLOGIES AS DESCRIBED BY CMS-2020-01-R: HCPCS | Mod: ZL | Performed by: SURGERY

## 2020-10-10 LAB
SARS-COV-2 RNA SPEC QL NAA+PROBE: NOT DETECTED
SPECIMEN SOURCE: NORMAL

## 2020-10-12 ENCOUNTER — ANESTHESIA (OUTPATIENT)
Dept: SURGERY | Facility: OTHER | Age: 55
End: 2020-10-12
Payer: COMMERCIAL

## 2020-10-12 ENCOUNTER — ANESTHESIA EVENT (OUTPATIENT)
Dept: SURGERY | Facility: OTHER | Age: 55
End: 2020-10-12
Payer: COMMERCIAL

## 2020-10-12 ENCOUNTER — HOSPITAL ENCOUNTER (OUTPATIENT)
Facility: OTHER | Age: 55
Discharge: HOME | End: 2020-10-12
Attending: SURGERY | Admitting: SURGERY
Payer: COMMERCIAL

## 2020-10-12 VITALS
WEIGHT: 220 LBS | RESPIRATION RATE: 16 BRPM | SYSTOLIC BLOOD PRESSURE: 145 MMHG | BODY MASS INDEX: 37.56 KG/M2 | HEIGHT: 64 IN | HEART RATE: 72 BPM | OXYGEN SATURATION: 96 % | DIASTOLIC BLOOD PRESSURE: 97 MMHG | TEMPERATURE: 97.1 F

## 2020-10-12 PROBLEM — K63.5 COLON POLYPS: Status: ACTIVE | Noted: 2020-10-12

## 2020-10-12 PROCEDURE — 45384 COLONOSCOPY W/LESION REMOVAL: CPT | Mod: PT | Performed by: SURGERY

## 2020-10-12 PROCEDURE — 258N000003 HC RX IP 258 OP 636: Performed by: SURGERY

## 2020-10-12 PROCEDURE — 45384 COLONOSCOPY W/LESION REMOVAL: CPT | Performed by: NURSE ANESTHETIST, CERTIFIED REGISTERED

## 2020-10-12 PROCEDURE — 88305 TISSUE EXAM BY PATHOLOGIST: CPT

## 2020-10-12 PROCEDURE — 250N000009 HC RX 250: Performed by: NURSE ANESTHETIST, CERTIFIED REGISTERED

## 2020-10-12 PROCEDURE — 999N000010 HC STATISTIC ANES STAT CODE-CRNA PER MINUTE: Performed by: SURGERY

## 2020-10-12 PROCEDURE — 258N000003 HC RX IP 258 OP 636: Performed by: NURSE ANESTHETIST, CERTIFIED REGISTERED

## 2020-10-12 PROCEDURE — 45384 COLONOSCOPY W/LESION REMOVAL: CPT | Performed by: SURGERY

## 2020-10-12 RX ORDER — SODIUM CHLORIDE, SODIUM LACTATE, POTASSIUM CHLORIDE, CALCIUM CHLORIDE 600; 310; 30; 20 MG/100ML; MG/100ML; MG/100ML; MG/100ML
INJECTION, SOLUTION INTRAVENOUS CONTINUOUS
Status: DISCONTINUED | OUTPATIENT
Start: 2020-10-12 | End: 2020-10-12 | Stop reason: HOSPADM

## 2020-10-12 RX ORDER — LIDOCAINE 40 MG/G
CREAM TOPICAL
Status: DISCONTINUED | OUTPATIENT
Start: 2020-10-12 | End: 2020-10-12 | Stop reason: HOSPADM

## 2020-10-12 RX ORDER — SODIUM CHLORIDE, SODIUM LACTATE, POTASSIUM CHLORIDE, CALCIUM CHLORIDE 600; 310; 30; 20 MG/100ML; MG/100ML; MG/100ML; MG/100ML
INJECTION, SOLUTION INTRAVENOUS CONTINUOUS PRN
Status: DISCONTINUED | OUTPATIENT
Start: 2020-10-12 | End: 2020-10-12

## 2020-10-12 RX ORDER — NALOXONE HYDROCHLORIDE 0.4 MG/ML
.1-.4 INJECTION, SOLUTION INTRAMUSCULAR; INTRAVENOUS; SUBCUTANEOUS
Status: DISCONTINUED | OUTPATIENT
Start: 2020-10-12 | End: 2020-10-12 | Stop reason: HOSPADM

## 2020-10-12 RX ORDER — FLUMAZENIL 0.1 MG/ML
0.2 INJECTION, SOLUTION INTRAVENOUS
Status: DISCONTINUED | OUTPATIENT
Start: 2020-10-12 | End: 2020-10-12 | Stop reason: HOSPADM

## 2020-10-12 RX ORDER — PROPOFOL 10 MG/ML
INJECTION, EMULSION INTRAVENOUS CONTINUOUS PRN
Status: DISCONTINUED | OUTPATIENT
Start: 2020-10-12 | End: 2020-10-12

## 2020-10-12 RX ORDER — ONDANSETRON 2 MG/ML
4 INJECTION INTRAMUSCULAR; INTRAVENOUS
Status: DISCONTINUED | OUTPATIENT
Start: 2020-10-12 | End: 2020-10-12 | Stop reason: HOSPADM

## 2020-10-12 RX ADMIN — PROPOFOL 160 MCG/KG/MIN: 10 INJECTION, EMULSION INTRAVENOUS at 09:45

## 2020-10-12 RX ADMIN — SODIUM CHLORIDE, POTASSIUM CHLORIDE, SODIUM LACTATE AND CALCIUM CHLORIDE: 600; 310; 30; 20 INJECTION, SOLUTION INTRAVENOUS at 09:42

## 2020-10-12 RX ADMIN — SODIUM CHLORIDE, POTASSIUM CHLORIDE, SODIUM LACTATE AND CALCIUM CHLORIDE: 600; 310; 30; 20 INJECTION, SOLUTION INTRAVENOUS at 09:32

## 2020-10-12 ASSESSMENT — MIFFLIN-ST. JEOR: SCORE: 1582.91

## 2020-10-12 NOTE — ANESTHESIA CARE TRANSFER NOTE
Patient: Jackie Donahue    Procedure(s):  COLONOSCOPY, WITH POLYPECTOMY USING HOT BIOPSY DEVICE    Diagnosis: Screening for colon cancer [Z12.11]  Diagnosis Additional Information: No value filed.    Anesthesia Type:   MAC     Note:  Airway :Face Mask (Patient transported on O2 @ 10L/min)  Patient transferred to:Phase II  Handoff Report: Identifed the Patient, Identified the Reponsible Provider, Reviewed the pertinent medical history, Discussed the surgical course, Reviewed Intra-OP anesthesia mangement and issues during anesthesia, Set expectations for post-procedure period and Allowed opportunity for questions and acknowledgement of understanding      Vitals: (Last set prior to Anesthesia Care Transfer)    CRNA VITALS  10/12/2020 0951 - 10/12/2020 1026      10/12/2020             Resp Rate (set):  10                Electronically Signed By: David Kellerman, APRN CRNA  October 12, 2020  10:26 AM

## 2020-10-12 NOTE — ANESTHESIA POSTPROCEDURE EVALUATION
Patient: Jackie Donahue    Procedure(s):  COLONOSCOPY, WITH POLYPECTOMY USING HOT BIOPSY DEVICE    Diagnosis:Screening for colon cancer [Z12.11]  Diagnosis Additional Information: No value filed.    Anesthesia Type:  MAC    Note:  Anesthesia Post Evaluation    Patient location during evaluation: Phase 2  Patient participation: Able to fully participate in evaluation  Level of consciousness: awake and alert  Pain management: adequate  Airway patency: patent  Cardiovascular status: acceptable  Respiratory status: acceptable  Hydration status: acceptable  PONV: none     Anesthetic complications: None          Last vitals:  Vitals:    10/12/20 1030 10/12/20 1035 10/12/20 1045   BP: 118/78  (!) 145/97   Pulse: 77  72   Resp:      Temp:      SpO2: 96% 96% 96%         Electronically Signed By: LUZ MARIA RED CRNA  October 12, 2020  12:04 PM

## 2020-10-12 NOTE — DISCHARGE INSTRUCTIONS
[facility]  Adult Discharge Orders & Instructions         For 12 hours after surgery  1. Get plenty of rest.  A responsible adult must stay with you for at least 12 hours after you leave the hospital.   2. You may feel lightheaded.  IF so, sit for a few minutes before standing.  Have someone help you get up.   3. You may have a slight fever. Call the doctor if your fever is over 101 F (38.3 C) (taken under the tongue) or lasts longer than 24 hours.  4. You may have a dry mouth, a sore throat, muscle aches or trouble sleeping.  These should go away after 24 hours.  5. Do not make important or legal decisions.  6.   Do not drive or use heavy equipment.  If you have weakness or tingling, don't drive or use heavy equipment until this feeling goes away.    To contact a doctor, call   [phone]

## 2020-10-12 NOTE — H&P
History and Physical    CHIEF COMPLAINT / REASON FOR PROCEDURE:  Healthcare maintenance     PERTINENT HISTORY   Patient is a 55 year old female who presents today for colonoscopy for Healthcare maintenance.   Last colonoscopy 2010.  Patient has no complaints.    Past Medical History:   Diagnosis Date     Essential (primary) hypertension     11/6/2014     Gastro-esophageal reflux disease without esophagitis     No Comments Provided     Generalized anxiety disorder     No Comments Provided     Major depressive disorder, single episode     No Comments Provided     Type 2 diabetes mellitus without complications (H)     No Comments Provided     Past Surgical History:   Procedure Laterality Date     CHOLECYSTECTOMY      No Comments Provided     COLONOSCOPY  01/01/2010 2010,Normal, neg. for occult colitis, due 2020     DILATION AND CURETTAGE      2004,Also endometrial ablation     ESOPHAGOSCOPY, GASTROSCOPY, DUODENOSCOPY (EGD), COMBINED      2011,Reactive gastropathy, mild to moderate refulux changes.     HYSTERECTOMY TOTAL ABDOMINAL      2006,LAVH/LSO     LAMINECT/DISCECTOMY, CERVICAL  2008     LAPAROSCOPIC TUBAL LIGATION      No Comments Provided     LAPAROSCOPY DIAGNOSTIC (GYN)      2007,diagnostic laparoscopy With lysis of adhesions     RELEASE CARPAL TUNNEL Bilateral        Bleeding tendencies:  No    ALLERGIES/SENSITIVITIES:   Allergies   Allergen Reactions     Celecoxib Unknown     Metformin Diarrhea        CURRENT MEDICATIONS:    Prior to Admission medications    Medication Sig Start Date End Date Taking? Authorizing Provider   atorvastatin (LIPITOR) 20 MG tablet Take 1 tablet (20 mg) by mouth daily 7/29/20  Yes Richard Sow MD   bisacodyl (DULCOLAX) 5 MG EC tablet Use as directed per colonoscopy prep. 8/4/20  Yes Flo Walker MD   cholecalciferol (VITAMIN D3) 1000 UNIT tablet Take 1 tablet (1,000 Units) by mouth daily 3/19/18  Yes Richard Sow MD   citalopram (CELEXA) 20 MG tablet Take 1 tablet (20 mg)  "by mouth daily 7/29/20  Yes Richard Sow MD   glipiZIDE (GLUCOTROL XL) 5 MG 24 hr tablet Take 1 tablet (5 mg) by mouth daily 7/29/20  Yes Richard Sow MD   lisinopril (ZESTRIL) 20 MG tablet Take 1 tablet (20 mg) by mouth daily 7/29/20  Yes Richard Sow MD   omeprazole (PRILOSEC) 20 MG CR capsule TAKE ONE CAPSULE BY MOUTH ONCE DAILY BEFORE A MEAL 10/4/17  Yes Reported, Patient   polyethylene glycol-electrolytes (NULYTELY) 420 g solution TAKE 4 000MLS BY MOUTH AS A ONE TIME DOSE 8/4/20  Yes Reported, Patient   VICTOZA PEN 18 MG/3ML soln INJECT 1.8 MG SUBCUTANEOUS DAILY 10/7/20  Yes Richard Sow MD   B-D U/F 31G X 8 MM insulin pen needle USE  ONCE DAILY WITH  VICTOZA 12/30/19   Richard Sow MD   CONTOUR NEXT TEST test strip USE  TO CHECK GLUCOSE TWICE DAILY 9/17/20   Richard Sow MD   ketoconazole (NIZORAL) 2 % cream APPLY A THIN FILM OF CREAM TOPICALLY TWICE DAILY. 10/26/18   Richard Sow MD   MICROLET LANCETS MISC USE TO TEST BLOOD SUGAR 2 TIMES DAILY. 8/7/19   Richard Sow MD       Physical Exam:  /78   Pulse 63   Temp 97.3  F (36.3  C) (Tympanic)   Resp 16   Ht 1.626 m (5' 4\")   Wt 99.8 kg (220 lb)   SpO2 96%   BMI 37.76 kg/m    EXAM:  Chest/Respiratory Exam: Normal - Clear to auscultation without rales, rhonchi, or wheezing.  Cardiovascular Exam: normal, regular rate and rhythm        PLAN: COLONOSCOPY .  Patient understands risks of bleeding, perforation, potential inability to reach cecum, aspiration and wishes to proceed.    "

## 2020-10-12 NOTE — ANESTHESIA PREPROCEDURE EVALUATION
Anesthesia Pre-Procedure Evaluation    Patient: Jackie Donahue   MRN: [id number] : 1965          Preoperative Diagnosis: Screening for colon cancer [Z12.11]    Procedure(s):  COLONOSCOPY    Past Medical History:   Diagnosis Date     Essential (primary) hypertension     2014     Gastro-esophageal reflux disease without esophagitis     No Comments Provided     Generalized anxiety disorder     No Comments Provided     Major depressive disorder, single episode     No Comments Provided     Type 2 diabetes mellitus without complications (H)     No Comments Provided     Past Surgical History:   Procedure Laterality Date     CHOLECYSTECTOMY      No Comments Provided     COLONOSCOPY  2010,Normal, neg. for occult colitis, due 2020     DILATION AND CURETTAGE      ,Also endometrial ablation     ESOPHAGOSCOPY, GASTROSCOPY, DUODENOSCOPY (EGD), COMBINED      ,Reactive gastropathy, mild to moderate refulux changes.     HYSTERECTOMY TOTAL ABDOMINAL      ,LAVH/LSO     LAMINECT/DISCECTOMY, CERVICAL       LAPAROSCOPIC TUBAL LIGATION      No Comments Provided     LAPAROSCOPY DIAGNOSTIC (GYN)      ,diagnostic laparoscopy With lysis of adhesions     RELEASE CARPAL TUNNEL Bilateral        Anesthesia Evaluation     . Pt has had prior anesthetic.     No history of anesthetic complications          ROS/MED HX    ENT/Pulmonary:  - neg pulmonary ROS   (+)MARTA risk factors hypertension, obese, , . .    Neurologic:  - neg neurologic ROS     Cardiovascular:     (+) Dyslipidemia, hypertension----. : . . . :. .       METS/Exercise Tolerance:  >4 METS   Hematologic:  - neg hematologic  ROS       Musculoskeletal:  - neg musculoskeletal ROS       GI/Hepatic:     (+) GERD Asymptomatic on medication,       Renal/Genitourinary:  - ROS Renal section negative       Endo:     (+) type II DM Obesity, .      Psychiatric:  - neg psychiatric ROS       Infectious Disease:  - neg infectious disease ROS      "  Malignancy:      - no malignancy   Other:    - neg other ROS                      Physical Exam  Normal systems: cardiovascular, pulmonary and dental    Airway   Mallampati: III  TM distance: >3 FB  Neck ROM: full    Dental     Cardiovascular   Rhythm and rate: regular and normal      Pulmonary    breath sounds clear to auscultation            Lab Results   Component Value Date    HGB 11.2 (L) 01/12/2018    HCT 33.0 01/12/2018     01/12/2018    SED 9 01/12/2018     07/29/2020    POTASSIUM 4.2 07/29/2020    CHLORIDE 105 07/29/2020    CO2 29 07/29/2020    BUN 17 07/29/2020    CR 0.79 07/29/2020     (H) 07/29/2020    FERNY 9.3 07/29/2020    ALBUMIN 4.4 07/29/2020    PROTTOTAL 7.0 07/29/2020    ALT 34 07/29/2020    AST 17 07/29/2020    ALKPHOS 74 07/29/2020    BILITOTAL 0.5 07/29/2020    PTT 33 01/15/2016    INR 1.1 01/15/2016    T4 0.80 10/20/2014       Preop Vitals  BP Readings from Last 3 Encounters:   07/29/20 112/80   08/14/19 126/88   06/04/19 110/78    Pulse Readings from Last 3 Encounters:   07/29/20 68   08/14/19 72   06/04/19 76      Resp Readings from Last 3 Encounters:   07/29/20 18   08/14/19 18   06/04/19 18    SpO2 Readings from Last 3 Encounters:   No data found for SpO2      Temp Readings from Last 1 Encounters:   07/29/20 97.6  F (36.4  C) (Tympanic)    Ht Readings from Last 1 Encounters:   07/29/20 1.626 m (5' 4\")      Wt Readings from Last 1 Encounters:   07/29/20 95.3 kg (210 lb)    Estimated body mass index is 36.05 kg/m  as calculated from the following:    Height as of 7/29/20: 1.626 m (5' 4\").    Weight as of 7/29/20: 95.3 kg (210 lb).       Anesthesia Plan      History & Physical Review      ASA Status:  2 .    NPO Status:  > 6 hours    Plan for MAC with Propofol induction.            Postoperative Care      Consents  Anesthetic plan, risks, benefits and alternatives discussed with:  Patient..                 LUZ MARIA RED CRNA  "

## 2020-10-12 NOTE — OP NOTE
PROCEDURE NOTE    DATE OF SERVICE: 10/12/2020    SURGEON: Flo Walker MD    PRE-OP DIAGNOSIS:    Healthcare maintenance     POST-OP DIAGNOSIS:  Same  Polyps at cecum and rectum    PROCEDURE:   Colonoscopy with hot biopsy      ANESTHESIA:  MAC D Kellerman CRNA    INDICATION FOR THE PROCEDURE: The patient is a 55 year old female in need of Healthcare maintenance  . The patient has no other complaints  . After explaining the risks to include bleeding, perforation, potential inability toreach the cecum, the patient wished to proceed.    PROCEDURE:After adequate sedation, the patient was in the left lateral decubitus position.  Rectal exam was performed.  There was normal tone and no palpable masses .  The colonoscope was introduced into the rectum and advanced to the cecum with Moderate difficulty to reach cecum.  The patient's prep was excellent.  The terminal cecum was reached.  The cecum, ascending, transverse, descending and sigmoid colon was with a polyp under 0.5 cm at cecum that was hot biopsied and destroyed. .  The scope was retroflexed in the rectum.  The rectum was remarkable for a flat polyp under 0.5 cm that was hot biopsied and destroyed .  The scope was straightened and removed.  The patient tolerated the procedure well.     ESTIMATED BLOOD LOSS: none    COMPLICATIONS:  None    TISSUE REMOVED:  Yes    RECOMMEND:      Follow-up pending pathology      Flo Walker MD FACS

## 2020-10-14 PROBLEM — Z86.0101 H/O ADENOMATOUS POLYP OF COLON: Status: ACTIVE | Noted: 2020-10-12

## 2020-11-16 DIAGNOSIS — E11.9 TYPE 2 DIABETES MELLITUS WITHOUT COMPLICATION, WITHOUT LONG-TERM CURRENT USE OF INSULIN (H): ICD-10-CM

## 2020-11-16 RX ORDER — LANCETS
EACH MISCELLANEOUS
Qty: 200 EACH | Refills: 0 | Status: SHIPPED | OUTPATIENT
Start: 2020-11-16 | End: 2021-03-01

## 2020-11-16 NOTE — TELEPHONE ENCOUNTER
"Requested Prescriptions   Pending Prescriptions Disp Refills     Microlet Lancets MISC [Pharmacy Med Name: MICROLET LANCETS MIS]  0     Sig: USE   TO CHECK GLUCOSE TWICE DAILY       Diabetic Supplies Protocol Passed - 11/16/2020  7:32 AM        Passed - Medication is active on med list        Passed - Patient is 18 years of age or older        Passed - Recent (6 mo) or future (30 days) visit within the authorizing provider's specialty     Patient had office visit in the last 6 months or has a visit in the next 30 days with authorizing provider.  See \"Patient Info\" tab in inbasket, or \"Choose Columns\" in Meds & Orders section of the refill encounter.             Prescription approved per [facility] Refill Protocol.  LOV 07/29/2020 with Richard Sow MD.  Last A1c 07/29/2020 7.8%.  Odalis Hdz RN on 11/16/2020 at 2:28 PM            "

## 2020-11-19 DIAGNOSIS — E11.9 TYPE 2 DIABETES MELLITUS WITHOUT COMPLICATION (H): ICD-10-CM

## 2020-11-19 RX ORDER — PEN NEEDLE, DIABETIC 31 GX5/16"
NEEDLE, DISPOSABLE MISCELLANEOUS
Qty: 100 EACH | Refills: 0 | Status: SHIPPED | OUTPATIENT
Start: 2020-11-19 | End: 2021-03-01

## 2020-11-19 NOTE — TELEPHONE ENCOUNTER
[facility] sent Rx request for the following:   B-D U/F 31G X 8 MM insulin pen needle  Sig: USE  ONCE DAILY WITH  VICTOZA    Last Prescription Date:   12/30/2019  Last Fill Qty/Refills:         100 each, R-2    Last Office Visit:              07/29/2020 ([facility])   Future Office visit:           None noted   Diabetic Supplies Protocol Passed - 11/19/2020  7:40 AM     Prescription approved per [facility] Refill Protocol.  Rozina Ventura RN ....................  11/19/2020   11:46 AM

## 2021-01-03 ENCOUNTER — HEALTH MAINTENANCE LETTER (OUTPATIENT)
Age: 56
End: 2021-01-03

## 2021-01-10 DIAGNOSIS — E11.9 TYPE 2 DIABETES MELLITUS WITHOUT COMPLICATION, WITHOUT LONG-TERM CURRENT USE OF INSULIN (H): ICD-10-CM

## 2021-01-11 RX ORDER — LIRAGLUTIDE 6 MG/ML
INJECTION SUBCUTANEOUS
Qty: 9 ML | Refills: 0 | Status: SHIPPED | OUTPATIENT
Start: 2021-01-11 | End: 2021-01-22

## 2021-01-11 NOTE — TELEPHONE ENCOUNTER
[facility] sent Rx request for the following:   VICTOZA PEN 18 MG/3ML soln  Sig: INJECT 1.8 MG SUBCUTANEOUS DAILY    Last Prescription Date:   10/07/2020  Last Fill Qty/Refills:         9 mL, R-2    Last Office Visit:              07/29/2020 (Magi)   Future Office visit:           None noted   GLP-1 Agonists Protocol Passed - 1/10/2021  8:36 AM     Prescription approved per [facility] [place] Protocol.  Rozina Ventura RN ....................  1/11/2021   10:50 AM

## 2021-01-15 ENCOUNTER — ALLIED HEALTH/NURSE VISIT (OUTPATIENT)
Dept: FAMILY MEDICINE | Facility: OTHER | Age: 56
End: 2021-01-15
Attending: INTERNAL MEDICINE
Payer: COMMERCIAL

## 2021-01-15 DIAGNOSIS — Z23 NEED FOR PROPHYLACTIC VACCINATION AND INOCULATION AGAINST INFLUENZA: Primary | ICD-10-CM

## 2021-01-15 PROCEDURE — 90686 IIV4 VACC NO PRSV 0.5 ML IM: CPT

## 2021-01-15 PROCEDURE — 90471 IMMUNIZATION ADMIN: CPT

## 2021-01-15 NOTE — PROGRESS NOTES
Influenza Vaccination: Adult  Verified patient's name and . Patient stated reason for visit today is to receive Flu vaccine. Patient denied any concerns with previous influenza vaccinations. Influenza vaccination screening questions answered (see IMMUNIZATIONS for answers). Allergies reviewed. Influenza vaccine order placed per standing order. VIS handout reviewed and given to patient to take home. Influenza prepared and administered IM per standing order. Administration documented in IMMUNIZATIONS (see flowsheet and order for further information).      Angelika HINSONN, RN on 1/15/2021 at 12:51 PM

## 2021-01-22 ENCOUNTER — OFFICE VISIT (OUTPATIENT)
Dept: INTERNAL MEDICINE | Facility: OTHER | Age: 56
End: 2021-01-22
Attending: INTERNAL MEDICINE
Payer: COMMERCIAL

## 2021-01-22 VITALS
BODY MASS INDEX: 37.05 KG/M2 | RESPIRATION RATE: 16 BRPM | OXYGEN SATURATION: 95 % | HEIGHT: 64 IN | DIASTOLIC BLOOD PRESSURE: 80 MMHG | HEART RATE: 74 BPM | TEMPERATURE: 96.2 F | SYSTOLIC BLOOD PRESSURE: 120 MMHG | WEIGHT: 217 LBS

## 2021-01-22 DIAGNOSIS — Z86.0101 H/O ADENOMATOUS POLYP OF COLON: ICD-10-CM

## 2021-01-22 DIAGNOSIS — Z00.00 HEALTHCARE MAINTENANCE: ICD-10-CM

## 2021-01-22 DIAGNOSIS — I10 ESSENTIAL HYPERTENSION: ICD-10-CM

## 2021-01-22 DIAGNOSIS — E11.9 TYPE 2 DIABETES MELLITUS WITHOUT COMPLICATION, WITHOUT LONG-TERM CURRENT USE OF INSULIN (H): Primary | ICD-10-CM

## 2021-01-22 DIAGNOSIS — E78.5 HYPERLIPIDEMIA LDL GOAL <100: ICD-10-CM

## 2021-01-22 DIAGNOSIS — K21.9 GASTROESOPHAGEAL REFLUX DISEASE WITHOUT ESOPHAGITIS: ICD-10-CM

## 2021-01-22 DIAGNOSIS — F34.1 DYSTHYMIC DISORDER: ICD-10-CM

## 2021-01-22 LAB
ALBUMIN SERPL-MCNC: 4.5 G/DL (ref 3.5–5.7)
ALP SERPL-CCNC: 74 U/L (ref 34–104)
ALT SERPL W P-5'-P-CCNC: 44 U/L (ref 7–52)
ANION GAP SERPL CALCULATED.3IONS-SCNC: 7 MMOL/L (ref 3–14)
AST SERPL W P-5'-P-CCNC: 20 U/L (ref 13–39)
BILIRUB SERPL-MCNC: 0.6 MG/DL (ref 0.3–1)
BUN SERPL-MCNC: 20 MG/DL (ref 7–25)
CALCIUM SERPL-MCNC: 9.5 MG/DL (ref 8.6–10.3)
CHLORIDE SERPL-SCNC: 103 MMOL/L (ref 98–107)
CHOLEST SERPL-MCNC: 136 MG/DL
CO2 SERPL-SCNC: 31 MMOL/L (ref 21–31)
CREAT SERPL-MCNC: 0.86 MG/DL (ref 0.6–1.2)
GFR SERPL CREATININE-BSD FRML MDRD: 69 ML/MIN/{1.73_M2}
GLUCOSE SERPL-MCNC: 163 MG/DL (ref 70–105)
HBA1C MFR BLD: 7.2 % (ref 4–6)
HDLC SERPL-MCNC: 43 MG/DL (ref 23–92)
LDLC SERPL CALC-MCNC: 70 MG/DL
NONHDLC SERPL-MCNC: 93 MG/DL
POTASSIUM SERPL-SCNC: 3.9 MMOL/L (ref 3.5–5.1)
PROT SERPL-MCNC: 7.4 G/DL (ref 6.4–8.9)
SODIUM SERPL-SCNC: 141 MMOL/L (ref 134–144)
TRIGL SERPL-MCNC: 115 MG/DL
TSH SERPL DL<=0.05 MIU/L-ACNC: 3.32 IU/ML (ref 0.34–5.6)

## 2021-01-22 PROCEDURE — 80061 LIPID PANEL: CPT | Mod: ZL | Performed by: INTERNAL MEDICINE

## 2021-01-22 PROCEDURE — 83036 HEMOGLOBIN GLYCOSYLATED A1C: CPT | Mod: ZL | Performed by: INTERNAL MEDICINE

## 2021-01-22 PROCEDURE — 86803 HEPATITIS C AB TEST: CPT | Mod: ZL | Performed by: INTERNAL MEDICINE

## 2021-01-22 PROCEDURE — 99396 PREV VISIT EST AGE 40-64: CPT | Performed by: INTERNAL MEDICINE

## 2021-01-22 PROCEDURE — 36415 COLL VENOUS BLD VENIPUNCTURE: CPT | Mod: ZL | Performed by: INTERNAL MEDICINE

## 2021-01-22 PROCEDURE — 84443 ASSAY THYROID STIM HORMONE: CPT | Mod: ZL | Performed by: INTERNAL MEDICINE

## 2021-01-22 PROCEDURE — 80053 COMPREHEN METABOLIC PANEL: CPT | Mod: ZL | Performed by: INTERNAL MEDICINE

## 2021-01-22 RX ORDER — LIRAGLUTIDE 6 MG/ML
1.8 INJECTION SUBCUTANEOUS DAILY
Qty: 9 ML | Refills: 11 | Status: SHIPPED | OUTPATIENT
Start: 2021-01-22 | End: 2022-02-17

## 2021-01-22 SDOH — HEALTH STABILITY: MENTAL HEALTH: HOW OFTEN DO YOU HAVE 6 OR MORE DRINKS ON ONE OCCASION?: NOT ASKED

## 2021-01-22 SDOH — HEALTH STABILITY: MENTAL HEALTH: HOW OFTEN DO YOU HAVE A DRINK CONTAINING ALCOHOL?: 2-3 TIMES A WEEK

## 2021-01-22 SDOH — HEALTH STABILITY: MENTAL HEALTH: HOW MANY STANDARD DRINKS CONTAINING ALCOHOL DO YOU HAVE ON A TYPICAL DAY?: 1 OR 2

## 2021-01-22 ASSESSMENT — PATIENT HEALTH QUESTIONNAIRE - PHQ9
5. POOR APPETITE OR OVEREATING: SEVERAL DAYS
SUM OF ALL RESPONSES TO PHQ QUESTIONS 1-9: 14

## 2021-01-22 ASSESSMENT — ANXIETY QUESTIONNAIRES
GAD7 TOTAL SCORE: 5
6. BECOMING EASILY ANNOYED OR IRRITABLE: SEVERAL DAYS
2. NOT BEING ABLE TO STOP OR CONTROL WORRYING: SEVERAL DAYS
5. BEING SO RESTLESS THAT IT IS HARD TO SIT STILL: NOT AT ALL
IF YOU CHECKED OFF ANY PROBLEMS ON THIS QUESTIONNAIRE, HOW DIFFICULT HAVE THESE PROBLEMS MADE IT FOR YOU TO DO YOUR WORK, TAKE CARE OF THINGS AT HOME, OR GET ALONG WITH OTHER PEOPLE: SOMEWHAT DIFFICULT
3. WORRYING TOO MUCH ABOUT DIFFERENT THINGS: SEVERAL DAYS
1. FEELING NERVOUS, ANXIOUS, OR ON EDGE: NOT AT ALL
7. FEELING AFRAID AS IF SOMETHING AWFUL MIGHT HAPPEN: SEVERAL DAYS

## 2021-01-22 ASSESSMENT — MIFFLIN-ST. JEOR: SCORE: 1565.82

## 2021-01-22 ASSESSMENT — PAIN SCALES - GENERAL: PAINLEVEL: NO PAIN (0)

## 2021-01-22 NOTE — PROGRESS NOTES
Chief Complaint:  This patient is here for a comprehensive review of their multiple medical problems, renewal of medications and update on necessary health maintenance issues.      HPI: She is here today for a physical.  In most respects she feels well.  She has type 2 diabetes mellitus.  She does not tolerate Metformin.  She is on low-dose glipizide as well as Victoza daily.  She admits that her diet is poor and she does not exercise and she feels that her diabetes is probably not doing well.  She is due for recheck on this.    She has a history of reflux disease.  She has been having some bile or acid reflux which is causing her voice to be a little bit hoarse.  This is despite the fact that she takes omeprazole.  We talked about this today and it probably is in part due to her diet as well as her weight.    She has treated hypertension with excellent blood pressure control.  She has dysthymia and takes citalopram on a daily basis.  She has hyperlipidemia and is on moderate intensity statin.  No known vascular disease.    Medications are reconciled.  Past medical history, past surgical history, family history and social histories are reviewed and updated.  Colonoscopy is up-to-date.  Mammogram will be due in March.  Immunizations are up-to-date but I recommended that she look into getting the shingles vaccine.    Past Medical History:   Diagnosis Date     Adenomatous colon polyp      Essential (primary) hypertension     11/6/2014     Gastro-esophageal reflux disease without esophagitis     No Comments Provided     Generalized anxiety disorder     No Comments Provided     Major depressive disorder, single episode     No Comments Provided     Type 2 diabetes mellitus without complications (H)     No Comments Provided       Past Surgical History:   Procedure Laterality Date     BREAST BIOPSY, RT/LT Left 02/2019     CHOLECYSTECTOMY      No Comments Provided     COLONOSCOPY  01/01/2010 2010,Normal, neg. for occult  colitis, due 2020     COLONOSCOPY  10/12/2020    F/U 2025 tubular adenoma     DILATION AND CURETTAGE      2004,Also endometrial ablation     ESOPHAGOSCOPY, GASTROSCOPY, DUODENOSCOPY (EGD), COMBINED      2011,Reactive gastropathy, mild to moderate refulux changes.     HYSTERECTOMY TOTAL ABDOMINAL      2006,LAVH/LSO     LAMINECT/DISCECTOMY, CERVICAL  2008     LAPAROSCOPIC TUBAL LIGATION      No Comments Provided     LAPAROSCOPY DIAGNOSTIC (GYN)      2007,diagnostic laparoscopy With lysis of adhesions     RELEASE CARPAL TUNNEL Bilateral        Current Outpatient Medications   Medication Sig Dispense Refill     atorvastatin (LIPITOR) 20 MG tablet Take 1 tablet (20 mg) by mouth daily 90 tablet 3     B-D U/F 31G X 8 MM insulin pen needle USE  ONCE DAILY WITH  VICTOZA 100 each 0     cholecalciferol (VITAMIN D3) 1000 UNIT tablet Take 1 tablet (1,000 Units) by mouth daily 100 tablet 3     citalopram (CELEXA) 20 MG tablet Take 1 tablet (20 mg) by mouth daily 90 tablet 3     CONTOUR NEXT TEST test strip USE  TO CHECK GLUCOSE TWICE DAILY 200 strip 0     glipiZIDE (GLUCOTROL XL) 5 MG 24 hr tablet Take 1 tablet (5 mg) by mouth daily 90 tablet 3     ketoconazole (NIZORAL) 2 % cream APPLY A THIN FILM OF CREAM TOPICALLY TWICE DAILY. 15 g 3     liraglutide (VICTOZA PEN) 18 MG/3ML solution Inject 1.8 mg Subcutaneous daily 9 mL 11     lisinopril (ZESTRIL) 20 MG tablet Take 1 tablet (20 mg) by mouth daily 90 tablet 3     Microlet Lancets MISC USE   TO CHECK GLUCOSE TWICE DAILY 200 each 0     omeprazole (PRILOSEC) 20 MG CR capsule TAKE ONE CAPSULE BY MOUTH ONCE DAILY BEFORE A MEAL         Allergies   Allergen Reactions     Celecoxib Unknown     Metformin Diarrhea       Family History   Problem Relation Age of Onset     Other - See Comments Mother         Psychiatric illness,Some type of mental illness.     Lung Cancer Mother      Diabetes Father      Hypertension Father      Cancer Father         Urothelial cancer     Diabetes Maternal  Grandmother         Diabetes     Diabetes Maternal Grandfather         Diabetes     Diabetes Paternal Grandmother         Diabetes     Diabetes Paternal Grandfather         Diabetes     Other - See Comments Other         Hysterectomy for prolapse.     Other - See Comments Other         Psychiatric illness,Bipolar disease.     Hypertension Brother      Coronary Artery Disease Brother         Stents     Diabetes Brother      Pancreatitis Brother        Social History     Socioeconomic History     Marital status:      Spouse name: Not on file     Number of children: Not on file     Years of education: Not on file     Highest education level: Not on file   Occupational History     Occupation: TELLER     Employer: OTHER   Social Needs     Financial resource strain: Not on file     Food insecurity     Worry: Not on file     Inability: Not on file     Transportation needs     Medical: Not on file     Non-medical: Not on file   Tobacco Use     Smoking status: Former Smoker     Types: Cigarettes     Quit date: 2002     Years since quittin.9     Smokeless tobacco: Never Used   Substance and Sexual Activity     Alcohol use: Yes     Frequency: 2-3 times a week     Drinks per session: 1 or 2     Comment: occasional     Drug use: No     Comment: Drug use: No     Sexual activity: Yes     Partners: Male   Lifestyle     Physical activity     Days per week: Not on file     Minutes per session: Not on file     Stress: Not on file   Relationships     Social connections     Talks on phone: Not on file     Gets together: Not on file     Attends Evangelical service: Not on file     Active member of club or organization: Not on file     Attends meetings of clubs or organizations: Not on file     Relationship status: Not on file     Intimate partner violence     Fear of current or ex partner: Not on file     Emotionally abused: Not on file     Physically abused: Not on file     Forced sexual activity: Not on file   Other  Topics Concern     Parent/sibling w/ CABG, MI or angioplasty before 65F 55M? Not Asked   Social History Narrative    , two children, working at [employer]. Lives in [place].   employed at [employer].       Review of Systems    Physical Exam  Vitals signs and nursing note reviewed.   Constitutional:       General: She is not in acute distress.     Appearance: She is well-developed. She is not diaphoretic.   HENT:      Head: Normocephalic.      Right Ear: External ear normal.      Left Ear: External ear normal.      Mouth/Throat:      Pharynx: No oropharyngeal exudate.   Eyes:      Conjunctiva/sclera: Conjunctivae normal.      Pupils: Pupils are equal, round, and reactive to light.   Neck:      Musculoskeletal: Normal range of motion and neck supple.      Thyroid: No thyromegaly.      Vascular: Normal carotid pulses. No carotid bruit or JVD.      Trachea: No tracheal deviation.   Cardiovascular:      Rate and Rhythm: Normal rate and regular rhythm.      Heart sounds: Normal heart sounds. No murmur. No friction rub. No gallop.    Pulmonary:      Effort: Pulmonary effort is normal. No respiratory distress.      Breath sounds: Normal breath sounds. No wheezing or rales.   Chest:      Breasts:         Right: No inverted nipple, mass, nipple discharge, skin change or tenderness.         Left: No inverted nipple, mass, nipple discharge, skin change or tenderness.   Abdominal:      General: Bowel sounds are normal. There is no distension.      Palpations: Abdomen is soft. There is no mass.      Tenderness: There is no abdominal tenderness. There is no rebound.   Musculoskeletal: Normal range of motion.   Lymphadenopathy:      Cervical: No cervical adenopathy.   Skin:     General: Skin is warm and dry.      Findings: No rash.   Neurological:      Mental Status: She is alert and oriented to person, place, and time.      Cranial Nerves: No cranial nerve deficit.      Coordination: Coordination normal.       Deep Tendon Reflexes: Reflexes are normal and symmetric.   Psychiatric:         Behavior: Behavior normal.         Assessment:      ICD-10-CM    1. Type 2 diabetes mellitus without complication, without long-term current use of insulin (H)  E11.9 Comprehensive metabolic panel     Lipid Profile     Hemoglobin A1c     TSH     Hepatitis C antibody     liraglutide (VICTOZA PEN) 18 MG/3ML solution   2. Essential hypertension  I10    3. Gastroesophageal reflux disease without esophagitis  K21.9    4. Dysthymic disorder  F34.1    5. H/O adenomatous polyp of colon  Z86.010    6. Healthcare maintenance  Z00.00    7. Hyperlipidemia LDL goal <100  E78.5         Plan: Aside from her weight, her exam today is normal.  It is likely that her diabetes is not well controlled based on her history.  Complete lab drawn and pending, I will send her a letter with the results.  At this point in time we could consider increasing glipizide or adding SGLT2 inhibitor depending on her results.  In regards to her reflux, I think this is probably more related to her weight and diet.  She knows that she needs to eat healthy and start an exercise program.  For now, medications are refilled without change pending her lab results.  Follow-up with me will be dependent on any changes we make in the results as they come in.  Her first Shingrix vaccine given today.  Mammogram in March.

## 2021-01-22 NOTE — LETTER
January 25, 2021      Jackie Donahue  [address] [address] [address]        Dear Liudmila,    Below are the results of your recent labs:    Results for orders placed or performed in visit on 01/22/21   Hepatitis C antibody     Status: None   Result Value Ref Range    Hepatitis C Antibody Nonreactive NR^Nonreactive   TSH     Status: None   Result Value Ref Range    Thyrotropin 3.32 0.34 - 5.60 IU/mL   Hemoglobin A1c     Status: Abnormal   Result Value Ref Range    Hemoglobin A1C 7.2 (H) 4.0 - 6.0 %   Lipid Profile     Status: None   Result Value Ref Range    Cholesterol 136 <200 mg/dL    Triglycerides 115 <150 mg/dL    HDL Cholesterol 43 23 - 92 mg/dL    LDL Cholesterol Calculated 70 <100 mg/dL    Non HDL Cholesterol 93 <130 mg/dL   Comprehensive metabolic panel     Status: Abnormal   Result Value Ref Range    Sodium 141 134 - 144 mmol/L    Potassium 3.9 3.5 - 5.1 mmol/L    Chloride 103 98 - 107 mmol/L    Carbon Dioxide 31 21 - 31 mmol/L    Anion Gap 7 3 - 14 mmol/L    Glucose 163 (H) 70 - 105 mg/dL    Urea Nitrogen 20 7 - 25 mg/dL    Creatinine 0.86 0.60 - 1.20 mg/dL    GFR Estimate 69 >60 mL/min/[1.73_m2]    GFR Estimate If Black 83 >60 mL/min/[1.73_m2]    Calcium 9.5 8.6 - 10.3 mg/dL    Bilirubin Total 0.6 0.3 - 1.0 mg/dL    Albumin 4.5 3.5 - 5.7 g/dL    Protein Total 7.4 6.4 - 8.9 g/dL    Alkaline Phosphatase 74 34 - 104 U/L    ALT 44 7 - 52 U/L    AST 20 13 - 39 U/L        Overall, your blood tests look great.  Your diabetes test looks better than it has.  Congratulations on that report.  Make sure that you work hard on a healthy diet and exercise.  Let us plan to recheck your diabetes in 4 months.    Sincerely,        Richard Sow MD  Internal Medicine  [facility]

## 2021-01-23 ASSESSMENT — ANXIETY QUESTIONNAIRES: GAD7 TOTAL SCORE: 5

## 2021-01-24 LAB — HCV AB SERPL QL IA: NONREACTIVE

## 2021-02-04 ENCOUNTER — MYC MEDICAL ADVICE (OUTPATIENT)
Dept: INTERNAL MEDICINE | Facility: OTHER | Age: 56
End: 2021-02-04

## 2021-02-05 NOTE — TELEPHONE ENCOUNTER
Called injection nurse at [phone] and was informed that this patient's paperwork is currently with her . Pt will be contacted shortly by  to make appointment for liset samuels.   Melissa Rivera LPN on 2/5/2021 at 8:16 AM

## 2021-02-19 ENCOUNTER — ALLIED HEALTH/NURSE VISIT (OUTPATIENT)
Dept: FAMILY MEDICINE | Facility: OTHER | Age: 56
End: 2021-02-19
Attending: INTERNAL MEDICINE
Payer: COMMERCIAL

## 2021-02-19 DIAGNOSIS — Z23 NEED FOR ZOSTER VACCINATION: Primary | ICD-10-CM

## 2021-02-19 PROCEDURE — 90750 HZV VACC RECOMBINANT IM: CPT

## 2021-02-19 PROCEDURE — 90471 IMMUNIZATION ADMIN: CPT

## 2021-02-19 NOTE — PROGRESS NOTES
Verified patient's first and last name, and . Patient stated reason for visit today is to receive initial Shingrix vaccination. Patient denied any concerns with previous injections. Shingrix prepared and administered IM into right deltoid as ordered. Administration of medication documented in MAR (see MAR for further information regarding dose, lot #, NDC #, expiration date). Patient encouraged to wait in lobby for 15 minutes post-injection and notify staff immediately of any reaction.       Lisa Llanes RN  ....................  2021   8:55 AM

## 2021-02-23 DIAGNOSIS — R21 RASH AND NONSPECIFIC SKIN ERUPTION: ICD-10-CM

## 2021-02-25 RX ORDER — KETOCONAZOLE 20 MG/G
CREAM TOPICAL
Qty: 15 G | Refills: 3 | Status: SHIPPED | OUTPATIENT
Start: 2021-02-25 | End: 2023-07-27

## 2021-02-25 NOTE — TELEPHONE ENCOUNTER
ketoconazole (NIZORAL) 2 % cream  Sig: APPLY A THIN FILM OF CREAM TOPICALLY TWICE DAILY.  Last Written Prescription Date:  10/26/2018  Last Fill Quantity: 15g,  # refills: 3   Last office visit: 1/22/2021 with prescribing provider   Future Office Visit: none  Antifungal Agents Passed     Prescription refilled per RN Medication Refill Policy.................... Lisa Llanes RN ....................  2/25/2021   8:18 AM

## 2021-02-27 DIAGNOSIS — E11.9 TYPE 2 DIABETES MELLITUS WITHOUT COMPLICATION, WITHOUT LONG-TERM CURRENT USE OF INSULIN (H): ICD-10-CM

## 2021-02-27 DIAGNOSIS — E11.9 TYPE 2 DIABETES MELLITUS WITHOUT COMPLICATION (H): ICD-10-CM

## 2021-03-01 RX ORDER — PEN NEEDLE, DIABETIC 31 GX5/16"
NEEDLE, DISPOSABLE MISCELLANEOUS
Qty: 100 EACH | Refills: 0 | Status: SHIPPED | OUTPATIENT
Start: 2021-03-01 | End: 2021-05-26

## 2021-03-01 RX ORDER — LANCETS
EACH MISCELLANEOUS
Qty: 200 EACH | Refills: 0 | Status: SHIPPED | OUTPATIENT
Start: 2021-03-01 | End: 2021-09-21

## 2021-03-01 NOTE — TELEPHONE ENCOUNTER
[facility] sent Rx request for the following:      B-D UF III SHORT PEN NEED MIS  Sig: USE 1  ONCE DAILY WITH  VICTOZA      Last Prescription Date:   11/19/2020  Last Fill Qty/Refills:         100, R-0    Last Office Visit:              1/22/2021   Future Office visit:           none       MICROLET LANCETS MIS  Sig: USE TO CHECK GLUCOSE TWICE DAILY      Last Prescription Date:   11/16/2020  Last Fill Qty/Refills:         200, R-0        Prescription refilled per RN Medication Refill Policy.................... Hakan Mcallister RN ....................  3/1/2021   10:01 AM

## 2021-04-01 ENCOUNTER — HOSPITAL ENCOUNTER (OUTPATIENT)
Dept: MAMMOGRAPHY | Facility: OTHER | Age: 56
Discharge: HOME | End: 2021-04-01
Attending: INTERNAL MEDICINE | Admitting: INTERNAL MEDICINE
Payer: COMMERCIAL

## 2021-04-01 DIAGNOSIS — Z12.31 VISIT FOR SCREENING MAMMOGRAM: ICD-10-CM

## 2021-04-01 DIAGNOSIS — E11.9 TYPE 2 DIABETES MELLITUS WITHOUT COMPLICATION (H): ICD-10-CM

## 2021-04-01 PROCEDURE — 77063 BREAST TOMOSYNTHESIS BI: CPT

## 2021-04-01 NOTE — TELEPHONE ENCOUNTER
[facility] sent Rx request for the following:      Contour Next Test In Vitro Strip  Sig: USE  TO CHECK GLUCOSE TWICE DAILY      Last Prescription Date:   9/17/2020  Last Fill Qty/Refills:         200, R-0    Last Office Visit:              1/22/2021   Future Office visit:           none    Prescription refilled per RN Medication Refill Policy.................... Hakan Mcallister RN ....................  4/1/2021   8:40 AM

## 2021-04-08 NOTE — TELEPHONE ENCOUNTER
Prescription approved per [facility].  Callie Galo RN on 4/3/2019 at 9:53 AM     normal... Abdomen soft, non-tender and non-distended, no rebound, no guarding and no masses. no hepatosplenomegaly.

## 2021-04-11 ENCOUNTER — MYC MEDICAL ADVICE (OUTPATIENT)
Dept: INTERNAL MEDICINE | Facility: OTHER | Age: 56
End: 2021-04-11

## 2021-05-04 ENCOUNTER — OFFICE VISIT (OUTPATIENT)
Dept: INTERNAL MEDICINE | Facility: OTHER | Age: 56
End: 2021-05-04
Attending: INTERNAL MEDICINE
Payer: COMMERCIAL

## 2021-05-04 VITALS
BODY MASS INDEX: 37.99 KG/M2 | DIASTOLIC BLOOD PRESSURE: 80 MMHG | RESPIRATION RATE: 14 BRPM | SYSTOLIC BLOOD PRESSURE: 122 MMHG | WEIGHT: 219.8 LBS | OXYGEN SATURATION: 97 % | HEART RATE: 67 BPM | TEMPERATURE: 96.9 F

## 2021-05-04 DIAGNOSIS — E11.9 TYPE 2 DIABETES MELLITUS WITHOUT COMPLICATION, WITHOUT LONG-TERM CURRENT USE OF INSULIN (H): Primary | ICD-10-CM

## 2021-05-04 DIAGNOSIS — R30.0 DYSURIA: ICD-10-CM

## 2021-05-04 DIAGNOSIS — N95.2 ATROPHIC VAGINITIS: ICD-10-CM

## 2021-05-04 LAB
ALBUMIN UR-MCNC: NEGATIVE MG/DL
APPEARANCE UR: CLEAR
BILIRUB UR QL STRIP: NEGATIVE
COLOR UR AUTO: ABNORMAL
GLUCOSE UR STRIP-MCNC: NEGATIVE MG/DL
HBA1C MFR BLD: 8 % (ref 4–6)
HGB UR QL STRIP: NEGATIVE
KETONES UR STRIP-MCNC: NEGATIVE MG/DL
LEUKOCYTE ESTERASE UR QL STRIP: NEGATIVE
MUCOUS THREADS #/AREA URNS LPF: PRESENT /LPF
NITRATE UR QL: NEGATIVE
PH UR STRIP: 5.5 PH (ref 5–7)
RBC #/AREA URNS AUTO: 0 /HPF (ref 0–2)
SOURCE: ABNORMAL
SP GR UR STRIP: 1.02 (ref 1–1.03)
UROBILINOGEN UR STRIP-MCNC: NORMAL MG/DL (ref 0–2)
WBC #/AREA URNS AUTO: <1 /HPF (ref 0–5)

## 2021-05-04 PROCEDURE — 83036 HEMOGLOBIN GLYCOSYLATED A1C: CPT | Mod: ZL | Performed by: INTERNAL MEDICINE

## 2021-05-04 PROCEDURE — 99214 OFFICE O/P EST MOD 30 MIN: CPT | Performed by: INTERNAL MEDICINE

## 2021-05-04 PROCEDURE — 36415 COLL VENOUS BLD VENIPUNCTURE: CPT | Mod: ZL | Performed by: INTERNAL MEDICINE

## 2021-05-04 PROCEDURE — 81001 URINALYSIS AUTO W/SCOPE: CPT | Mod: ZL | Performed by: INTERNAL MEDICINE

## 2021-05-04 RX ORDER — GLIPIZIDE 5 MG/1
TABLET, FILM COATED, EXTENDED RELEASE ORAL
Qty: 90 TABLET | Refills: 3 | COMMUNITY
Start: 2021-05-04 | End: 2021-05-26

## 2021-05-04 ASSESSMENT — ENCOUNTER SYMPTOMS
ENDOCRINE NEGATIVE: 1
CONSTITUTIONAL NEGATIVE: 1
ALLERGIC/IMMUNOLOGIC NEGATIVE: 1
HEMATOLOGIC/LYMPHATIC NEGATIVE: 1

## 2021-05-04 ASSESSMENT — PAIN SCALES - GENERAL: PAINLEVEL: NO PAIN (0)

## 2021-05-04 NOTE — NURSING NOTE
Patient presents to the clinic today for her 3 month diabetic check.  Also has complaints of urinary frequency and burning. Been going on for about 3 weeks on/off.    Morgan Jarrell LPN on 5/4/2021 at 8:24 AM

## 2021-05-04 NOTE — LETTER
May 4, 2021      Jackie Donahue  [address] [address] [address]        Dear Liudmila,    Below are the results of your recent labs:    Results for orders placed or performed in visit on 05/04/21   UA with Microscopic reflex to Culture     Status: Abnormal    Specimen: Midstream Urine   Result Value Ref Range    Color Urine Light Yellow     Appearance Urine Clear     Glucose Urine Negative NEG^Negative mg/dL    Bilirubin Urine Negative NEG^Negative    Ketones Urine Negative NEG^Negative mg/dL    Specific Gravity Urine 1.021 1.003 - 1.035    Blood Urine Negative NEG^Negative    pH Urine 5.5 5.0 - 7.0 pH    Protein Albumin Urine Negative NEG^Negative mg/dL    Urobilinogen mg/dL Normal 0.0 - 2.0 mg/dL    Nitrite Urine Negative NEG^Negative    Leukocyte Esterase Urine Negative NEG^Negative    Source Midstream Urine     WBC Urine <1 0 - 5 /HPF    RBC Urine 0 0 - 2 /HPF    Mucous Urine Present (A) NEG^Negative /LPF   Hemoglobin A1c     Status: Abnormal   Result Value Ref Range    Hemoglobin A1C 8.0 (H) 4.0 - 6.0 %        Your A1c is indeed quite high.  I want you to increase to 3 glipizide tablets daily.  You can take 2 in the morning and 1 in the evening or vice versa.  I will want you back in 3 months to recheck your A1c.  If you need a new prescription sent to your pharmacy, let me know.    Sincerely,        Richard Sow MD  Internal Medicine  [facility]

## 2021-05-04 NOTE — PROGRESS NOTES
Chief Complaint: Follow-up on diabetes and urinary complaints.    HPI: She is here today for follow-up on her diabetes.  Her blood sugars have been averaging just over 200 each morning when she checks them.  Her weight is up a little bit but she is trying to walk every day.  She is intolerant of Metformin.  She is on glipizide as well as Victoza and seems to tolerate those medications without any difficulty at all.  She is frustrated by the fact that her blood sugars are high despite the fact that she is walking.    She is also having some urinary symptoms.  She has some burning or discomfort at the end of urination and she is also having a little bit of discomfort after intercourse.  No discharge.  She typically does not have a lot of urinary complaints or issues.    Medications are reconciled.  She was in 3 months ago for complete evaluation.  Her A1c at that time was 7.2%.  She did get her mammogram and that was acceptable.  She has had the Covid vaccine and her first Shingrix vaccine.    Past Medical History:   Diagnosis Date     Adenomatous colon polyp      Essential (primary) hypertension     11/6/2014     Gastro-esophageal reflux disease without esophagitis     No Comments Provided     Generalized anxiety disorder     No Comments Provided     Major depressive disorder, single episode     No Comments Provided     Type 2 diabetes mellitus without complications (H)     No Comments Provided       Past Surgical History:   Procedure Laterality Date     BREAST BIOPSY, RT/LT Left 02/2019     CHOLECYSTECTOMY      No Comments Provided     COLONOSCOPY  01/01/2010 2010,Normal, neg. for occult colitis, due 2020     COLONOSCOPY  10/12/2020    F/U 2025 tubular adenoma     DILATION AND CURETTAGE      2004,Also endometrial ablation     ESOPHAGOSCOPY, GASTROSCOPY, DUODENOSCOPY (EGD), COMBINED      2011,Reactive gastropathy, mild to moderate refulux changes.     HYSTERECTOMY TOTAL ABDOMINAL      2006,LAVH/LSO      LAMINECT/DISCECTOMY, CERVICAL  2008     LAPAROSCOPIC TUBAL LIGATION      No Comments Provided     LAPAROSCOPY DIAGNOSTIC (GYN)      2007,diagnostic laparoscopy With lysis of adhesions     RELEASE CARPAL TUNNEL Bilateral        Allergies   Allergen Reactions     Celecoxib Unknown     Metformin Diarrhea       Current Outpatient Medications   Medication Sig Dispense Refill     atorvastatin (LIPITOR) 20 MG tablet Take 1 tablet (20 mg) by mouth daily 90 tablet 3     B-D U/F 31G X 8 MM insulin pen needle USE 1  ONCE DAILY WITH  VICTOZA 100 each 0     cholecalciferol (VITAMIN D3) 1000 UNIT tablet Take 1 tablet (1,000 Units) by mouth daily 100 tablet 3     citalopram (CELEXA) 20 MG tablet Take 1 tablet (20 mg) by mouth daily 90 tablet 3     [START ON 5/6/2021] conjugated estrogens (PREMARIN) 0.625 MG/GM vaginal cream Place 1 g vaginally twice a week 30 g 3     CONTOUR NEXT TEST test strip USE TO CHECK GLUCOSE TWICE DAILY 200 strip 0     glipiZIDE (GLUCOTROL XL) 5 MG 24 hr tablet Take 1 tablet (5 mg) by mouth daily 90 tablet 3     ketoconazole (NIZORAL) 2 % external cream APPLY A THIN FILM OF CREAM TOPICALLY TWICE A DAY 15 g 3     liraglutide (VICTOZA PEN) 18 MG/3ML solution Inject 1.8 mg Subcutaneous daily 9 mL 11     lisinopril (ZESTRIL) 20 MG tablet Take 1 tablet (20 mg) by mouth daily 90 tablet 3     Microlet Lancets MISC USE   TO CHECK GLUCOSE TWICE DAILY 200 each 0     omeprazole (PRILOSEC) 20 MG CR capsule TAKE ONE CAPSULE BY MOUTH ONCE DAILY BEFORE A MEAL         Social History     Socioeconomic History     Marital status:      Spouse name: Not on file     Number of children: Not on file     Years of education: Not on file     Highest education level: Not on file   Occupational History     Occupation: TELLER     Employer: OTHER   Social Needs     Financial resource strain: Not on file     Food insecurity     Worry: Not on file     Inability: Not on file     Transportation needs     Medical: Not on file      Non-medical: Not on file   Tobacco Use     Smoking status: Former Smoker     Types: Cigarettes     Quit date: 2002     Years since quittin.1     Smokeless tobacco: Never Used   Substance and Sexual Activity     Alcohol use: Yes     Frequency: 2-3 times a week     Drinks per session: 1 or 2     Comment: occasional     Drug use: No     Comment: Drug use: No     Sexual activity: Yes     Partners: Male   Lifestyle     Physical activity     Days per week: Not on file     Minutes per session: Not on file     Stress: Not on file   Relationships     Social connections     Talks on phone: Not on file     Gets together: Not on file     Attends Christian service: Not on file     Active member of club or organization: Not on file     Attends meetings of clubs or organizations: Not on file     Relationship status: Not on file     Intimate partner violence     Fear of current or ex partner: Not on file     Emotionally abused: Not on file     Physically abused: Not on file     Forced sexual activity: Not on file   Other Topics Concern     Parent/sibling w/ CABG, MI or angioplasty before 65F 55M? Not Asked   Social History Narrative    , two children, working at [employer]. Lives in [place].   employed at [employer].       Review of Systems   Constitutional: Negative.    Endocrine: Negative.    Skin: Negative.    Allergic/Immunologic: Negative.    Hematological: Negative.        Physical Exam  Vitals signs and nursing note reviewed.   Constitutional:       General: She is not in acute distress.     Appearance: Normal appearance. She is not ill-appearing, toxic-appearing or diaphoretic.   Neurological:      Mental Status: She is alert.         Assessment:      ICD-10-CM    1. Type 2 diabetes mellitus without complication, without long-term current use of insulin (H)  E11.9 Hemoglobin A1c   2. Dysuria  R30.0 UA with Microscopic reflex to Culture   3. Atrophic vaginitis  N95.2 conjugated estrogens  (PREMARIN) 0.625 MG/GM vaginal cream       Plan: Her urine today is normal.  I think it is quite likely that some of her symptoms that she is describing could be related to some atrophic vaginitis.  This was reviewed with the patient and I put her on a trial of Premarin vaginal cream twice weekly.  We will see how things progress with that going forward.  If she has continued symptoms, consider GYN referral.    In regards to her diabetes, A1c is pending and I will message her the results.  It is likely that I will increase the glipizide, the amount that I will recommend increasing will depend on the value of her A1c.  Encourage continued diet and exercise.  Follow-up will be dependent on her clinical course and her A1c result.

## 2021-05-25 DIAGNOSIS — E11.9 TYPE 2 DIABETES MELLITUS WITHOUT COMPLICATION, WITHOUT LONG-TERM CURRENT USE OF INSULIN (H): ICD-10-CM

## 2021-05-25 DIAGNOSIS — E11.9 TYPE 2 DIABETES MELLITUS WITHOUT COMPLICATION (H): ICD-10-CM

## 2021-05-26 RX ORDER — GLIPIZIDE 5 MG/1
TABLET, FILM COATED, EXTENDED RELEASE ORAL
Qty: 270 TABLET | Refills: 1 | Status: SHIPPED | OUTPATIENT
Start: 2021-05-26 | End: 2021-09-22

## 2021-05-26 RX ORDER — PEN NEEDLE, DIABETIC 31 GX5/16"
NEEDLE, DISPOSABLE MISCELLANEOUS
Qty: 100 EACH | Refills: 0 | Status: SHIPPED | OUTPATIENT
Start: 2021-05-26 | End: 2021-09-21

## 2021-05-26 NOTE — TELEPHONE ENCOUNTER
[facility] sent Rx request for the following:   glipiZIDE (GLUCOTROL XL) 5 MG 24 hr tablet   Sig: Take 1 tablet by mouth once daily    Last Prescription Date:   05/04/2021 (Historical)  Last Fill Qty/Refills:         90, R-3    Sulfonylurea Agents Passed - 5/25/2021  7:17 AM       B-D U/F 31G X 8 MM insulin pen needle  Sig: USE 1  ONCE DAILY WITH  VICTOZA    Last Prescription Date:   03/01/2021  Last Fill Qty/Refills:         100, R-0    Diabetic Supplies Protocol Passed - 5/25/2021  7:17 AM       Last Office Visit:              05/04/2021 ([facility])   Future Office visit:           None noted      Glipizide order to be reviewed, request states once daily. Historical notation states 2 tablets in morning, 1 in evening. Routing for PCP review. Osman-d up to reflect historical notation. Unable to complete prescription refill per RN Medication Refill Policy.................... Rozina Sheikh RN ....................  5/26/2021   8:12 AM

## 2021-07-07 ENCOUNTER — ALLIED HEALTH/NURSE VISIT (OUTPATIENT)
Dept: FAMILY MEDICINE | Facility: OTHER | Age: 56
End: 2021-07-07
Attending: INTERNAL MEDICINE
Payer: COMMERCIAL

## 2021-07-07 DIAGNOSIS — Z23 NEED FOR ZOSTER VACCINATION: Primary | ICD-10-CM

## 2021-07-07 PROCEDURE — 90471 IMMUNIZATION ADMIN: CPT

## 2021-07-07 PROCEDURE — 90750 HZV VACC RECOMBINANT IM: CPT

## 2021-07-07 NOTE — PROGRESS NOTES
Immunization Documentation - Shingrix #2  Verified patient's first and last name, and . Stated reason for visit today is to receive the Shingrix vaccine. Denied any concerns with previous immunizations. Allergies reviewed. VIS handout(s) reviewed and given to take home. Shingrix prepared and administered IM per standing order. Administration documented in IMMUNIZATIONS (see flowsheet and order for further information). Patient Instructed to wait in lobby for 15 minutes post-injection and notify staff immediately of any reaction.     Lisa Llanes RN ....................  2021   1:05 PM

## 2021-08-08 ENCOUNTER — MYC MEDICAL ADVICE (OUTPATIENT)
Dept: INTERNAL MEDICINE | Facility: OTHER | Age: 56
End: 2021-08-08

## 2021-08-23 DIAGNOSIS — I10 ESSENTIAL HYPERTENSION: ICD-10-CM

## 2021-08-23 DIAGNOSIS — F34.1 DYSTHYMIC DISORDER: ICD-10-CM

## 2021-08-23 DIAGNOSIS — E78.5 HYPERLIPIDEMIA LDL GOAL <100: ICD-10-CM

## 2021-08-24 RX ORDER — LISINOPRIL 20 MG/1
TABLET ORAL
Qty: 90 TABLET | Refills: 0 | Status: SHIPPED | OUTPATIENT
Start: 2021-08-24 | End: 2021-11-16

## 2021-08-24 RX ORDER — CITALOPRAM HYDROBROMIDE 20 MG/1
TABLET ORAL
Qty: 90 TABLET | Refills: 0 | Status: SHIPPED | OUTPATIENT
Start: 2021-08-24 | End: 2021-11-16

## 2021-08-24 RX ORDER — ATORVASTATIN CALCIUM 20 MG/1
TABLET, FILM COATED ORAL
Qty: 90 TABLET | Refills: 0 | Status: SHIPPED | OUTPATIENT
Start: 2021-08-24 | End: 2021-11-16

## 2021-08-27 NOTE — TELEPHONE ENCOUNTER
After verifying name and birth date, patient was notified of provider's response.  She will call back to schedule.  Nelsy Thomas([facility])........8/27/2021  12:44 PM

## 2021-09-07 NOTE — TELEPHONE ENCOUNTER
Impression:  1. Acute blood loss anemia - differential includes PUD, stress ulcers, less likely small bowel angioectasia/source  2. Severe Crohn's disease with predominantly colonic disease - complicated by C diff and failed steroid rescue and previous infliximab, now s/p total colectomy with ileostomy  3. Altered mental status - suspect delirium given prolonged hospitalization    Recommend:  -Discussed with daughter at bedside. Given brown stools now, can consider trial of PPI first as if he has PUD and see if it will stop the bleeding, vs performing EGD which will require some sedation; explained risks/benefits including risks of bleeding/infection/perforation  -She would like to try conservative therapy first with PPI  -Would given bolus of Protonix 80mg followed by continuous infusion  -Follow-up serial CBC  -Keep NPO  -Will follow; if there is continued blood loss despite PPI, will plan for EGD  -Discussed with SICU team    Seymour Concepcion MD  GI iphone: [phone]  GI e-mail: [email or web address]   Patient Information     Patient Name MRJackie Crawford [id number] Female 1965      Telephone Encounter by Linh Hedrick at 2017  7:02 AM     Author:  Linh Hedrick Service:  (none) Author Type:  (none)     Filed:  2017  7:15 AM Encounter Date:  2017 Status:  Signed     :  Linh Hedrick            Patient started a new medication a few weeks ago, she is on her 7th day of diarrhea. She was wondering if she could speak with someone regarding this?    Linh Hedrick ....................  2017   7:15 AM

## 2021-09-20 DIAGNOSIS — E11.9 TYPE 2 DIABETES MELLITUS WITHOUT COMPLICATION, WITHOUT LONG-TERM CURRENT USE OF INSULIN (H): ICD-10-CM

## 2021-09-20 DIAGNOSIS — E11.9 TYPE 2 DIABETES MELLITUS WITHOUT COMPLICATION (H): ICD-10-CM

## 2021-09-21 RX ORDER — PEN NEEDLE, DIABETIC 31 GX5/16"
NEEDLE, DISPOSABLE MISCELLANEOUS
Qty: 100 EACH | Refills: 1 | Status: SHIPPED | OUTPATIENT
Start: 2021-09-21 | End: 2022-04-07

## 2021-09-21 RX ORDER — LANCETS
EACH MISCELLANEOUS
Qty: 200 EACH | Refills: 0 | Status: SHIPPED | OUTPATIENT
Start: 2021-09-21 | End: 2022-04-07

## 2021-09-21 NOTE — TELEPHONE ENCOUNTER
Prescription approved per [facility] Refill Protocol.  LOV: 5/4/2021  Patient noted to have an appointment tomorrow.  Ruchi Clay RN on 9/21/2021 at 3:16 PM

## 2021-09-22 ENCOUNTER — OFFICE VISIT (OUTPATIENT)
Dept: INTERNAL MEDICINE | Facility: OTHER | Age: 56
End: 2021-09-22
Attending: INTERNAL MEDICINE
Payer: COMMERCIAL

## 2021-09-22 VITALS
RESPIRATION RATE: 18 BRPM | DIASTOLIC BLOOD PRESSURE: 88 MMHG | TEMPERATURE: 97.3 F | HEART RATE: 71 BPM | WEIGHT: 218 LBS | SYSTOLIC BLOOD PRESSURE: 138 MMHG | OXYGEN SATURATION: 97 % | BODY MASS INDEX: 37.22 KG/M2 | HEIGHT: 64 IN

## 2021-09-22 DIAGNOSIS — E11.9 TYPE 2 DIABETES MELLITUS WITHOUT COMPLICATION, WITHOUT LONG-TERM CURRENT USE OF INSULIN (H): Primary | ICD-10-CM

## 2021-09-22 PROCEDURE — 36415 COLL VENOUS BLD VENIPUNCTURE: CPT | Mod: ZL | Performed by: INTERNAL MEDICINE

## 2021-09-22 PROCEDURE — 99213 OFFICE O/P EST LOW 20 MIN: CPT | Performed by: INTERNAL MEDICINE

## 2021-09-22 RX ORDER — GLIPIZIDE 5 MG/1
10 TABLET, FILM COATED, EXTENDED RELEASE ORAL 2 TIMES DAILY
Qty: 270 TABLET | Refills: 1 | COMMUNITY
Start: 2021-09-22 | End: 2021-09-22

## 2021-09-22 RX ORDER — GLIPIZIDE 10 MG/1
10 TABLET ORAL
Qty: 180 TABLET | Refills: 3 | Status: SHIPPED | OUTPATIENT
Start: 2021-09-22 | End: 2022-10-05

## 2021-09-22 ASSESSMENT — ENCOUNTER SYMPTOMS
EYES NEGATIVE: 1
CONSTITUTIONAL NEGATIVE: 1
ENDOCRINE NEGATIVE: 1
ALLERGIC/IMMUNOLOGIC NEGATIVE: 1

## 2021-09-22 ASSESSMENT — MIFFLIN-ST. JEOR: SCORE: 1571.59

## 2021-09-22 ASSESSMENT — PAIN SCALES - GENERAL: PAINLEVEL: NO PAIN (0)

## 2021-09-22 NOTE — NURSING NOTE
"Patient comes in for diabetes check.  Usha Ellsion LPN ....................9/22/2021   8:27 AM  Chief Complaint   Patient presents with     Diabetes       Initial /88 (BP Location: Right arm, Patient Position: Sitting, Cuff Size: Adult Large)   Pulse 71   Temp 97.3  F (36.3  C) (Tympanic)   Resp 18   Ht 1.63 m (5' 4.17\")   Wt 98.9 kg (218 lb)   SpO2 97%   BMI 37.22 kg/m   Estimated body mass index is 37.22 kg/m  as calculated from the following:    Height as of this encounter: 1.63 m (5' 4.17\").    Weight as of this encounter: 98.9 kg (218 lb).  Medication Reconciliation: complete    Usha Ellison LPN  FOOD SECURITY SCREENING QUESTIONS  Hunger Vital Signs:  Within the past 12 months we worried whether our food would run out before we got money to buy more. Never  Within the past 12 months the food we bought just didn't last and we didn't have money to get more. Never  Usha Ellison LPN 9/22/2021 8:27 AM    "

## 2021-09-22 NOTE — PROGRESS NOTES
Chief Complaint:  F/U on DM.    HPI: She is here today for follow-up on her diabetes.  She is on glipizide 20 mg daily as well as Victoza 1.8 mg daily.  She is intolerant of Metformin.  Her last A1c was high at 8 so we went from 10 mg of glipizide to 20 mg but even with that, she tells me that her blood sugars seem to be doing poorly.  No other complaints or concerns.    Past Medical History:   Diagnosis Date     Adenomatous colon polyp      Essential (primary) hypertension     11/6/2014     Gastro-esophageal reflux disease without esophagitis     No Comments Provided     Generalized anxiety disorder     No Comments Provided     Major depressive disorder, single episode     No Comments Provided     Type 2 diabetes mellitus without complications (H)     No Comments Provided       Past Surgical History:   Procedure Laterality Date     BREAST BIOPSY, RT/LT Left 02/2019     CHOLECYSTECTOMY      No Comments Provided     COLONOSCOPY  01/01/2010 2010,Normal, neg. for occult colitis, due 2020     COLONOSCOPY  10/12/2020    F/U 2025 tubular adenoma     DILATION AND CURETTAGE      2004,Also endometrial ablation     ESOPHAGOSCOPY, GASTROSCOPY, DUODENOSCOPY (EGD), COMBINED      2011,Reactive gastropathy, mild to moderate refulux changes.     HYSTERECTOMY TOTAL ABDOMINAL      2006,LAVH/LSO     LAMINECT/DISCECTOMY, CERVICAL  2008     LAPAROSCOPIC TUBAL LIGATION      No Comments Provided     LAPAROSCOPY DIAGNOSTIC (GYN)      2007,diagnostic laparoscopy With lysis of adhesions     RELEASE CARPAL TUNNEL Bilateral        Allergies   Allergen Reactions     Celecoxib Unknown     Metformin Diarrhea       Current Outpatient Medications   Medication Sig Dispense Refill     atorvastatin (LIPITOR) 20 MG tablet Take 1 tablet by mouth once daily 90 tablet 0     B-D U/F 31G X 8 MM insulin pen needle USE 1  ONCE DAILY WITH  VICTOZA 100 each 1     cholecalciferol (VITAMIN D3) 1000 UNIT tablet Take 1 tablet (1,000 Units) by mouth daily 100  tablet 3     citalopram (CELEXA) 20 MG tablet Take 1 tablet by mouth once daily 90 tablet 0     conjugated estrogens (PREMARIN) 0.625 MG/GM vaginal cream Place 1 g vaginally twice a week 30 g 3     CONTOUR NEXT TEST test strip USE TO CHECK GLUCOSE TWICE DAILY 200 strip 1     glipiZIDE (GLUCOTROL) 10 MG tablet Take 1 tablet (10 mg) by mouth 2 times daily (before meals) 180 tablet 3     ketoconazole (NIZORAL) 2 % external cream APPLY A THIN FILM OF CREAM TOPICALLY TWICE A DAY 15 g 3     liraglutide (VICTOZA PEN) 18 MG/3ML solution Inject 1.8 mg Subcutaneous daily 9 mL 11     lisinopril (ZESTRIL) 20 MG tablet Take 1 tablet by mouth once daily 90 tablet 0     Microlet Lancets MISC USE   TO CHECK GLUCOSE TWICE DAILY 200 each 0     omeprazole (PRILOSEC) 20 MG CR capsule TAKE ONE CAPSULE BY MOUTH ONCE DAILY BEFORE A MEAL         Review of Systems   Constitutional: Negative.    Eyes: Negative.    Endocrine: Negative.    Allergic/Immunologic: Negative.        Physical Exam  Vitals and nursing note reviewed.   Constitutional:       General: She is not in acute distress.     Appearance: Normal appearance. She is not ill-appearing, toxic-appearing or diaphoretic.   Neurological:      Mental Status: She is alert.         Assessment:        ICD-10-CM    1. Type 2 diabetes mellitus without complication, without long-term current use of insulin (H)  E11.9 Hemoglobin A1c     glipiZIDE (GLUCOTROL) 10 MG tablet     DISCONTINUED: glipiZIDE (GLUCOTROL XL) 5 MG 24 hr tablet       Plan: She was on the extended release glipizide so I changed her to regular glipizide 10 mg twice daily.  Continue Victoza.  A1c pending and I will message her the results.  If it is high I will start her on an SGLT2 inhibitor and give her recommendations as to when to follow-up.

## 2021-09-23 ENCOUNTER — LAB (OUTPATIENT)
Dept: LAB | Facility: OTHER | Age: 56
End: 2021-09-23
Attending: INTERNAL MEDICINE
Payer: COMMERCIAL

## 2021-09-23 DIAGNOSIS — E11.9 TYPE 2 DIABETES MELLITUS WITHOUT COMPLICATION, WITHOUT LONG-TERM CURRENT USE OF INSULIN (H): ICD-10-CM

## 2021-09-23 LAB — HBA1C MFR BLD: 7.7 % (ref 4–6.2)

## 2021-09-23 PROCEDURE — 83036 HEMOGLOBIN GLYCOSYLATED A1C: CPT | Mod: ZL

## 2021-09-23 PROCEDURE — 36415 COLL VENOUS BLD VENIPUNCTURE: CPT | Mod: ZL

## 2021-10-09 ENCOUNTER — HEALTH MAINTENANCE LETTER (OUTPATIENT)
Age: 56
End: 2021-10-09

## 2021-11-14 DIAGNOSIS — F34.1 DYSTHYMIC DISORDER: ICD-10-CM

## 2021-11-14 DIAGNOSIS — E78.5 HYPERLIPIDEMIA LDL GOAL <100: ICD-10-CM

## 2021-11-14 DIAGNOSIS — I10 ESSENTIAL HYPERTENSION: ICD-10-CM

## 2021-11-16 RX ORDER — ATORVASTATIN CALCIUM 20 MG/1
TABLET, FILM COATED ORAL
Qty: 90 TABLET | Refills: 0 | Status: SHIPPED | OUTPATIENT
Start: 2021-11-16 | End: 2022-03-09

## 2021-11-16 RX ORDER — CITALOPRAM HYDROBROMIDE 20 MG/1
TABLET ORAL
Qty: 90 TABLET | Refills: 0 | Status: SHIPPED | OUTPATIENT
Start: 2021-11-16 | End: 2022-03-09

## 2021-11-16 RX ORDER — LISINOPRIL 20 MG/1
TABLET ORAL
Qty: 90 TABLET | Refills: 0 | Status: SHIPPED | OUTPATIENT
Start: 2021-11-16 | End: 2022-03-09

## 2021-11-26 ENCOUNTER — TELEPHONE (OUTPATIENT)
Dept: INTERNAL MEDICINE | Facility: OTHER | Age: 56
End: 2021-11-26
Payer: COMMERCIAL

## 2021-11-26 NOTE — TELEPHONE ENCOUNTER
Patient Quality Outreach      Summary:    Patient has the following on her problem list/HM:     Diabetes    Last A1C:  Lab Results   Component Value Date    A1C 7.7 2021    A1C 8.0 2021    A1C 7.2 2021       Last LDL:    Lab Results   Component Value Date    LDL 70 2021       Is the patient on a Statin? Yes          Is the patient on Aspirin? No    Medications     HMG CoA Reductase Inhibitors     atorvastatin (LIPITOR) 20 MG tablet             Last three blood pressure readings:  BP Readings from Last 3 Encounters:   21 138/88   21 122/80   21 120/80          Tobacco Use      Smoking status: Former Smoker        Types: Cigarettes        Quit date: 2002        Years since quittin.7      Smokeless tobacco: Never Used          Patient is due/failing the following:   Diabetes -  Eye Exam    Type of outreach:    Sent [employer] message.    Questions for provider review:    None                                                                                                                                     Pallavi Lua LPN .............2021  4:15 PM     Chart routed to .

## 2022-01-25 ENCOUNTER — ALLIED HEALTH/NURSE VISIT (OUTPATIENT)
Dept: FAMILY MEDICINE | Facility: OTHER | Age: 57
End: 2022-01-25
Attending: INTERNAL MEDICINE
Payer: COMMERCIAL

## 2022-01-25 DIAGNOSIS — Z23 NEED FOR PROPHYLACTIC VACCINATION AND INOCULATION AGAINST INFLUENZA: Primary | ICD-10-CM

## 2022-01-25 PROCEDURE — 90682 RIV4 VACC RECOMBINANT DNA IM: CPT

## 2022-01-25 PROCEDURE — 90471 IMMUNIZATION ADMIN: CPT

## 2022-01-25 NOTE — NURSING NOTE
Immunization Documentation    Prior to Immunization administration, verified patients identity using patient's name and date of birth. Please see IMMUNIZATIONS  and order for additional information.  Patient / Parent instructed to remain in clinic for 15 minutes and report any adverse reaction to staff immediately.    Was entire vial of medication used? Yes  Vial/Syringe: Delma Hankins, [facility]  1/25/2022   4:44 PM

## 2022-02-17 DIAGNOSIS — E11.9 TYPE 2 DIABETES MELLITUS WITHOUT COMPLICATION, WITHOUT LONG-TERM CURRENT USE OF INSULIN (H): ICD-10-CM

## 2022-02-17 RX ORDER — LIRAGLUTIDE 6 MG/ML
1.8 INJECTION SUBCUTANEOUS DAILY
Qty: 9 ML | Refills: 0 | Status: SHIPPED | OUTPATIENT
Start: 2022-02-17 | End: 2022-03-09

## 2022-02-17 NOTE — TELEPHONE ENCOUNTER
[facility] sent Rx request for the following:   VICTOZA PEN 18 MG/3ML soln  Sig INJECT 1.8 MG SUBCUTANEOUS DAILY    Last Prescription Date:   11/22/2021  Last Fill Qty/Refills:         9 mL, R-11    Last Office Visit:              09/22/2021 ([facility])   Future Office visit:           None noted   GLP-1 Agonists Protocol Failed - 2/17/2022  6:48 AM        Failed - Normal serum creatinine on file in past 12 months     Recent Labs   Lab Test 01/22/21  0909   CR 0.86       Ok to refill medication if creatinine is low       Next A1C lab to be due 03/2022  Unable to complete prescription refill per RN Medication Refill Policy.................... Rozina Sheikh RN ....................  2/17/2022   9:47 AM

## 2022-03-02 ENCOUNTER — MYC MEDICAL ADVICE (OUTPATIENT)
Dept: INTERNAL MEDICINE | Facility: OTHER | Age: 57
End: 2022-03-02
Payer: COMMERCIAL

## 2022-03-09 ENCOUNTER — OFFICE VISIT (OUTPATIENT)
Dept: INTERNAL MEDICINE | Facility: OTHER | Age: 57
End: 2022-03-09
Attending: INTERNAL MEDICINE
Payer: COMMERCIAL

## 2022-03-09 VITALS
OXYGEN SATURATION: 98 % | HEART RATE: 72 BPM | SYSTOLIC BLOOD PRESSURE: 124 MMHG | BODY MASS INDEX: 36.88 KG/M2 | WEIGHT: 216 LBS | TEMPERATURE: 97 F | DIASTOLIC BLOOD PRESSURE: 82 MMHG | RESPIRATION RATE: 18 BRPM

## 2022-03-09 DIAGNOSIS — I10 ESSENTIAL HYPERTENSION: ICD-10-CM

## 2022-03-09 DIAGNOSIS — E78.5 HYPERLIPIDEMIA LDL GOAL <100: ICD-10-CM

## 2022-03-09 DIAGNOSIS — F34.1 DYSTHYMIC DISORDER: ICD-10-CM

## 2022-03-09 DIAGNOSIS — E11.9 TYPE 2 DIABETES MELLITUS WITHOUT COMPLICATION, WITHOUT LONG-TERM CURRENT USE OF INSULIN (H): Primary | ICD-10-CM

## 2022-03-09 DIAGNOSIS — I10 PRIMARY HYPERTENSION: ICD-10-CM

## 2022-03-09 LAB
ALBUMIN SERPL-MCNC: 4.6 G/DL (ref 3.5–5.7)
ALP SERPL-CCNC: 88 U/L (ref 34–104)
ALT SERPL W P-5'-P-CCNC: 42 U/L (ref 7–52)
ANION GAP SERPL CALCULATED.3IONS-SCNC: 7 MMOL/L (ref 3–14)
AST SERPL W P-5'-P-CCNC: 18 U/L (ref 13–39)
BILIRUB SERPL-MCNC: 0.7 MG/DL (ref 0.3–1)
BUN SERPL-MCNC: 18 MG/DL (ref 7–25)
CALCIUM SERPL-MCNC: 9.4 MG/DL (ref 8.6–10.3)
CHLORIDE BLD-SCNC: 103 MMOL/L (ref 98–107)
CHOLEST SERPL-MCNC: 138 MG/DL
CO2 SERPL-SCNC: 29 MMOL/L (ref 21–31)
CREAT SERPL-MCNC: 0.81 MG/DL (ref 0.6–1.2)
FASTING STATUS PATIENT QL REPORTED: NORMAL
GFR SERPL CREATININE-BSD FRML MDRD: 85 ML/MIN/1.73M2
GLUCOSE BLD-MCNC: 204 MG/DL (ref 70–105)
HBA1C MFR BLD: 8 % (ref 4–6.2)
HDLC SERPL-MCNC: 40 MG/DL (ref 23–92)
LDLC SERPL CALC-MCNC: 75 MG/DL
NONHDLC SERPL-MCNC: 98 MG/DL
POTASSIUM BLD-SCNC: 4.2 MMOL/L (ref 3.5–5.1)
PROT SERPL-MCNC: 7.1 G/DL (ref 6.4–8.9)
SODIUM SERPL-SCNC: 139 MMOL/L (ref 134–144)
TRIGL SERPL-MCNC: 114 MG/DL

## 2022-03-09 PROCEDURE — 99213 OFFICE O/P EST LOW 20 MIN: CPT | Performed by: INTERNAL MEDICINE

## 2022-03-09 PROCEDURE — 80053 COMPREHEN METABOLIC PANEL: CPT | Mod: ZL | Performed by: INTERNAL MEDICINE

## 2022-03-09 PROCEDURE — 83036 HEMOGLOBIN GLYCOSYLATED A1C: CPT | Mod: ZL | Performed by: INTERNAL MEDICINE

## 2022-03-09 PROCEDURE — 80061 LIPID PANEL: CPT | Mod: ZL | Performed by: INTERNAL MEDICINE

## 2022-03-09 PROCEDURE — 36415 COLL VENOUS BLD VENIPUNCTURE: CPT | Mod: ZL | Performed by: INTERNAL MEDICINE

## 2022-03-09 RX ORDER — LIRAGLUTIDE 6 MG/ML
1.8 INJECTION SUBCUTANEOUS DAILY
Qty: 9 ML | Refills: 3 | Status: SHIPPED | OUTPATIENT
Start: 2022-03-09 | End: 2022-07-25

## 2022-03-09 RX ORDER — LISINOPRIL 20 MG/1
20 TABLET ORAL DAILY
Qty: 90 TABLET | Refills: 3 | Status: SHIPPED | OUTPATIENT
Start: 2022-03-09 | End: 2023-03-24

## 2022-03-09 RX ORDER — CITALOPRAM HYDROBROMIDE 20 MG/1
20 TABLET ORAL DAILY
Qty: 90 TABLET | Refills: 3 | Status: SHIPPED | OUTPATIENT
Start: 2022-03-09 | End: 2023-03-24

## 2022-03-09 RX ORDER — ATORVASTATIN CALCIUM 20 MG/1
20 TABLET, FILM COATED ORAL DAILY
Qty: 90 TABLET | Refills: 3 | Status: SHIPPED | OUTPATIENT
Start: 2022-03-09 | End: 2023-03-24

## 2022-03-09 ASSESSMENT — ENCOUNTER SYMPTOMS
ALLERGIC/IMMUNOLOGIC NEGATIVE: 1
EYES NEGATIVE: 1
CONSTITUTIONAL NEGATIVE: 1
ENDOCRINE NEGATIVE: 1

## 2022-03-09 ASSESSMENT — PAIN SCALES - GENERAL: PAINLEVEL: NO PAIN (0)

## 2022-03-09 NOTE — NURSING NOTE
"Chief Complaint   Patient presents with     Diabetes       Initial /82 (BP Location: Right arm, Patient Position: Sitting, Cuff Size: Adult Regular)   Pulse 72   Temp 97  F (36.1  C) (Tympanic)   Resp 18   Wt 98 kg (216 lb)   SpO2 98%   BMI 36.88 kg/m   Estimated body mass index is 36.88 kg/m  as calculated from the following:    Height as of 9/22/21: 1.63 m (5' 4.17\").    Weight as of this encounter: 98 kg (216 lb).  Medication Reconciliation: complete    FOOD SECURITY SCREENING QUESTIONS:    The next two questions are to help us understand your food security.  If you are feeling you need any assistance in this area, we have resources available to support you today.    Hunger Vital Signs:  Within the past 12 months we worried whether our food would run out before we got money to buy more. Never  Within the past 12 months the food we bought just didn't last and we didn't have money to get more. Never      Perla Celis RN      "

## 2022-03-09 NOTE — LETTER
March 9, 2022      Jackie Donahue  [address] [address] [address]        Dear Liudmila,    Below are the results of your recent labs:    Results for orders placed or performed in visit on 03/09/22   Comprehensive metabolic panel     Status: Abnormal   Result Value Ref Range    Sodium 139 134 - 144 mmol/L    Potassium 4.2 3.5 - 5.1 mmol/L    Chloride 103 98 - 107 mmol/L    Carbon Dioxide (CO2) 29 21 - 31 mmol/L    Anion Gap 7 3 - 14 mmol/L    Urea Nitrogen 18 7 - 25 mg/dL    Creatinine 0.81 0.60 - 1.20 mg/dL    Calcium 9.4 8.6 - 10.3 mg/dL    Glucose 204 (H) 70 - 105 mg/dL    Alkaline Phosphatase 88 34 - 104 U/L    AST 18 13 - 39 U/L    ALT 42 7 - 52 U/L    Protein Total 7.1 6.4 - 8.9 g/dL    Albumin 4.6 3.5 - 5.7 g/dL    Bilirubin Total 0.7 0.3 - 1.0 mg/dL    GFR Estimate 85 >60 mL/min/1.73m2   Lipid Panel     Status: None   Result Value Ref Range    Cholesterol 138 <200 mg/dL    Triglycerides 114 <150 mg/dL    Direct Measure HDL 40 23 - 92 mg/dL    LDL Cholesterol Calculated 75 <=100 mg/dL    Non HDL Cholesterol 98 <130 mg/dL    Patient Fasting > 8hrs? Unknown     Narrative    Cholesterol  Desirable:  <200 mg/dL    Triglycerides  Normal:  Less than 150 mg/dL  Borderline High:  150-199 mg/dL  High:  200-499 mg/dL  Very High:  Greater than or equal to 500 mg/dL    Direct Measure HDL  Female:  Greater than or equal to 50 mg/dL   Male:  Greater than or equal to 40 mg/dL    LDL Cholesterol  Desirable:  <100mg/dL  Above Desirable:  100-129 mg/dL   Borderline High:  130-159 mg/dL   High:  160-189 mg/dL   Very High:  >= 190 mg/dL    Non HDL Cholesterol  Desirable:  130 mg/dL  Above Desirable:  130-159 mg/dL  Borderline High:  160-189 mg/dL  High:  190-219 mg/dL  Very High:  Greater than or equal to 220 mg/dL   Hemoglobin A1c     Status: Abnormal   Result Value Ref Range    Hemoglobin A1C 8.0 (H) 4.0 - 6.2 %        As expected, your diabetes is high.  I have sent a new prescription to your pharmacy that you will  take once daily.  After you have been on this for 3 months, I want you to return for a recheck on your A1c.  All the rest of your blood tests look great.    Sincerely,        Richard Sow MD  Internal Medicine  [facility]

## 2022-03-09 NOTE — PROGRESS NOTES
Chief Complaint: Follow-up diabetes.    HPI: She is here today for follow-up on her diabetes.  She does not tolerate Metformin.  She is on maximal dose glipizide and Victoza.  She admits that her blood sugars are probably running high just because she is not able to control her diet.  She tries to do some walking.  She has no other complaints or concerns.  She will be going to the eye doctor in 2 weeks.    Past Medical History:   Diagnosis Date     Adenomatous colon polyp      Essential (primary) hypertension     11/6/2014     Gastro-esophageal reflux disease without esophagitis     No Comments Provided     Generalized anxiety disorder     No Comments Provided     Major depressive disorder, single episode     No Comments Provided     Type 2 diabetes mellitus without complications (H)     No Comments Provided       Past Surgical History:   Procedure Laterality Date     BREAST BIOPSY, RT/LT Left 02/2019     CHOLECYSTECTOMY      No Comments Provided     COLONOSCOPY  01/01/2010 2010,Normal, neg. for occult colitis, due 2020     COLONOSCOPY  10/12/2020    F/U 2025 tubular adenoma     DILATION AND CURETTAGE      2004,Also endometrial ablation     ESOPHAGOSCOPY, GASTROSCOPY, DUODENOSCOPY (EGD), COMBINED      2011,Reactive gastropathy, mild to moderate refulux changes.     HYSTERECTOMY TOTAL ABDOMINAL      2006,LAVH/LSO     LAMINECT/DISCECTOMY, CERVICAL  2008     LAPAROSCOPIC TUBAL LIGATION      No Comments Provided     LAPAROSCOPY DIAGNOSTIC (GYN)      2007,diagnostic laparoscopy With lysis of adhesions     RELEASE CARPAL TUNNEL Bilateral        Allergies   Allergen Reactions     Celecoxib Unknown     Metformin Diarrhea       Current Outpatient Medications   Medication Sig Dispense Refill     atorvastatin (LIPITOR) 20 MG tablet Take 1 tablet (20 mg) by mouth daily 90 tablet 3     B-D U/F 31G X 8 MM insulin pen needle USE 1  ONCE DAILY WITH  VICTOZA 100 each 1     cholecalciferol (VITAMIN D3) 1000 UNIT tablet Take 1  tablet (1,000 Units) by mouth daily 100 tablet 3     citalopram (CELEXA) 20 MG tablet Take 1 tablet (20 mg) by mouth daily 90 tablet 3     conjugated estrogens (PREMARIN) 0.625 MG/GM vaginal cream Place 1 g vaginally twice a week 30 g 3     CONTOUR NEXT TEST test strip USE TO CHECK GLUCOSE TWICE DAILY 200 strip 1     glipiZIDE (GLUCOTROL) 10 MG tablet Take 1 tablet (10 mg) by mouth 2 times daily (before meals) 180 tablet 3     ketoconazole (NIZORAL) 2 % external cream APPLY A THIN FILM OF CREAM TOPICALLY TWICE A DAY 15 g 3     liraglutide (VICTOZA PEN) 18 MG/3ML solution Inject 1.8 mg Subcutaneous daily 9 mL 3     lisinopril (ZESTRIL) 20 MG tablet Take 1 tablet (20 mg) by mouth daily 90 tablet 3     Microlet Lancets MISC USE   TO CHECK GLUCOSE TWICE DAILY 200 each 0     omeprazole (PRILOSEC) 20 MG CR capsule TAKE ONE CAPSULE BY MOUTH ONCE DAILY BEFORE A MEAL         Review of Systems   Constitutional: Negative.    Eyes: Negative.    Endocrine: Negative.    Allergic/Immunologic: Negative.        Physical Exam  Vitals and nursing note reviewed.   Constitutional:       General: She is not in acute distress.     Appearance: Normal appearance. She is not ill-appearing, toxic-appearing or diaphoretic.   Neurological:      Mental Status: She is alert.         Assessment:        ICD-10-CM    1. Type 2 diabetes mellitus without complication, without long-term current use of insulin (H)  E11.9 Comprehensive metabolic panel     Lipid Panel     Hemoglobin A1c     liraglutide (VICTOZA PEN) 18 MG/3ML solution   2. Hyperlipidemia LDL goal <100  E78.5 atorvastatin (LIPITOR) 20 MG tablet   3. Primary hypertension  I10    4. Essential hypertension  I10 lisinopril (ZESTRIL) 20 MG tablet   5. Dysthymic disorder  F34.1 citalopram (CELEXA) 20 MG tablet       Plan: Medications are refilled as needed.  No changes are made today pending the results of her lab.  I will send her a letter with the results but if her A1c remains high we will  probably have to start her on an SGLT2 inhibitor such as Jardiance.  Follow-up will be dependent on her results.

## 2022-03-21 ENCOUNTER — TRANSFERRED RECORDS (OUTPATIENT)
Dept: HEALTH INFORMATION MANAGEMENT | Facility: OTHER | Age: 57
End: 2022-03-21
Payer: COMMERCIAL

## 2022-03-21 LAB — RETINOPATHY: NEGATIVE

## 2022-03-26 ENCOUNTER — HEALTH MAINTENANCE LETTER (OUTPATIENT)
Age: 57
End: 2022-03-26

## 2022-04-06 DIAGNOSIS — E11.9 TYPE 2 DIABETES MELLITUS WITHOUT COMPLICATION (H): ICD-10-CM

## 2022-04-06 DIAGNOSIS — E11.9 TYPE 2 DIABETES MELLITUS WITHOUT COMPLICATION, WITHOUT LONG-TERM CURRENT USE OF INSULIN (H): ICD-10-CM

## 2022-04-07 RX ORDER — LANCETS
EACH MISCELLANEOUS
Qty: 200 EACH | Refills: 3 | Status: SHIPPED | OUTPATIENT
Start: 2022-04-07

## 2022-04-07 RX ORDER — PEN NEEDLE, DIABETIC 31 GX5/16"
NEEDLE, DISPOSABLE MISCELLANEOUS
Qty: 100 EACH | Refills: 3 | Status: SHIPPED | OUTPATIENT
Start: 2022-04-07 | End: 2023-07-27

## 2022-04-12 ENCOUNTER — HOSPITAL ENCOUNTER (OUTPATIENT)
Dept: MAMMOGRAPHY | Facility: OTHER | Age: 57
Discharge: HOME | End: 2022-04-12
Attending: INTERNAL MEDICINE | Admitting: INTERNAL MEDICINE
Payer: COMMERCIAL

## 2022-04-12 DIAGNOSIS — Z12.31 VISIT FOR SCREENING MAMMOGRAM: ICD-10-CM

## 2022-04-12 PROCEDURE — 77067 SCR MAMMO BI INCL CAD: CPT

## 2022-06-13 ENCOUNTER — TELEPHONE (OUTPATIENT)
Dept: INTERNAL MEDICINE | Facility: OTHER | Age: 57
End: 2022-06-13
Payer: COMMERCIAL

## 2022-06-13 DIAGNOSIS — E11.9 TYPE 2 DIABETES MELLITUS WITHOUT COMPLICATION, WITHOUT LONG-TERM CURRENT USE OF INSULIN (H): ICD-10-CM

## 2022-06-13 NOTE — TELEPHONE ENCOUNTER
Last prescription:  empagliflozin (JARDIANCE) 10 MG TABS tablet 90 tablet 1 3/9/2022  --   Sig - Route: Take 1 tablet (10 mg) by mouth daily - Oral   Sent to pharmacy as: Empagliflozin 10 MG Oral Tablet (Jardiance)   Class: E-Prescribe   Order: [id number]   E-Prescribing Status: Receipt confirmed by pharmacy (3/9/2022  9:39 AM CST)     [facility] [address] [place] [address]     Called and spoke to Patient after verifying last name and date of birth. Pt states when she picked went to [facility] to  prescription, they told her that they were waiting on approval from provider. No recent requests noted in system.    Called [facility] and spoke with pharmacist, after verifying Pt's last name and . He states they are waiting for the drug to physically arrive at the pharmacy. He was not able to provide an expected date. He confirmed there is one refill on file that would be transferable to another pharmacy.    Left message on machine to call back. Callie Blanchard RN .............. 2022  10:03 AM

## 2022-06-13 NOTE — TELEPHONE ENCOUNTER
Patient returned call and she verified her name and . She was informed of the information below and verbalized plan to call local pharmacy to find out who has the medication and have the prescription transferred. Callie Blanchard RN .............. 2022  10:20 AM

## 2022-06-13 NOTE — TELEPHONE ENCOUNTER
Reason for call: Medication or medication refill    Name of medication requested: gardience    Are you out of the medication? yes    What pharmacy do you use? [facility]    Preferred method for responding to this message: Telephone Call    Phone number patient can be reached at: Cell number on file:    Telephone Information:   Mobile [phone]       If we cannot reach you directly, may we leave a detailed response at the number you provided? Yes

## 2022-07-24 DIAGNOSIS — E11.9 TYPE 2 DIABETES MELLITUS WITHOUT COMPLICATION, WITHOUT LONG-TERM CURRENT USE OF INSULIN (H): ICD-10-CM

## 2022-07-25 RX ORDER — LIRAGLUTIDE 6 MG/ML
INJECTION SUBCUTANEOUS
Qty: 9 ML | Refills: 3 | Status: SHIPPED | OUTPATIENT
Start: 2022-07-25 | End: 2022-11-28

## 2022-07-25 NOTE — TELEPHONE ENCOUNTER
[id number] [place] sent Rx request for the following:      Requested Prescriptions   Pending Prescriptions Disp Refills     VICTOZA PEN 18 MG/3ML soln [Pharmacy Med Name: Victoza 18 MG/3ML Subcutaneous Solution Pen-injector] 9 mL 0     Sig: INJECT 1.8 SUBCUTANEOUS DAILY   Last Prescription Date:   3/9/22  Last Fill Qty/Refills:         9 ml, R-3    Last Office Visit:              3/9/22   Future Office visit:           None    Callie Blanchard RN .............. 7/25/2022  3:28 PM

## 2022-08-29 ENCOUNTER — TELEPHONE (OUTPATIENT)
Dept: INTERNAL MEDICINE | Facility: OTHER | Age: 57
End: 2022-08-29

## 2022-08-29 NOTE — TELEPHONE ENCOUNTER
Patient was in [facility] (broken toe) on Saturday and she is suppose to see her primary in 3-4 days.  Can she be worked in? She wants to see KAYLYN Garcia on 8/29/2022 at 9:41 AM

## 2022-08-31 ENCOUNTER — OFFICE VISIT (OUTPATIENT)
Dept: INTERNAL MEDICINE | Facility: OTHER | Age: 57
End: 2022-08-31
Attending: INTERNAL MEDICINE
Payer: COMMERCIAL

## 2022-08-31 VITALS
TEMPERATURE: 96.7 F | SYSTOLIC BLOOD PRESSURE: 120 MMHG | BODY MASS INDEX: 35.48 KG/M2 | HEART RATE: 71 BPM | WEIGHT: 207.8 LBS | OXYGEN SATURATION: 98 % | RESPIRATION RATE: 16 BRPM | DIASTOLIC BLOOD PRESSURE: 76 MMHG

## 2022-08-31 DIAGNOSIS — S99.921D INJURY OF TOE ON RIGHT FOOT, SUBSEQUENT ENCOUNTER: Primary | ICD-10-CM

## 2022-08-31 PROCEDURE — 99213 OFFICE O/P EST LOW 20 MIN: CPT | Performed by: INTERNAL MEDICINE

## 2022-08-31 ASSESSMENT — ENCOUNTER SYMPTOMS
CONSTITUTIONAL NEGATIVE: 1
RESPIRATORY NEGATIVE: 1

## 2022-08-31 ASSESSMENT — PAIN SCALES - GENERAL: PAINLEVEL: MILD PAIN (2)

## 2022-08-31 NOTE — NURSING NOTE
"Chief Complaint   Patient presents with     Follow Up       Initial /76   Pulse 71   Temp (!) 96.7  F (35.9  C) (Temporal)   Resp 16   Wt 94.3 kg (207 lb 12.8 oz)   SpO2 98%   Breastfeeding No   BMI 35.48 kg/m   Estimated body mass index is 35.48 kg/m  as calculated from the following:    Height as of 9/22/21: 1.63 m (5' 4.17\").    Weight as of this encounter: 94.3 kg (207 lb 12.8 oz).  FOOD SECURITY SCREENING QUESTIONS  Hunger Vital Signs:  Within the past 12 months we worried whether our food would run out before we got money to buy more. Never  Within the past 12 months the food we bought just didn't last and we didn't have money to get more. Never        Morgan Gibbs, LPN    "

## 2022-08-31 NOTE — PROGRESS NOTES
ICD-10-CM    1. Injury of toe on right foot, subsequent encounter  S99.921D      The tuft fracture should heal without consequence.  The toenail may well fall off at some point in time and she is aware of that.  She will continue local cares to the area and we will follow-up depending on how this progresses.  She will complete her oral antibiotics.      Brandie Nur is a 56 year old, presenting for the following health issues:  Follow Up      She suffered a toe injury over the weekend.  A scooter fell and the handle landed on her right great toe.  She went to the emergency room and was found to have a subungual hematoma as well as a tuft fracture.  She is keeping this dressed on a daily basis and is taking Keflex and is here for a recheck.  Overall, she thinks that it is healing adequately.    History of Present Illness       Reason for visit:  Broken toe    She eats 0-1 servings of fruits and vegetables daily.She consumes 0 sweetened beverage(s) daily.She exercises with enough effort to increase her heart rate 9 or less minutes per day.  She exercises with enough effort to increase her heart rate 3 or less days per week.   She is taking medications regularly.         Current Outpatient Medications   Medication     atorvastatin (LIPITOR) 20 MG tablet     B-D U/F 31G X 8 MM insulin pen needle     cholecalciferol (VITAMIN D3) 1000 UNIT tablet     citalopram (CELEXA) 20 MG tablet     conjugated estrogens (PREMARIN) 0.625 MG/GM vaginal cream     CONTOUR NEXT TEST test strip     empagliflozin (JARDIANCE) 10 MG TABS tablet     glipiZIDE (GLUCOTROL) 10 MG tablet     ketoconazole (NIZORAL) 2 % external cream     lisinopril (ZESTRIL) 20 MG tablet     Microlet Lancets MISC     omeprazole (PRILOSEC) 20 MG CR capsule     VICTOZA PEN 18 MG/3ML soln     No current facility-administered medications for this visit.         Review of Systems   Constitutional: Negative.    Respiratory: Negative.             Objective    BP  120/76   Pulse 71   Temp (!) 96.7  F (35.9  C) (Temporal)   Resp 16   Wt 94.3 kg (207 lb 12.8 oz)   SpO2 98%   Breastfeeding No   BMI 35.48 kg/m    Body mass index is 35.48 kg/m .  Physical Exam  Vitals and nursing note reviewed.   Constitutional:       General: She is not in acute distress.     Appearance: Normal appearance. She is not ill-appearing, toxic-appearing or diaphoretic.   Musculoskeletal:      Comments: Right great toenail appeared to have some clotted blood beneath it with slight lifting of the nail.  Otherwise the toe really appeared to be fairly benign.   Neurological:      Mental Status: She is alert.                            .  ..

## 2022-09-05 DIAGNOSIS — E11.9 TYPE 2 DIABETES MELLITUS WITHOUT COMPLICATION, WITHOUT LONG-TERM CURRENT USE OF INSULIN (H): ICD-10-CM

## 2022-09-06 RX ORDER — EMPAGLIFLOZIN 10 MG/1
TABLET, FILM COATED ORAL
Qty: 60 TABLET | Refills: 0 | Status: SHIPPED | OUTPATIENT
Start: 2022-09-06 | End: 2022-11-03

## 2022-09-17 ENCOUNTER — HEALTH MAINTENANCE LETTER (OUTPATIENT)
Age: 57
End: 2022-09-17

## 2022-10-03 DIAGNOSIS — E11.9 TYPE 2 DIABETES MELLITUS WITHOUT COMPLICATION, WITHOUT LONG-TERM CURRENT USE OF INSULIN (H): ICD-10-CM

## 2022-10-05 RX ORDER — GLIPIZIDE 10 MG/1
TABLET ORAL
Qty: 180 TABLET | Refills: 0 | Status: SHIPPED | OUTPATIENT
Start: 2022-10-05 | End: 2022-12-28

## 2022-10-10 ENCOUNTER — TELEPHONE (OUTPATIENT)
Dept: LAB | Facility: OTHER | Age: 57
End: 2022-10-10

## 2022-10-11 ENCOUNTER — LAB (OUTPATIENT)
Dept: LAB | Facility: OTHER | Age: 57
End: 2022-10-11
Attending: INTERNAL MEDICINE
Payer: COMMERCIAL

## 2022-10-11 DIAGNOSIS — E11.9 TYPE 2 DIABETES MELLITUS WITHOUT COMPLICATION, WITHOUT LONG-TERM CURRENT USE OF INSULIN (H): Primary | ICD-10-CM

## 2022-10-11 DIAGNOSIS — E11.9 TYPE 2 DIABETES MELLITUS WITHOUT COMPLICATION, WITHOUT LONG-TERM CURRENT USE OF INSULIN (H): ICD-10-CM

## 2022-10-11 DIAGNOSIS — E11.9 TYPE 2 DIABETES MELLITUS WITHOUT COMPLICATION, UNSPECIFIED WHETHER LONG TERM INSULIN USE (H): ICD-10-CM

## 2022-10-11 LAB — HBA1C MFR BLD: 6.6 % (ref 4–6.2)

## 2022-10-11 PROCEDURE — 83036 HEMOGLOBIN GLYCOSYLATED A1C: CPT | Mod: ZL

## 2022-10-11 PROCEDURE — 36415 COLL VENOUS BLD VENIPUNCTURE: CPT | Mod: ZL

## 2022-11-01 DIAGNOSIS — E11.9 TYPE 2 DIABETES MELLITUS WITHOUT COMPLICATION, WITHOUT LONG-TERM CURRENT USE OF INSULIN (H): ICD-10-CM

## 2022-11-03 RX ORDER — EMPAGLIFLOZIN 10 MG/1
TABLET, FILM COATED ORAL
Qty: 60 TABLET | Refills: 0 | Status: SHIPPED | OUTPATIENT
Start: 2022-11-03 | End: 2022-12-28

## 2022-11-12 DIAGNOSIS — E11.9 TYPE 2 DIABETES MELLITUS WITHOUT COMPLICATION (H): ICD-10-CM

## 2022-11-24 DIAGNOSIS — E11.9 TYPE 2 DIABETES MELLITUS WITHOUT COMPLICATION, WITHOUT LONG-TERM CURRENT USE OF INSULIN (H): ICD-10-CM

## 2022-11-25 NOTE — TELEPHONE ENCOUNTER
[facility] sent Rx request for the following:      Victoza 18 MG/3ML Subcutaneous Solution Pen-injector      Last Prescription Date:   7/25/2022  Last Fill Qty/Refills:         9ml, R-3    Last Office Visit:              8/31/2022   Future Office visit:           none    Hakan Mcallister RN, BSN  ....................  11/25/2022   4:05 PM

## 2022-11-28 RX ORDER — LIRAGLUTIDE 6 MG/ML
INJECTION SUBCUTANEOUS
Qty: 9 ML | Refills: 0 | Status: SHIPPED | OUTPATIENT
Start: 2022-11-28 | End: 2022-12-28

## 2022-12-17 ENCOUNTER — MYC MEDICAL ADVICE (OUTPATIENT)
Dept: INTERNAL MEDICINE | Facility: OTHER | Age: 57
End: 2022-12-17

## 2022-12-19 NOTE — TELEPHONE ENCOUNTER
Have her make an appointment.  Prior to the appointment she should check to see if there are alternatives to Victoza that are covered.

## 2022-12-19 NOTE — TELEPHONE ENCOUNTER
Patient called and placed on schedule for 12/29/22 at 11am.    Zoë Paulson LPN....................  12/19/2022   4:58 PM

## 2022-12-26 DIAGNOSIS — E11.9 TYPE 2 DIABETES MELLITUS WITHOUT COMPLICATION, WITHOUT LONG-TERM CURRENT USE OF INSULIN (H): ICD-10-CM

## 2022-12-28 RX ORDER — GLIPIZIDE 10 MG/1
TABLET ORAL
Qty: 180 TABLET | Refills: 0 | Status: SHIPPED | OUTPATIENT
Start: 2022-12-28 | End: 2023-03-22

## 2022-12-28 RX ORDER — EMPAGLIFLOZIN 10 MG/1
TABLET, FILM COATED ORAL
Qty: 60 TABLET | Refills: 0 | Status: SHIPPED | OUTPATIENT
Start: 2022-12-28 | End: 2023-03-06

## 2022-12-28 RX ORDER — LIRAGLUTIDE 6 MG/ML
INJECTION SUBCUTANEOUS
Qty: 9 ML | Refills: 0 | Status: SHIPPED | OUTPATIENT
Start: 2022-12-28 | End: 2023-02-13

## 2022-12-28 NOTE — TELEPHONE ENCOUNTER
"[id number]  sent Rx request for the following:      Requested Prescriptions   Pending Prescriptions Disp Refills     VICTOZA PEN 18 MG/3ML soln [Pharmacy Med Name: Victoza 18 MG/3ML Subcutaneous Solution Pen-injector] 9 mL 0     Sig: INJECT 1.8 MG SUBCUTANEOUSLY ONCE DAILY.       GLP-1 Agonists Protocol Passed - 12/27/2022  3:52 PM        Passed - HgbA1C in past 3 or 6 months     If HgbA1C is 8 or greater, it needs to be on file within the past 3 months.  If less than 8, must be on file within the past 6 months.     Recent Labs   Lab Test 10/11/22  0800 09/25/18  1100 01/18/18  0918   A1C 6.6*   < >  --    SOYP300  --   --  5.9    < > = values in this interval not displayed.           Passed - Medication is active on med list        Passed - Patient is age 18 or older        Passed - No active pregnancy on record        Passed - Normal serum creatinine on file in past 12 months     Recent Labs   Lab Test 03/09/22  0837   CR 0.81     Ok to refill medication if creatinine is low        Passed - No positive pregnancy test in past 12 months        Passed - Recent (6 mo) or future (30 days) visit within the authorizing provider's specialty     Patient had office visit in the last 6 months or has a visit in the next 30 days with authorizing provider.  See \"Patient Info\" tab in inbasket, or \"Choose Columns\" in Meds & Orders section of the refill encounter.      Last Prescription Date:   11/28/22  Last Fill Qty/Refills:         9 mL, R-0          glipiZIDE (GLUCOTROL) 10 MG tablet [Pharmacy Med Name: glipiZIDE 10 MG Oral Tablet] 180 tablet 0     Sig: TAKE 1 TABLET BY MOUTH TWICE DAILY BEFORE MEAL(S)       Sulfonylurea Agents Passed - 12/27/2022  3:52 PM        Passed - Patient has documented A1c within the specified period of time.     If HgbA1C is 8 or greater, it needs to be on file within the past 3 months.  If less than 8, must be on file within the past 6 months.     Recent Labs   Lab Test 10/11/22  0800 " "09/25/18  1100 01/18/18 0918   A1C 6.6*   < >  --    [id number]  --   --  5.9    < > = values in this interval not displayed.           Passed - Medication is active on med list        Passed - Patient is age 18 or older        Passed - No active pregnancy on record        Passed - Patient has a recent creatinine (normal) within the past 12 mos.     Recent Labs   Lab Test 03/09/22  0837   CR 0.81     Ok to refill medication if creatinine is low        Passed - Patient has not had a positive pregnancy test within the past 12 mos.        Passed - Recent (6 mo) or future (30 days) visit within the authorizing provider's specialty     Patient had office visit in the last 6 months or has a visit in the next 30 days with authorizing provider or within the authorizing provider's specialty.  See \"Patient Info\" tab in inbasket, or \"Choose Columns\" in Meds & Orders section of the refill encounter.         Last Prescription Date:   10/05/22  Last Fill Qty/Refills:         180, R-0          JARDIANCE 10 MG TABS tablet [Pharmacy Med Name: Jardiance 10 MG Oral Tablet] 60 tablet 0     Sig: Take 1 tablet by mouth once daily       Sodium Glucose Co-Transport Inhibitor Agents Passed - 12/27/2022  3:51 PM        Passed - Patient has documented A1c within the specified period of time.     If HgbA1C is 8 or greater, it needs to be on file within the past 3 months.  If less than 8, must be on file within the past 6 months.     Recent Labs   Lab Test 10/11/22  0800 09/25/18  1100 01/18/18 0918   A1C 6.6*   < >  --    [id number]  --   --  5.9    < > = values in this interval not displayed.           Passed - No creatinine >1.4 or GFR <45 within the past 12 mos     Recent Labs   Lab Test 03/09/22  0837 01/22/21  0909   GFRESTIMATED 85 69   GFRESTBLACK  --  83     Recent Labs   Lab Test 03/09/22  0837   CR 0.81           Passed - Medication is active on med list        Passed - Patient is age 18 or older        Passed - Patient is not pregnant " "       Passed - Patient has documented normal Potassium within the last 12 mos.     Recent Labs   Lab Test 03/09/22  0837   POTASSIUM 4.2           Passed - Patient has no positive pregnancy test within the past 12 mos.        Passed - Recent (6 mo) or future (30 days) visit within the authorizing provider's specialty     Patient had office visit in the last 6 months or has a visit in the next 30 days with authorizing provider or within the authorizing provider's specialty.  See \"Patient Info\" tab in inbasket, or \"Choose Columns\" in Meds & Orders section of the refill encounter.       Last Prescription Date:   11/03/22  Last Fill Qty/Refills:         60, R-0  Last Office Visit:              08/31/22   Future Office visit:             Next 5 appointments (look out 90 days)    Dec 29, 2022 11:00 AM  SHORT with Richard Sow MD  [facility] ([facility] ) [address]  [place] [address]  [phone]        Ana López RN on 12/28/2022 at 9:35 AM      "

## 2022-12-29 ENCOUNTER — OFFICE VISIT (OUTPATIENT)
Dept: INTERNAL MEDICINE | Facility: OTHER | Age: 57
End: 2022-12-29
Attending: INTERNAL MEDICINE
Payer: COMMERCIAL

## 2022-12-29 VITALS
WEIGHT: 207.2 LBS | SYSTOLIC BLOOD PRESSURE: 132 MMHG | BODY MASS INDEX: 35.37 KG/M2 | OXYGEN SATURATION: 96 % | HEART RATE: 70 BPM | DIASTOLIC BLOOD PRESSURE: 80 MMHG | HEIGHT: 64 IN | RESPIRATION RATE: 18 BRPM | TEMPERATURE: 97.4 F

## 2022-12-29 DIAGNOSIS — E11.9 TYPE 2 DIABETES MELLITUS WITHOUT COMPLICATION, WITHOUT LONG-TERM CURRENT USE OF INSULIN (H): Primary | ICD-10-CM

## 2022-12-29 PROCEDURE — 99213 OFFICE O/P EST LOW 20 MIN: CPT | Performed by: INTERNAL MEDICINE

## 2022-12-29 ASSESSMENT — ENCOUNTER SYMPTOMS
RESPIRATORY NEGATIVE: 1
CARDIOVASCULAR NEGATIVE: 1
CONSTITUTIONAL NEGATIVE: 1

## 2022-12-29 ASSESSMENT — PAIN SCALES - GENERAL: PAINLEVEL: NO PAIN (0)

## 2022-12-29 NOTE — NURSING NOTE
"Chief Complaint   Patient presents with     Recheck Medication     Medication review and refill       Initial /80 (BP Location: Right arm, Patient Position: Sitting, Cuff Size: Adult Large)   Pulse 70   Temp 97.4  F (36.3  C) (Temporal)   Resp 18   Ht 1.626 m (5' 4\")   Wt 94 kg (207 lb 3.2 oz)   SpO2 96%   BMI 35.57 kg/m   Estimated body mass index is 35.57 kg/m  as calculated from the following:    Height as of this encounter: 1.626 m (5' 4\").    Weight as of this encounter: 94 kg (207 lb 3.2 oz).      Medication Reconciliation: complete    FOOD SECURITY SCREENING QUESTIONS:      The next two questions are to help us understand your food security.  If you are feeling you need any assistance in this area, we have resources available to support you today.    Hunger Vital Signs:  Within the past 12 months we worried whether our food would run out before we got money to buy more. Never  Within the past 12 months the food we bought just didn't last and we didn't have money to get more. Never    Zoë Paulson LPN....................  12/29/2022   11:09 AM      " This note will not be viewable in [address] Oncology Navigator  Psychosocial Assessment    Reason for Assessment:    []Depression  []Anxiety  []Caregiver Ellis  []Maladaptive Coping with Serious Illness   [] Social Work Referral [x] Initial Assessment  [] Other     Sources of Information:    [x]Patient  [x]Family  []Staff  []Medical Record    Advance Care Planning:  Advance Care Planning 12/23/2019   Patient's Healthcare Decision Maker is: -   Confirm Advance Directive Yes, on file   Does the patient have other document types -   Patient does not have an advanced medical directive on file, and did not express interest in completing one today.     Mental Status:    [x]Alert  []Lethargic  []Unresponsive   [] Unable to assess   Oriented to:  [x]Person  [x]Place  [x]Time  [x]Situation      Barriers to Learning:    []Language  []Developmental  []Cognitive  []Altered Mental Status  []Visual/Hearing Impairment  []Unable to Read/Write  []Motivational   [x]No Barriers Identified  []Other:    Relationship Status:  []Single  [x]  []Significant Other/Life Partner  []  []  []      Living Circumstances:  []Lives Alone  []Family/Significant Other in Household  []Roommates  []Children in the Home  []Paid Caregivers  [x]Assisted Living Facility/Group Home  []Skilled [address]  []Homeless  []Incarcerated  []Environmental/Care Concerns  []other:    Employment Status:  []Employed Full-time []Employed Part-time []Homemaker [] Disabled  [x] Retired []Other:    Support System:    [x]Strong  []Fair  []Limited    Financial/Legal Concerns:    []Uninsured  []Limited Income/Resources  []Non-Citizen  [x]No Concerns Identified  []Financial POA:    []Other:    Oriental orthodox/Spiritual/Existential:  []Strong Sense of Spirituality  []Involved in [facility]  []Request  Visit  []Expressing [place]  [x]No Concerns Identified    Coping with Illness:         Patient: Family/Caregiver: Understanding and Acceptance of Illness/Prognosis  [x] [x]   Strong Sense of Resilience [x] [x]   Self Reflection [x] [x]   Engaged Support System [x] [x]   Does not Readily Discuss Illness [] []   Denial of Terminal Status [] []   Anger [] []   Depression [] []   Anxiety/Fear [] []   Bargaining [] []   Recent Diagnosis/Prognosis [] []   Difficulties with Body Image [] []   Loss of Identity [] []   Excessive Substance Use [] []   Mental Health History [] []   Enmeshed Relationships [] []   History of Loss [] []   Anticipatory Grief [] []   Concern for Complicated Grief [] []   Suicidal Ideation or Plan [] []   Unable to assess [] []            Narrative:  Met with the patient, his wife Brenda Trimble), and daughters (Julia and Dawson Duke). 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 The patient lives in assisted living at [facility]. His wife lives in healthcare at [facility]. The patient uses a rollator to ambulate. He has frequent bleeding as a result of taking Eliquis per the patient. He is getting Skilled Nursing for Pleurex Drainage through [facility] [place] currently. He also receives wound care from the staff at [facility]. The patient is retired from a career in sales. His daughters live locally and are supportive, but both work. Suggested that they consider enrolling in FMLA through their employers; they are familiar with the [address] [place] [address]. Provided this 's contact information as well as information regarding the complementary services provided by the [facility]. Assessment/Action:   1. Introduced self and role of this  in the [employer]. 2.  Informed the patient of the [facility] and available resources there. 3.  Continue to meet with the patient when he returns to the clinic for ongoing assessment of the patients adjustment to his diagnosis and treatment.     4.  Ongoing psychosocial support as desired by patient.       Plan/Referral:    Complementary therapies referral  Calvin Aceves LCSW

## 2022-12-29 NOTE — PROGRESS NOTES
ICD-10-CM    1. Type 2 diabetes mellitus without complication, without long-term current use of insulin (H)  E11.9           She appears to be doing fairly well and certainly much better with her diabetes.  I want her medications to continue without change although we may have to change her GLP-1 formulation depending on her insurance coverage.  She will let me know.  Yearly review in March with yearly lab work.    Brandie Nur is a 57 year old, presenting for the following health issues:  Recheck Medication (Medication review and refill)      She is here for follow-up.  She had her A1c done about 2 months ago and it was excellent.  She admits that she was able to work very hard on healthy diet and weight loss with exercise.  She is on numerous drugs for her diabetes at present.  As it turns out, at the end of the year her Jardiance will no longer be covered.  I talked to her about this today and suggested that she find out which different GLP-1 agonist would be covered in its place.  Other than that she feels well and has no major complaints or concerns.    History of Present Illness       Diabetes:   She presents for follow up of diabetes.  She is checking home blood glucose one time daily. She checks blood glucose before meals.  Blood glucose is sometimes over 200 and never under 70. She is aware of hypoglycemia symptoms including shakiness. She is concerned about other. She is not experiencing numbness or burning in feet, excessive thirst, blurry vision, weight changes or redness, sores or blisters on feet.         She eats 2-3 servings of fruits and vegetables daily.She consumes 1 sweetened beverage(s) daily.She exercises with enough effort to increase her heart rate 9 or less minutes per day.    She is taking medications regularly.         Current Outpatient Medications   Medication     atorvastatin (LIPITOR) 20 MG tablet     B-D U/F 31G X 8 MM insulin pen needle     cholecalciferol (VITAMIN D3) 1000  "UNIT tablet     citalopram (CELEXA) 20 MG tablet     conjugated estrogens (PREMARIN) 0.625 MG/GM vaginal cream     CONTOUR NEXT TEST test strip     glipiZIDE (GLUCOTROL) 10 MG tablet     JARDIANCE 10 MG TABS tablet     ketoconazole (NIZORAL) 2 % external cream     lisinopril (ZESTRIL) 20 MG tablet     Microlet Lancets MISC     omeprazole (PRILOSEC) 20 MG CR capsule     VICTOZA PEN 18 MG/3ML soln     No current facility-administered medications for this visit.         Review of Systems   Constitutional: Negative.    Respiratory: Negative.    Cardiovascular: Negative.             Objective    /80 (BP Location: Right arm, Patient Position: Sitting, Cuff Size: Adult Large)   Pulse 70   Temp 97.4  F (36.3  C) (Temporal)   Resp 18   Ht 1.626 m (5' 4\")   Wt 94 kg (207 lb 3.2 oz)   SpO2 96%   BMI 35.57 kg/m    Body mass index is 35.57 kg/m .  Physical Exam  Vitals and nursing note reviewed.   Constitutional:       General: She is not in acute distress.     Appearance: Normal appearance. She is not ill-appearing, toxic-appearing or diaphoretic.   Neurological:      Mental Status: She is alert.                            "

## 2023-02-01 ENCOUNTER — TRANSFERRED RECORDS (OUTPATIENT)
Dept: MULTI SPECIALTY CLINIC | Facility: CLINIC | Age: 58
End: 2023-02-01

## 2023-02-01 LAB — RETINOPATHY: NORMAL

## 2023-02-15 ENCOUNTER — OFFICE VISIT (OUTPATIENT)
Dept: INTERNAL MEDICINE | Facility: OTHER | Age: 58
End: 2023-02-15
Attending: INTERNAL MEDICINE
Payer: COMMERCIAL

## 2023-02-15 VITALS
DIASTOLIC BLOOD PRESSURE: 80 MMHG | HEART RATE: 80 BPM | WEIGHT: 210 LBS | OXYGEN SATURATION: 97 % | SYSTOLIC BLOOD PRESSURE: 118 MMHG | RESPIRATION RATE: 18 BRPM | BODY MASS INDEX: 36.05 KG/M2 | TEMPERATURE: 96.7 F

## 2023-02-15 DIAGNOSIS — E11.9 TYPE 2 DIABETES MELLITUS WITHOUT COMPLICATION, WITHOUT LONG-TERM CURRENT USE OF INSULIN (H): ICD-10-CM

## 2023-02-15 DIAGNOSIS — R10.13 EPIGASTRIC PAIN: Primary | ICD-10-CM

## 2023-02-15 LAB
ALBUMIN SERPL BCG-MCNC: 4.2 G/DL (ref 3.5–5.2)
ALP SERPL-CCNC: 90 U/L (ref 35–104)
ALT SERPL W P-5'-P-CCNC: 60 U/L (ref 10–35)
AMYLASE SERPL-CCNC: 49 U/L (ref 28–100)
ANION GAP SERPL CALCULATED.3IONS-SCNC: 8 MMOL/L (ref 7–15)
AST SERPL W P-5'-P-CCNC: 28 U/L (ref 10–35)
BILIRUB SERPL-MCNC: 0.5 MG/DL
BUN SERPL-MCNC: 21 MG/DL (ref 6–20)
CALCIUM SERPL-MCNC: 9.4 MG/DL (ref 8.6–10)
CHLORIDE SERPL-SCNC: 105 MMOL/L (ref 98–107)
CHOLEST SERPL-MCNC: 127 MG/DL
CREAT SERPL-MCNC: 0.75 MG/DL (ref 0.51–0.95)
DEPRECATED HCO3 PLAS-SCNC: 28 MMOL/L (ref 22–29)
ERYTHROCYTE [DISTWIDTH] IN BLOOD BY AUTOMATED COUNT: 12.8 % (ref 10–15)
GFR SERPL CREATININE-BSD FRML MDRD: >90 ML/MIN/1.73M2
GLUCOSE SERPL-MCNC: 234 MG/DL (ref 70–99)
HBA1C MFR BLD: 7.4 % (ref 4–6.2)
HCT VFR BLD AUTO: 36.6 % (ref 35–47)
HDLC SERPL-MCNC: 43 MG/DL
HGB BLD-MCNC: 12.4 G/DL (ref 11.7–15.7)
HOLD SPECIMEN: NORMAL
LDLC SERPL CALC-MCNC: 64 MG/DL
LIPASE SERPL-CCNC: 54 U/L (ref 13–60)
MCH RBC QN AUTO: 30.2 PG (ref 26.5–33)
MCHC RBC AUTO-ENTMCNC: 33.9 G/DL (ref 31.5–36.5)
MCV RBC AUTO: 89 FL (ref 78–100)
NONHDLC SERPL-MCNC: 84 MG/DL
PLATELET # BLD AUTO: 238 10E3/UL (ref 150–450)
POTASSIUM SERPL-SCNC: 4.2 MMOL/L (ref 3.4–5.3)
PROT SERPL-MCNC: 7.1 G/DL (ref 6.4–8.3)
RBC # BLD AUTO: 4.1 10E6/UL (ref 3.8–5.2)
SODIUM SERPL-SCNC: 141 MMOL/L (ref 136–145)
TRIGL SERPL-MCNC: 98 MG/DL
WBC # BLD AUTO: 8.1 10E3/UL (ref 4–11)

## 2023-02-15 PROCEDURE — 80061 LIPID PANEL: CPT | Mod: ZL | Performed by: INTERNAL MEDICINE

## 2023-02-15 PROCEDURE — 82150 ASSAY OF AMYLASE: CPT | Mod: ZL | Performed by: INTERNAL MEDICINE

## 2023-02-15 PROCEDURE — 83036 HEMOGLOBIN GLYCOSYLATED A1C: CPT | Mod: ZL | Performed by: INTERNAL MEDICINE

## 2023-02-15 PROCEDURE — 80053 COMPREHEN METABOLIC PANEL: CPT | Mod: ZL | Performed by: INTERNAL MEDICINE

## 2023-02-15 PROCEDURE — 85027 COMPLETE CBC AUTOMATED: CPT | Mod: ZL | Performed by: INTERNAL MEDICINE

## 2023-02-15 PROCEDURE — 99213 OFFICE O/P EST LOW 20 MIN: CPT | Performed by: INTERNAL MEDICINE

## 2023-02-15 PROCEDURE — 36415 COLL VENOUS BLD VENIPUNCTURE: CPT | Mod: ZL | Performed by: INTERNAL MEDICINE

## 2023-02-15 PROCEDURE — 83690 ASSAY OF LIPASE: CPT | Mod: ZL | Performed by: INTERNAL MEDICINE

## 2023-02-15 RX ORDER — SUCRALFATE 1 G/1
1 TABLET ORAL 4 TIMES DAILY
Qty: 120 TABLET | Refills: 3 | Status: SHIPPED | OUTPATIENT
Start: 2023-02-15 | End: 2023-07-27

## 2023-02-15 ASSESSMENT — ENCOUNTER SYMPTOMS
CONSTITUTIONAL NEGATIVE: 1
CARDIOVASCULAR NEGATIVE: 1
RESPIRATORY NEGATIVE: 1

## 2023-02-15 ASSESSMENT — PATIENT HEALTH QUESTIONNAIRE - PHQ9: SUM OF ALL RESPONSES TO PHQ QUESTIONS 1-9: 0

## 2023-02-15 ASSESSMENT — PAIN SCALES - GENERAL: PAINLEVEL: MODERATE PAIN (4)

## 2023-02-15 NOTE — PROGRESS NOTES
ICD-10-CM    1. Epigastric pain  R10.13 Comprehensive metabolic panel     Amylase     Lipase     CBC W PLT No Diff     Adult GI  Referral - Procedure Only     sucralfate (CARAFATE) 1 GM tablet      2. Type 2 diabetes mellitus without complication, without long-term current use of insulin (H)  E11.9 Hemoglobin A1c     Lipid Panel        This is fairly uncomfortable and bothersome for her so I do think that further full investigation is necessary.  I elected to have her stay on the omeprazole but I also added Carafate as a trial.  Complete lab pending, I will message her the results.  She will be scheduled for an EGD for further evaluation.      Brandie Nur is a 57 year old, presenting for the following health issues:  Abdominal Pain      She is in today complaining of abdominal pain.  This has been for the last couple of weeks.  The pain is in the epigastric region and she also has a sensation in the back of her throat.  She does get reflux symptoms quite frequently.  She is on omeprazole daily.  Her last EGD was in 2011 which showed moderate reflux changes.  She admits that eating food does seem to help with the discomfort.  She has not tried anything else.  She does not drink excessive alcohol or take excessive anti-inflammatories.  No difficulty swallowing.         Current Outpatient Medications   Medication     atorvastatin (LIPITOR) 20 MG tablet     B-D U/F 31G X 8 MM insulin pen needle     cholecalciferol (VITAMIN D3) 1000 UNIT tablet     citalopram (CELEXA) 20 MG tablet     conjugated estrogens (PREMARIN) 0.625 MG/GM vaginal cream     CONTOUR NEXT TEST test strip     dulaglutide (TRULICITY) 1.5 MG/0.5ML pen     glipiZIDE (GLUCOTROL) 10 MG tablet     JARDIANCE 10 MG TABS tablet     ketoconazole (NIZORAL) 2 % external cream     lisinopril (ZESTRIL) 20 MG tablet     Microlet Lancets MISC     omeprazole (PRILOSEC) 20 MG CR capsule     sucralfate (CARAFATE) 1 GM tablet     No current  facility-administered medications for this visit.         Review of Systems   Constitutional: Negative.    Respiratory: Negative.    Cardiovascular: Negative.             Objective    /80   Pulse 80   Temp (!) 96.7  F (35.9  C) (Temporal)   Resp 18   Wt 95.3 kg (210 lb)   SpO2 97%   BMI 36.05 kg/m    Body mass index is 36.05 kg/m .  Physical Exam  Vitals and nursing note reviewed.   Constitutional:       General: She is not in acute distress.     Appearance: Normal appearance. She is not ill-appearing, toxic-appearing or diaphoretic.   Abdominal:      General: Bowel sounds are normal.      Palpations: Abdomen is soft. There is no mass.      Tenderness: There is abdominal tenderness. There is no guarding.      Comments: Tender in the epigastric area   Neurological:      Mental Status: She is alert.

## 2023-02-22 ENCOUNTER — MYC MEDICAL ADVICE (OUTPATIENT)
Dept: INTERNAL MEDICINE | Facility: OTHER | Age: 58
End: 2023-02-22
Payer: COMMERCIAL

## 2023-02-22 DIAGNOSIS — R10.13 EPIGASTRIC PAIN: Primary | ICD-10-CM

## 2023-03-02 ENCOUNTER — PREP FOR PROCEDURE (OUTPATIENT)
Dept: SURGERY | Facility: OTHER | Age: 58
End: 2023-03-02

## 2023-03-02 ENCOUNTER — OFFICE VISIT (OUTPATIENT)
Dept: SURGERY | Facility: OTHER | Age: 58
End: 2023-03-02
Attending: SURGERY
Payer: COMMERCIAL

## 2023-03-02 VITALS
HEIGHT: 64 IN | WEIGHT: 210 LBS | DIASTOLIC BLOOD PRESSURE: 80 MMHG | TEMPERATURE: 98.3 F | HEART RATE: 76 BPM | OXYGEN SATURATION: 96 % | SYSTOLIC BLOOD PRESSURE: 128 MMHG | BODY MASS INDEX: 35.85 KG/M2

## 2023-03-02 DIAGNOSIS — R10.13 EPIGASTRIC PAIN: ICD-10-CM

## 2023-03-02 DIAGNOSIS — R10.13 EPIGASTRIC PAIN: Primary | ICD-10-CM

## 2023-03-02 PROCEDURE — 99203 OFFICE O/P NEW LOW 30 MIN: CPT | Performed by: SURGERY

## 2023-03-02 ASSESSMENT — PAIN SCALES - GENERAL: PAINLEVEL: MODERATE PAIN (4)

## 2023-03-02 NOTE — PATIENT INSTRUCTIONS
Thank you for allowing Dr. Oshea and our surgical team to participate in your care. Please call our health unit coordinator at [phone] with scheduling questions or the nurse at [phone] with any other questions or concerns.    You have been scheduled for: Upper Endoscopy possible biopsy with  on  03/21/2023.     Please see handout for additional instruction.  You will not need a pre-operative appointment with your primary care provider.  You may call [phone] or [phone] with any questions.

## 2023-03-03 ENCOUNTER — TELEPHONE (OUTPATIENT)
Dept: SURGERY | Facility: OTHER | Age: 58
End: 2023-03-03

## 2023-03-03 NOTE — TELEPHONE ENCOUNTER
I got a call from Lakisha MCGUIRE in surgery, she said that the 03/21/2023 surgery date is full for Dr. Oshea.  I called pt and offered 04/11/2023, pt declined and said that she could get in to [facility] sooner.

## 2023-03-03 NOTE — TELEPHONE ENCOUNTER
Screening Questions for the Scheduling of Screening Colonoscopies   (If Colonoscopy is diagnostic, Provider should review the chart before scheduling.)  Are you younger than 50 or older than 80?  NO  Do you take aspirin or fish oil?  NO (if yes, tell patient to stop 1 week prior to Colonoscopy)  Do you take warfarin (Coumadin), clopidogrel (Plavix), apixaban (Eliquis), dabigatram (Pradaxa), rivaroxaban (Xarelto) or any blood thinner? NO  Do you use oxygen at home?  NO  Do you have kidney disease? NO  Are you on dialysis? NO  Have you had a stroke or heart attack in the last year? NO  Have you had a stent in your heart or any blood vessel in the last year? NO  Have you had a transplant of any organ? NO  Have you had a colonoscopy or upper endoscopy (EGD) before? YES         When?  2010  Date of scheduled EGD. 03/29/2023  Provider KENNEDY  Pharmacy [facility]  Patient was scheduled in [place], but they pushed her out till mid April.  Patient is now going to be seen here at [facility].

## 2023-03-06 ENCOUNTER — MYC MEDICAL ADVICE (OUTPATIENT)
Dept: INTERNAL MEDICINE | Facility: OTHER | Age: 58
End: 2023-03-06
Payer: COMMERCIAL

## 2023-03-06 DIAGNOSIS — E11.9 TYPE 2 DIABETES MELLITUS WITHOUT COMPLICATION, WITHOUT LONG-TERM CURRENT USE OF INSULIN (H): ICD-10-CM

## 2023-03-07 ENCOUNTER — HOSPITAL ENCOUNTER (OUTPATIENT)
Dept: GENERAL RADIOLOGY | Facility: OTHER | Age: 58
Discharge: HOME | End: 2023-03-07
Attending: SURGERY | Admitting: SURGERY
Payer: COMMERCIAL

## 2023-03-07 DIAGNOSIS — R10.13 EPIGASTRIC PAIN: ICD-10-CM

## 2023-03-07 PROCEDURE — 255N000001 HC RX 255: Performed by: SURGERY

## 2023-03-07 PROCEDURE — 74221 X-RAY XM ESOPHAGUS 2CNTRST: CPT

## 2023-03-07 RX ADMIN — ANTACID/ANTIFLATULENT 4 G: 380; 550; 10; 10 GRANULE, EFFERVESCENT ORAL at 11:55

## 2023-03-08 NOTE — PROGRESS NOTES
[facility] Surgery Consultation    CC:  Epigastric pain,     HPI:  This 57 year old year old female is seen at the request of Dr. Sow for evaluation of epigastric pain. She has had this ongoing for several years seems worse over the last 6 weeks or so. She is on a PPI. Excertion does not make it worse. She does get a soreness in back of throat. She did have an EGD in 2010 which showed reflux. She is diabetic. She has significant heart disease in family. She has had her gallbladder removed. She comes by way of her PCP. She does not take significant NSAIDs or drink etoh. She does not smoke.       Past Medical History:   Diagnosis Date     Adenomatous colon polyp      Essential (primary) hypertension     11/6/2014     Gastro-esophageal reflux disease without esophagitis     No Comments Provided     Generalized anxiety disorder     No Comments Provided     Major depressive disorder, single episode     No Comments Provided     Type 2 diabetes mellitus without complications (H)     No Comments Provided       Past Surgical History:   Procedure Laterality Date     BREAST BIOPSY, RT/LT Left 02/2019     CHOLECYSTECTOMY      No Comments Provided     COLONOSCOPY  01/01/2010 2010,Normal, neg. for occult colitis, due 2020     COLONOSCOPY  10/12/2020    F/U 2025 tubular adenoma     DILATION AND CURETTAGE      2004,Also endometrial ablation     ESOPHAGOSCOPY, GASTROSCOPY, DUODENOSCOPY (EGD), COMBINED      2011,Reactive gastropathy, mild to moderate refulux changes.     HYSTERECTOMY TOTAL ABDOMINAL      2006,LAVH/LSO     LAMINECT/DISCECTOMY, CERVICAL  2008     LAPAROSCOPIC TUBAL LIGATION      No Comments Provided     LAPAROSCOPY DIAGNOSTIC (GYN)      2007,diagnostic laparoscopy With lysis of adhesions     RELEASE CARPAL TUNNEL Bilateral        Allergies   Allergen Reactions     Metronidazole Anaphylaxis     Celecoxib Unknown     Metformin Diarrhea       Current Outpatient Medications   Medication     atorvastatin (LIPITOR)  20 MG tablet     B-D U/F 31G X 8 MM insulin pen needle     cholecalciferol (VITAMIN D3) 1000 UNIT tablet     citalopram (CELEXA) 20 MG tablet     conjugated estrogens (PREMARIN) 0.625 MG/GM vaginal cream     CONTOUR NEXT TEST test strip     dulaglutide (TRULICITY) 1.5 MG/0.5ML pen     glipiZIDE (GLUCOTROL) 10 MG tablet     ketoconazole (NIZORAL) 2 % external cream     lisinopril (ZESTRIL) 20 MG tablet     Microlet Lancets MISC     omeprazole (PRILOSEC) 20 MG CR capsule     sucralfate (CARAFATE) 1 GM tablet     empagliflozin (JARDIANCE) 10 MG TABS tablet     No current facility-administered medications for this visit.       HABITS:    Social History     Tobacco Use     Smoking status: Former     Types: Cigarettes     Quit date: 2002     Years since quittin.0     Smokeless tobacco: Never   Vaping Use     Vaping Use: Never used   Substance Use Topics     Alcohol use: Yes     Comment: occasional     Drug use: No     Comment: Drug use: No       Family History   Problem Relation Age of Onset     Other - See Comments Mother         Psychiatric illness,Some type of mental illness.     Lung Cancer Mother      Diabetes Father      Hypertension Father      Cancer Father         Urothelial cancer     Diabetes Maternal Grandmother         Diabetes     Diabetes Maternal Grandfather         Diabetes     Diabetes Paternal Grandmother         Diabetes     Diabetes Paternal Grandfather         Diabetes     Other - See Comments Other         Hysterectomy for prolapse.     Other - See Comments Other         Psychiatric illness,Bipolar disease.     Hypertension Brother      Coronary Artery Disease Brother         Stents     Diabetes Brother      Pancreatitis Brother        REVIEW OF SYSTEMS:  Ten point review of systems negative except those mentioned in the HPI.     OBJECTIVE:    /80 (BP Location: Right arm, Patient Position: Sitting, Cuff Size: Adult Regular)   Pulse 76   Temp 98.3  F (36.8  C) (Tympanic)   Ht 1.626  "m (5' 4\")   Wt 95.3 kg (210 lb)   SpO2 96%   BMI 36.05 kg/m      GENERAL: Generally appears well, in no distress with appropriate affect.  HEENT:   Sclerae anicteric - normocephalic atraumatic  No exam performed     IMPRESSION:    Retrosternal chest pain, differential includes esophagitis, peptic ulcer disease, esophogeal spasm, gastroparesis, hiatal hernia. Discussed getting an esophagram as well to look for sources for pain including evidence of significant relfux.     PLAN:    EGD and esophogram     Yonny Oshea MD,     3/8/2023  8:01 AM      "

## 2023-03-08 NOTE — H&P (VIEW-ONLY)
[facility] Surgery Consultation    CC:  Epigastric pain,     HPI:  This 57 year old year old female is seen at the request of Dr. Sow for evaluation of epigastric pain. She has had this ongoing for several years seems worse over the last 6 weeks or so. She is on a PPI. Excertion does not make it worse. She does get a soreness in back of throat. She did have an EGD in 2010 which showed reflux. She is diabetic. She has significant heart disease in family. She has had her gallbladder removed. She comes by way of her PCP. She does not take significant NSAIDs or drink etoh. She does not smoke.       Past Medical History:   Diagnosis Date     Adenomatous colon polyp      Essential (primary) hypertension     11/6/2014     Gastro-esophageal reflux disease without esophagitis     No Comments Provided     Generalized anxiety disorder     No Comments Provided     Major depressive disorder, single episode     No Comments Provided     Type 2 diabetes mellitus without complications (H)     No Comments Provided       Past Surgical History:   Procedure Laterality Date     BREAST BIOPSY, RT/LT Left 02/2019     CHOLECYSTECTOMY      No Comments Provided     COLONOSCOPY  01/01/2010 2010,Normal, neg. for occult colitis, due 2020     COLONOSCOPY  10/12/2020    F/U 2025 tubular adenoma     DILATION AND CURETTAGE      2004,Also endometrial ablation     ESOPHAGOSCOPY, GASTROSCOPY, DUODENOSCOPY (EGD), COMBINED      2011,Reactive gastropathy, mild to moderate refulux changes.     HYSTERECTOMY TOTAL ABDOMINAL      2006,LAVH/LSO     LAMINECT/DISCECTOMY, CERVICAL  2008     LAPAROSCOPIC TUBAL LIGATION      No Comments Provided     LAPAROSCOPY DIAGNOSTIC (GYN)      2007,diagnostic laparoscopy With lysis of adhesions     RELEASE CARPAL TUNNEL Bilateral        Allergies   Allergen Reactions     Metronidazole Anaphylaxis     Celecoxib Unknown     Metformin Diarrhea       Current Outpatient Medications   Medication     atorvastatin (LIPITOR)  20 MG tablet     B-D U/F 31G X 8 MM insulin pen needle     cholecalciferol (VITAMIN D3) 1000 UNIT tablet     citalopram (CELEXA) 20 MG tablet     conjugated estrogens (PREMARIN) 0.625 MG/GM vaginal cream     CONTOUR NEXT TEST test strip     dulaglutide (TRULICITY) 1.5 MG/0.5ML pen     glipiZIDE (GLUCOTROL) 10 MG tablet     ketoconazole (NIZORAL) 2 % external cream     lisinopril (ZESTRIL) 20 MG tablet     Microlet Lancets MISC     omeprazole (PRILOSEC) 20 MG CR capsule     sucralfate (CARAFATE) 1 GM tablet     empagliflozin (JARDIANCE) 10 MG TABS tablet     No current facility-administered medications for this visit.       HABITS:    Social History     Tobacco Use     Smoking status: Former     Types: Cigarettes     Quit date: 2002     Years since quittin.0     Smokeless tobacco: Never   Vaping Use     Vaping Use: Never used   Substance Use Topics     Alcohol use: Yes     Comment: occasional     Drug use: No     Comment: Drug use: No       Family History   Problem Relation Age of Onset     Other - See Comments Mother         Psychiatric illness,Some type of mental illness.     Lung Cancer Mother      Diabetes Father      Hypertension Father      Cancer Father         Urothelial cancer     Diabetes Maternal Grandmother         Diabetes     Diabetes Maternal Grandfather         Diabetes     Diabetes Paternal Grandmother         Diabetes     Diabetes Paternal Grandfather         Diabetes     Other - See Comments Other         Hysterectomy for prolapse.     Other - See Comments Other         Psychiatric illness,Bipolar disease.     Hypertension Brother      Coronary Artery Disease Brother         Stents     Diabetes Brother      Pancreatitis Brother        REVIEW OF SYSTEMS:  Ten point review of systems negative except those mentioned in the HPI.     OBJECTIVE:    /80 (BP Location: Right arm, Patient Position: Sitting, Cuff Size: Adult Regular)   Pulse 76   Temp 98.3  F (36.8  C) (Tympanic)   Ht 1.626  "m (5' 4\")   Wt 95.3 kg (210 lb)   SpO2 96%   BMI 36.05 kg/m      GENERAL: Generally appears well, in no distress with appropriate affect.  HEENT:   Sclerae anicteric - normocephalic atraumatic  No exam performed     IMPRESSION:    Retrosternal chest pain, differential includes esophagitis, peptic ulcer disease, esophogeal spasm, gastroparesis, hiatal hernia. Discussed getting an esophagram as well to look for sources for pain including evidence of significant relfux.     PLAN:    EGD and esophogram     Yonny Oshea MD,     3/8/2023  8:01 AM      "

## 2023-03-18 DIAGNOSIS — E11.9 TYPE 2 DIABETES MELLITUS WITHOUT COMPLICATION, WITHOUT LONG-TERM CURRENT USE OF INSULIN (H): ICD-10-CM

## 2023-03-21 NOTE — TELEPHONE ENCOUNTER
[id number]  sent Rx request for the following:      Requested Prescriptions   Pending Prescriptions Disp Refills     glipiZIDE (GLUCOTROL) 10 MG tablet [Pharmacy Med Name: glipiZIDE 10 MG Oral Tablet] 180 tablet 0     Sig: TAKE 1 TABLET BY MOUTH TWICE DAILY BEFORE MEAL(S)     Last Prescription Date:   12/28/22  Last Fill Qty/Refills:         180, R-0  Last Office Visit:              02/15/23   Future Office visit:           None    Ana López RN on 3/21/2023 at 4:28 PM

## 2023-03-22 RX ORDER — GLIPIZIDE 10 MG/1
TABLET ORAL
Qty: 180 TABLET | Refills: 0 | Status: SHIPPED | OUTPATIENT
Start: 2023-03-22 | End: 2023-06-20

## 2023-03-24 DIAGNOSIS — I10 ESSENTIAL HYPERTENSION: ICD-10-CM

## 2023-03-24 DIAGNOSIS — E78.5 HYPERLIPIDEMIA LDL GOAL <100: ICD-10-CM

## 2023-03-24 DIAGNOSIS — F34.1 DYSTHYMIC DISORDER: ICD-10-CM

## 2023-03-24 RX ORDER — ATORVASTATIN CALCIUM 20 MG/1
TABLET, FILM COATED ORAL
Qty: 90 TABLET | Refills: 1 | Status: SHIPPED | OUTPATIENT
Start: 2023-03-24 | End: 2023-07-27

## 2023-03-24 RX ORDER — CITALOPRAM HYDROBROMIDE 20 MG/1
TABLET ORAL
Qty: 90 TABLET | Refills: 1 | Status: SHIPPED | OUTPATIENT
Start: 2023-03-24 | End: 2023-07-27

## 2023-03-24 RX ORDER — LISINOPRIL 20 MG/1
TABLET ORAL
Qty: 90 TABLET | Refills: 1 | Status: SHIPPED | OUTPATIENT
Start: 2023-03-24 | End: 2023-07-27

## 2023-03-24 NOTE — TELEPHONE ENCOUNTER
[facility] sent Rx request for the following:      Requested Prescriptions   Pending Prescriptions Disp Refills     atorvastatin (LIPITOR) 20 MG tablet [Pharmacy Med Name: Atorvastatin Calcium 20 MG Oral Tablet] 90 tablet 0     citalopram (CELEXA) 20 MG tablet [Pharmacy Med Name: Citalopram Hydrobromide 20 MG Oral Tablet] 90 tablet 0     Sig: Take 1 tablet by mouth once daily     lisinopril (ZESTRIL) 20 MG tablet [Pharmacy Med Name: Lisinopril 20 MG Oral Tablet] 90 tablet 0     Sig: Take 1 tablet by mouth once daily   Last Prescription Date:   3/9/22  Last Fill Qty/Refills:         90, R-3    Last Office Visit:              2/15/23   Future Office visit:           None   In clinical absence of patient's primary, Richard Sow, patient is requesting that this message be sent to the covering provider for consideration please.  Perla Celis RN on 3/24/2023 at 2:07 PM

## 2023-03-29 ENCOUNTER — ANESTHESIA EVENT (OUTPATIENT)
Dept: SURGERY | Facility: OTHER | Age: 58
End: 2023-03-29
Payer: COMMERCIAL

## 2023-03-29 ENCOUNTER — HOSPITAL ENCOUNTER (OUTPATIENT)
Facility: OTHER | Age: 58
Discharge: HOME | End: 2023-03-29
Attending: SURGERY | Admitting: SURGERY
Payer: COMMERCIAL

## 2023-03-29 ENCOUNTER — ANESTHESIA (OUTPATIENT)
Dept: SURGERY | Facility: OTHER | Age: 58
End: 2023-03-29
Payer: COMMERCIAL

## 2023-03-29 VITALS
OXYGEN SATURATION: 99 % | TEMPERATURE: 97.4 F | RESPIRATION RATE: 16 BRPM | SYSTOLIC BLOOD PRESSURE: 102 MMHG | BODY MASS INDEX: 36.05 KG/M2 | DIASTOLIC BLOOD PRESSURE: 64 MMHG | WEIGHT: 210 LBS | HEART RATE: 77 BPM

## 2023-03-29 DIAGNOSIS — K29.60 BILE REFLUX GASTRITIS: Primary | ICD-10-CM

## 2023-03-29 DIAGNOSIS — K31.7 GASTRIC POLYPS: ICD-10-CM

## 2023-03-29 PROCEDURE — 88305 TISSUE EXAM BY PATHOLOGIST: CPT

## 2023-03-29 PROCEDURE — 43239 EGD BIOPSY SINGLE/MULTIPLE: CPT | Performed by: SURGERY

## 2023-03-29 PROCEDURE — 999N000010 HC STATISTIC ANES STAT CODE-CRNA PER MINUTE: Performed by: SURGERY

## 2023-03-29 PROCEDURE — 250N000009 HC RX 250: Performed by: NURSE ANESTHETIST, CERTIFIED REGISTERED

## 2023-03-29 PROCEDURE — 43239 EGD BIOPSY SINGLE/MULTIPLE: CPT | Performed by: NURSE ANESTHETIST, CERTIFIED REGISTERED

## 2023-03-29 PROCEDURE — 250N000011 HC RX IP 250 OP 636: Performed by: NURSE ANESTHETIST, CERTIFIED REGISTERED

## 2023-03-29 PROCEDURE — 258N000003 HC RX IP 258 OP 636: Performed by: SURGERY

## 2023-03-29 RX ORDER — FLUMAZENIL 0.1 MG/ML
0.2 INJECTION, SOLUTION INTRAVENOUS
Status: CANCELLED | OUTPATIENT
Start: 2023-03-29 | End: 2023-03-30

## 2023-03-29 RX ORDER — LIDOCAINE 40 MG/G
CREAM TOPICAL
Status: DISCONTINUED | OUTPATIENT
Start: 2023-03-29 | End: 2023-03-29 | Stop reason: HOSPADM

## 2023-03-29 RX ORDER — NALOXONE HYDROCHLORIDE 0.4 MG/ML
0.2 INJECTION, SOLUTION INTRAMUSCULAR; INTRAVENOUS; SUBCUTANEOUS
Status: CANCELLED | OUTPATIENT
Start: 2023-03-29

## 2023-03-29 RX ORDER — PROCHLORPERAZINE MALEATE 5 MG
10 TABLET ORAL EVERY 6 HOURS PRN
Status: CANCELLED | OUTPATIENT
Start: 2023-03-29

## 2023-03-29 RX ORDER — NALOXONE HYDROCHLORIDE 0.4 MG/ML
0.4 INJECTION, SOLUTION INTRAMUSCULAR; INTRAVENOUS; SUBCUTANEOUS
Status: CANCELLED | OUTPATIENT
Start: 2023-03-29

## 2023-03-29 RX ORDER — PROPOFOL 10 MG/ML
INJECTION, EMULSION INTRAVENOUS PRN
Status: DISCONTINUED | OUTPATIENT
Start: 2023-03-29 | End: 2023-03-29

## 2023-03-29 RX ORDER — ONDANSETRON 2 MG/ML
4 INJECTION INTRAMUSCULAR; INTRAVENOUS EVERY 6 HOURS PRN
Status: CANCELLED | OUTPATIENT
Start: 2023-03-29

## 2023-03-29 RX ORDER — ONDANSETRON 4 MG/1
4 TABLET, ORALLY DISINTEGRATING ORAL EVERY 6 HOURS PRN
Status: CANCELLED | OUTPATIENT
Start: 2023-03-29

## 2023-03-29 RX ORDER — SODIUM CHLORIDE, SODIUM LACTATE, POTASSIUM CHLORIDE, CALCIUM CHLORIDE 600; 310; 30; 20 MG/100ML; MG/100ML; MG/100ML; MG/100ML
INJECTION, SOLUTION INTRAVENOUS CONTINUOUS
Status: DISCONTINUED | OUTPATIENT
Start: 2023-03-29 | End: 2023-03-29 | Stop reason: HOSPADM

## 2023-03-29 RX ORDER — ONDANSETRON 2 MG/ML
4 INJECTION INTRAMUSCULAR; INTRAVENOUS
Status: DISCONTINUED | OUTPATIENT
Start: 2023-03-29 | End: 2023-03-29 | Stop reason: HOSPADM

## 2023-03-29 RX ORDER — LIDOCAINE HYDROCHLORIDE 20 MG/ML
INJECTION, SOLUTION INFILTRATION; PERINEURAL PRN
Status: DISCONTINUED | OUTPATIENT
Start: 2023-03-29 | End: 2023-03-29

## 2023-03-29 RX ADMIN — PROPOFOL 30 MG: 10 INJECTION, EMULSION INTRAVENOUS at 12:03

## 2023-03-29 RX ADMIN — PROPOFOL 50 MG: 10 INJECTION, EMULSION INTRAVENOUS at 12:01

## 2023-03-29 RX ADMIN — LIDOCAINE HYDROCHLORIDE 40 MG: 20 INJECTION, SOLUTION INFILTRATION; PERINEURAL at 11:59

## 2023-03-29 RX ADMIN — PROPOFOL 20 MG: 10 INJECTION, EMULSION INTRAVENOUS at 12:05

## 2023-03-29 RX ADMIN — PROPOFOL 100 MG: 10 INJECTION, EMULSION INTRAVENOUS at 11:59

## 2023-03-29 RX ADMIN — SODIUM CHLORIDE, POTASSIUM CHLORIDE, SODIUM LACTATE AND CALCIUM CHLORIDE: 600; 310; 30; 20 INJECTION, SOLUTION INTRAVENOUS at 10:32

## 2023-03-29 ASSESSMENT — ACTIVITIES OF DAILY LIVING (ADL)
ADLS_ACUITY_SCORE: 35
ADLS_ACUITY_SCORE: 35

## 2023-03-29 NOTE — INTERVAL H&P NOTE
"I have reviewed the note.   Pulse 82   Temp 97.2  F (36.2  C) (Tympanic)   Resp 18   Wt 95.3 kg (210 lb)   SpO2 98%   BMI 36.05 kg/m    General: NAD  Heart: regular, no murmur  Lungs: clear  Abdomen: non tender, no hernia noted    Clinical Conditions Present on Arrival:  Clinically Significant Risk Factors Present on Admission                  # DMII: A1C = 7.4 % (Ref range: 4.0 - 6.2 %) within past 6 months  # Obesity: Estimated body mass index is 36.05 kg/m  as calculated from the following:    Height as of 3/2/23: 1.626 m (5' 4\").    Weight as of this encounter: 95.3 kg (210 lb).       "

## 2023-03-29 NOTE — OR NURSING
Liudmila has been discharged to home at 1235 via ambulatory accompanied by self. Friend Kelly.    Written discharge instructions were provided to patient.  Prescriptions were none.      Patient and adult caring for them verbalize understanding of discharge instructions including no driving until tomorrow and no longer taking narcotic pain medications - no operating mechanical equipment and no making any important decisions.They understand reason for discharge, and necessary follow-up appointments.

## 2023-03-29 NOTE — ANESTHESIA CARE TRANSFER NOTE
Patient: Jackie Donahue    Procedure: Procedure(s):  Esophagoscopy, gastroscopy, duodenoscopy (EGD), combined       Diagnosis: Epigastric pain [R10.13]  Diagnosis Additional Information: No value filed.    Anesthesia Type:   MAC     Note:    Oropharynx: oropharynx clear of all foreign objects and spontaneously breathing  Level of Consciousness: drowsy  Oxygen Supplementation: room air    Independent Airway: airway patency satisfactory and stable  Dentition: dentition unchanged  Vital Signs Stable: post-procedure vital signs reviewed and stable  Report to RN Given: handoff report given  Patient transferred to: Phase II    Handoff Report: Identifed the Patient, Identified the Reponsible Provider, Reviewed the pertinent medical history, Discussed the surgical course, Reviewed Intra-OP anesthesia mangement and issues during anesthesia, Set expectations for post-procedure period and Allowed opportunity for questions and acknowledgement of understanding      Vitals:  Vitals Value Taken Time   BP     Temp     Pulse     Resp     SpO2 95 % 03/29/23 1213   Vitals shown include unvalidated device data.    Electronically Signed By: LUZ MARIA Jackson CRNA  March 29, 2023  12:14 PM

## 2023-03-29 NOTE — ANESTHESIA POSTPROCEDURE EVALUATION
Patient: Jackie Donauhe    Procedure: Procedure(s):  Esophagoscopy, gastroscopy, duodenoscopy (EGD), combined       Anesthesia Type:  MAC    Note:  Disposition: Outpatient   Postop Pain Control: Uneventful            Sign Out: Well controlled pain   PONV: No   Neuro/Psych: Uneventful            Sign Out: Acceptable/Baseline neuro status   Airway/Respiratory: Uneventful            Sign Out: Acceptable/Baseline resp. status   CV/Hemodynamics: Uneventful            Sign Out: Acceptable CV status; No obvious hypovolemia; No obvious fluid overload   Other NRE: NONE   DID A NON-ROUTINE EVENT OCCUR? No           Last vitals:  Vitals Value Taken Time   /80 03/29/23 1220   Temp 97.8  F (36.6  C) 03/29/23 1212   Pulse 68 03/29/23 1220   Resp 16 03/29/23 1212   SpO2 97 % 03/29/23 1233   Vitals shown include unvalidated device data.    Electronically Signed By: LUZ MARIA Jackson CRNA  March 29, 2023  12:34 PM

## 2023-03-29 NOTE — ANESTHESIA PREPROCEDURE EVALUATION
Anesthesia Pre-Procedure Evaluation    Patient: Jackie Donahue   MRN: [id number] : 1965        Procedure : Procedure(s):  Esophagoscopy, gastroscopy, duodenoscopy (EGD), combined          Past Medical History:   Diagnosis Date     Adenomatous colon polyp      Essential (primary) hypertension     2014     Gastro-esophageal reflux disease without esophagitis     No Comments Provided     Generalized anxiety disorder     No Comments Provided     Major depressive disorder, single episode     No Comments Provided     Type 2 diabetes mellitus without complications (H)     No Comments Provided      Past Surgical History:   Procedure Laterality Date     BREAST BIOPSY, RT/LT Left 2019     CHOLECYSTECTOMY      No Comments Provided     COLONOSCOPY  2010,Normal, neg. for occult colitis, due      COLONOSCOPY  10/12/2020    F/U  tubular adenoma     DILATION AND CURETTAGE      ,Also endometrial ablation     ESOPHAGOSCOPY, GASTROSCOPY, DUODENOSCOPY (EGD), COMBINED      ,Reactive gastropathy, mild to moderate refulux changes.     HYSTERECTOMY TOTAL ABDOMINAL      ,LAVH/LSO     LAMINECT/DISCECTOMY, CERVICAL       LAPAROSCOPIC TUBAL LIGATION      No Comments Provided     LAPAROSCOPY DIAGNOSTIC (GYN)      ,diagnostic laparoscopy With lysis of adhesions     RELEASE CARPAL TUNNEL Bilateral       Allergies   Allergen Reactions     Metronidazole Anaphylaxis     Celecoxib Unknown     Metformin Diarrhea      Social History     Tobacco Use     Smoking status: Former     Types: Cigarettes     Quit date: 2002     Years since quittin.0     Smokeless tobacco: Never   Substance Use Topics     Alcohol use: Yes     Comment: occasional      Wt Readings from Last 1 Encounters:   23 95.3 kg (210 lb)        Anesthesia Evaluation   Pt has had prior anesthetic.         ROS/MED HX  ENT/Pulmonary:       Neurologic:       Cardiovascular:     (+) hypertension-----    METS/Exercise  Tolerance: >4 METS    Hematologic:       Musculoskeletal:       GI/Hepatic:     (+) GERD,     Renal/Genitourinary:       Endo:     (+) type II DM, Diabetic complications: neuropathy.     Psychiatric/Substance Use:       Infectious Disease:       Malignancy:       Other:            Physical Exam    Airway        Mallampati: II   TM distance: > 3 FB   Neck ROM: full   Mouth opening: > 3 cm    Respiratory Devices and Support         Dental       (+) Minor Abnormalities - some fillings, tiny chips      Cardiovascular   cardiovascular exam normal       Rhythm and rate: regular and normal     Pulmonary   pulmonary exam normal        breath sounds clear to auscultation           OUTSIDE LABS:  CBC:   Lab Results   Component Value Date    WBC 8.1 02/15/2023    HGB 12.4 02/15/2023    HGB 11.2 (L) 01/12/2018    HCT 36.6 02/15/2023    HCT 33.0 01/12/2018     02/15/2023     01/12/2018     BMP:   Lab Results   Component Value Date     02/15/2023     03/09/2022    POTASSIUM 4.2 02/15/2023    POTASSIUM 4.2 03/09/2022    CHLORIDE 105 02/15/2023    CHLORIDE 103 03/09/2022    CO2 28 02/15/2023    CO2 29 03/09/2022    BUN 21.0 (H) 02/15/2023    BUN 18 03/09/2022    CR 0.75 02/15/2023    CR 0.81 03/09/2022     (H) 02/15/2023     (H) 03/09/2022     COAGS:   Lab Results   Component Value Date    PTT 33 01/15/2016    INR 1.1 01/15/2016     POC: No results found for: BGM, HCG, HCGS  HEPATIC:   Lab Results   Component Value Date    ALBUMIN 4.2 02/15/2023    PROTTOTAL 7.1 02/15/2023    ALT 60 (H) 02/15/2023    AST 28 02/15/2023    ALKPHOS 90 02/15/2023    BILITOTAL 0.5 02/15/2023     OTHER:   Lab Results   Component Value Date    A1C 7.4 (H) 02/15/2023    FERNY 9.4 02/15/2023    LIPASE 54 02/15/2023    AMYLASE 49 02/15/2023    T4 0.80 10/20/2014    SED 9 01/12/2018       Anesthesia Plan    ASA Status:  2   NPO Status:  NPO Appropriate    Anesthesia Type: MAC.     - Reason for MAC: straight local not  clinically adequate   Induction: Intravenous.   Maintenance: Balanced.        Consents    Anesthesia Plan(s) and associated risks, benefits, and realistic alternatives discussed. Questions answered and patient/representative(s) expressed understanding.     - Discussed: Risks, Benefits and Alternatives for BOTH SEDATION and the PROCEDURE were discussed     - Discussed with:  Patient         Postoperative Care            Comments:    Other Comments: Risks, benefits and alternatives discussed and would like to proceed. General anesthesia ok if indicated.             LUZ MARIA Rasmussen CRNA

## 2023-03-29 NOTE — DISCHARGE INSTRUCTIONS
Procedure you had done: EGD with bipsies  Your health care provider is:  Richard Sow  Your surgeon is Dr. Mag Sheehan.   Please call your health care provider or surgeon at [phone] if:    - you feel you are getting worse or having an increase in problems    - fever greater than 101 degrees  - increasing shortness of breath or chest pain  - any signs of infection (increasing redness, swelling, tenderness, warmth, change in appearance, or  increased drainage)  - blood in your urine or stool  - coughing or vomiting blood  - nausea (upset stomach) and vomiting and/or diarrhea that will not stop  - severe pain that is not relieved by medicine, rest or ice  You have had medications for sedation. Please be aware that this can cause drowsiness and impaired judgment for up to 24 hours after your procedure. Do not drive, operate power tools or drink alcohol for 24 hours.  If samples were taken-you will get a phone call and a letter with your results in the next 7-10 days. If you don't get results, please call and let us know!     [facility]  Adult Discharge Orders & Instructions    ________________________________________________________________          For 12 hours after surgery  Get plenty of rest.  A responsible adult must stay with you for at least 12 hours after you leave the hospital.   You may feel lightheaded.  IF so, sit for a few minutes before standing.  Have someone help you get up.   You may have a slight fever. Call the doctor if your fever is over 101 F (38.3 C) (taken under the tongue) or lasts longer than 24 hours.  You may have a dry mouth, a sore throat, muscle aches or trouble sleeping.  These should go away after 24 hours.  Do not make important or legal decisions.  6.   Do not drive or use heavy equipment.  If you have weakness or tingling, don't drive or use heavy equipment until this feeling goes away.    To contact a doctor, call   [phone]_______________________

## 2023-03-29 NOTE — OP NOTE
PROCEDURE NOTE    SURGEON: Mag Sheehan MD.    PRE-OP DIAGNOSIS:   Epigastric pain      POST-OP DIAGNOSIS: bile reflux, gastric polyps    PROCEDURE PLANNED:   Diagnostic EGD, flexible        PROCEDURE PERFORMED:  EGD with biopsy, flexible    SPECIMEN:  Duodenum, antrum, gastric polyp, GEJ, esophageal biopsies    ANESTHESIA: See anesthesia record    ESTIMATED BLOOD LOSS: None    COMPLICATIONS:  None    INDICATION FORTHE PROCEDURE: The patient is a 57 year oldfemale. The patient presents with epigastric pain. I explained to the patient the risks, benefits and alternatives to diagnostic EGD for evaluating her complaints. We specifically discussed the risks of bleeding, infection, perforation and the risks of sedation. The patient's questions were answered and the patient wished to proceed. Informed consent paperwork was completed.    PROCEDURE: The patient was taken to the endoscopy suite. Appropriate monitors were attached. The patient was placed in the left lateral decubitus position. Bite block was positioned.Timeout was performed confirming the patient's identity and procedure to be performed. After appropriate sedation was confirmed, the flexible endoscope was advanced into the oropharynx. The posterior oropharynx appeared grossly normal. The scope was advanced into the proximal esophagus. The esophagus was insufflated with air. The scope was advanced under direct visualization. No acute abnormalities of the esophagus were noted. The scope was advanced into the stomach. Bile reflux and mild gastritis was noted. Gastric polyps were noted. The scope was advanced through the pylorus into the duodenal bulb. The bulb and distal duodenum appeared grossly normal. Biopsies were taken. The scope was withdrawn back into the stomach. Antral biopsy was obtained and sent to pathology. Gastric polyps were noted and biopsied.  The scope was retroflexed and the GE junction inspected. No abnormalities were noted. The scope was  returned to a neutral position and the stomach was decompressed. The scope was withdrawn to the GE junction and biopsy obtained. The mucosa of the esophagus was inspected while withdrawing the scope. No abnormalities were noted. A mid esophageal biopsy was taken. The scope was withdrawn from the patient. The bite block was removed. The patient tolerated the procedure with no immediately apparent complication. The patient was taken to recovery in stable condition.    FOLLOW UP:  RECOMMENDATIONS Will call with pathology results.

## 2023-03-31 LAB
PATH REPORT.COMMENTS IMP SPEC: NORMAL
PATH REPORT.FINAL DX SPEC: NORMAL
PATH REPORT.RELEVANT HX SPEC: NORMAL
PHOTO IMAGE: NORMAL

## 2023-04-04 NOTE — RESULT ENCOUNTER NOTE
Leann/Luci/Lisa-Please call patient and let them know that their EGD showed no abnormalities. There was no sign of infection or inflammation. They should see their primary care provider for further issues. They should call with questions or concerns. Thanks!

## 2023-04-24 ENCOUNTER — MYC MEDICAL ADVICE (OUTPATIENT)
Dept: INTERNAL MEDICINE | Facility: OTHER | Age: 58
End: 2023-04-24
Payer: COMMERCIAL

## 2023-04-25 NOTE — TELEPHONE ENCOUNTER
Called [facility] regarding patient's MyChart order about her broken microlet device to put lancets in.  They said they have to order a new one in and were asking for an order.  I cannot find anything in Epic to order this.  I was given to KALPANA Quinones to give a verbal order for this.  This was completed and as Magi was old PCP, gave MICHELLE Harrison name for order.      Patient updated on MyChart.      Simi Pederson RN on 4/25/2023 at 11:14 AM

## 2023-05-06 ENCOUNTER — HEALTH MAINTENANCE LETTER (OUTPATIENT)
Age: 58
End: 2023-05-06

## 2023-05-19 ENCOUNTER — HOSPITAL ENCOUNTER (OUTPATIENT)
Dept: MAMMOGRAPHY | Facility: OTHER | Age: 58
Discharge: HOME | End: 2023-05-19
Attending: INTERNAL MEDICINE | Admitting: INTERNAL MEDICINE
Payer: COMMERCIAL

## 2023-05-19 DIAGNOSIS — Z12.31 VISIT FOR SCREENING MAMMOGRAM: ICD-10-CM

## 2023-05-19 PROCEDURE — 77067 SCR MAMMO BI INCL CAD: CPT

## 2023-06-20 ENCOUNTER — TELEPHONE (OUTPATIENT)
Dept: INTERNAL MEDICINE | Facility: OTHER | Age: 58
End: 2023-06-20
Payer: COMMERCIAL

## 2023-06-20 DIAGNOSIS — E11.9 TYPE 2 DIABETES MELLITUS WITHOUT COMPLICATION, WITHOUT LONG-TERM CURRENT USE OF INSULIN (H): ICD-10-CM

## 2023-06-20 RX ORDER — GLIPIZIDE 10 MG/1
10 TABLET ORAL
Qty: 180 TABLET | Refills: 1 | Status: SHIPPED | OUTPATIENT
Start: 2023-06-20 | End: 2023-07-27

## 2023-06-20 NOTE — TELEPHONE ENCOUNTER
Reason for call: Medication or medication refill    Name of medication requested: Glipizide    Are you out of the medication? no    What pharmacy do you use? [facility]    Preferred method for responding to this message: Telephone Call    Phone number patient can be reached at: Cell number on file:    Telephone Information:   Mobile [phone]       If we cannot reach you directly, may we leave a detailed response at the number you provided? Yes    Patient is in middle of switching docs to [place] she needs this script within a week or little over

## 2023-06-20 NOTE — TELEPHONE ENCOUNTER
Patient sent Rx request for the following:      Requested Prescriptions   Pending Prescriptions Disp Refills     glipiZIDE (GLUCOTROL) 10 MG tablet 180 tablet 1   Last Prescription Date:   3/22/23  Last Fill Qty/Refills:         180, R-0    Last Office Visit:              2/15/23   Future Office visit:           7/27/23    In clinical absence of patient's primary, Richard Sow, patient is requesting that this message be sent to the covering provider for consideration please.  Perla Celis RN on 6/20/2023 at 2:35 PM

## 2023-06-21 ENCOUNTER — OFFICE VISIT (OUTPATIENT)
Dept: OBGYN | Facility: OTHER | Age: 58
End: 2023-06-21
Payer: COMMERCIAL

## 2023-06-21 VITALS
HEART RATE: 85 BPM | SYSTOLIC BLOOD PRESSURE: 120 MMHG | DIASTOLIC BLOOD PRESSURE: 74 MMHG | BODY MASS INDEX: 36.05 KG/M2 | OXYGEN SATURATION: 96 % | WEIGHT: 210 LBS

## 2023-06-21 DIAGNOSIS — L73.9 FOLLICULITIS: Primary | ICD-10-CM

## 2023-06-21 PROCEDURE — 99213 OFFICE O/P EST LOW 20 MIN: CPT | Mod: 25

## 2023-06-21 PROCEDURE — 10060 I&D ABSCESS SIMPLE/SINGLE: CPT

## 2023-06-21 ASSESSMENT — PATIENT HEALTH QUESTIONNAIRE - PHQ9
SUM OF ALL RESPONSES TO PHQ QUESTIONS 1-9: 1
SUM OF ALL RESPONSES TO PHQ QUESTIONS 1-9: 1
10. IF YOU CHECKED OFF ANY PROBLEMS, HOW DIFFICULT HAVE THESE PROBLEMS MADE IT FOR YOU TO DO YOUR WORK, TAKE CARE OF THINGS AT HOME, OR GET ALONG WITH OTHER PEOPLE: NOT DIFFICULT AT ALL

## 2023-06-21 ASSESSMENT — PAIN SCALES - GENERAL: PAINLEVEL: MILD PAIN (3)

## 2023-06-21 NOTE — NURSING NOTE
Patient presents to clinic for concerns of bump on vulva  /74   Pulse 85   Wt 95.3 kg (210 lb)   SpO2 96%   BMI 36.05 kg/m    Aury Chavez LPN on 6/21/2023 at 8:16 AM

## 2023-06-21 NOTE — PROGRESS NOTES
Follow-Up Visit    S: Ms. Jackie Donahue is a 57 year old  here for vaginal bump, She notes this has been there about a week. She states she and her  had tried to express the fluid and got some pus out but they were unable to express it all. She is wondering what this is today.    O:  /74   Pulse 85   Wt 95.3 kg (210 lb)   SpO2 96%   BMI 36.05 kg/m    Gen: Well-appearing, NAD  Pulm: nonlabored  Pelvic:  Normal appearing external female genitalia. Normal hair distribution. Left Vulva noted to have two inflamed and infected follicles. One is on the labia majora at 3 o'clock approximately 3mm in size with white head and one is on the labia minora on the left at 5-6 o'clock approximately 1cm in size. Follicle at 5-6 oclock is inflamed and noted to be leaking yellow discharge.     Procedure: After discussing risks vs benefits the patient requests drainage today. Areas are prepped with betadine. Follicle at 3 oclock is stabbed with needle and expressed with no concerns. Area at 5-6 oclock is noted to be to painful to express so 1% lidocaine is used to infiltrate area and is expressed with yellow pus like fluid noted followed by serosang drainage.     A/P:  Ms. Jackie Donahue is a 57 year old  here for infected follicles on her vagina. Expression is done today with relief of pain. Discussed vulva hygiene recommendations along with sitz baths to assist with drainage of these areas completely. Healing expectations provided. She has no further concerns.     BREANNA Farr-C  2023 8:56 AM

## 2023-07-27 ENCOUNTER — OFFICE VISIT (OUTPATIENT)
Dept: INTERNAL MEDICINE | Facility: OTHER | Age: 58
End: 2023-07-27
Attending: STUDENT IN AN ORGANIZED HEALTH CARE EDUCATION/TRAINING PROGRAM
Payer: COMMERCIAL

## 2023-07-27 VITALS
DIASTOLIC BLOOD PRESSURE: 72 MMHG | WEIGHT: 208.6 LBS | TEMPERATURE: 97.1 F | HEIGHT: 65 IN | BODY MASS INDEX: 34.75 KG/M2 | OXYGEN SATURATION: 96 % | SYSTOLIC BLOOD PRESSURE: 122 MMHG | HEART RATE: 62 BPM | RESPIRATION RATE: 16 BRPM

## 2023-07-27 DIAGNOSIS — R21 RASH AND NONSPECIFIC SKIN ERUPTION: ICD-10-CM

## 2023-07-27 DIAGNOSIS — E11.9 TYPE 2 DIABETES MELLITUS WITHOUT COMPLICATION, WITHOUT LONG-TERM CURRENT USE OF INSULIN (H): ICD-10-CM

## 2023-07-27 DIAGNOSIS — K21.9 GASTROESOPHAGEAL REFLUX DISEASE WITHOUT ESOPHAGITIS: ICD-10-CM

## 2023-07-27 DIAGNOSIS — E78.5 HYPERLIPIDEMIA LDL GOAL <100: ICD-10-CM

## 2023-07-27 DIAGNOSIS — E66.01 MORBID OBESITY (H): ICD-10-CM

## 2023-07-27 DIAGNOSIS — F34.1 DYSTHYMIC DISORDER: ICD-10-CM

## 2023-07-27 DIAGNOSIS — Z00.00 ENCOUNTER FOR ANNUAL PHYSICAL EXAM: Primary | ICD-10-CM

## 2023-07-27 DIAGNOSIS — I10 ESSENTIAL HYPERTENSION: ICD-10-CM

## 2023-07-27 LAB
ALBUMIN SERPL BCG-MCNC: 4.6 G/DL (ref 3.5–5.2)
ALP SERPL-CCNC: 84 U/L (ref 35–104)
ALT SERPL W P-5'-P-CCNC: 47 U/L (ref 0–50)
ANION GAP SERPL CALCULATED.3IONS-SCNC: 8 MMOL/L (ref 7–15)
AST SERPL W P-5'-P-CCNC: 29 U/L (ref 0–45)
BILIRUB SERPL-MCNC: 0.6 MG/DL
BUN SERPL-MCNC: 17.9 MG/DL (ref 6–20)
CALCIUM SERPL-MCNC: 9.3 MG/DL (ref 8.6–10)
CHLORIDE SERPL-SCNC: 107 MMOL/L (ref 98–107)
CHOLEST SERPL-MCNC: 125 MG/DL
CREAT SERPL-MCNC: 0.82 MG/DL (ref 0.51–0.95)
CREAT UR-MCNC: 27 MG/DL
DEPRECATED HCO3 PLAS-SCNC: 27 MMOL/L (ref 22–29)
ERYTHROCYTE [DISTWIDTH] IN BLOOD BY AUTOMATED COUNT: 12.6 % (ref 10–15)
GFR SERPL CREATININE-BSD FRML MDRD: 83 ML/MIN/1.73M2
GLUCOSE SERPL-MCNC: 84 MG/DL (ref 70–99)
HBA1C MFR BLD: 7 % (ref 4–6.2)
HCT VFR BLD AUTO: 39.8 % (ref 35–47)
HDLC SERPL-MCNC: 43 MG/DL
HGB BLD-MCNC: 13.4 G/DL (ref 11.7–15.7)
LDLC SERPL CALC-MCNC: 65 MG/DL
MCH RBC QN AUTO: 30.2 PG (ref 26.5–33)
MCHC RBC AUTO-ENTMCNC: 33.7 G/DL (ref 31.5–36.5)
MCV RBC AUTO: 90 FL (ref 78–100)
MICROALBUMIN UR-MCNC: <12 MG/L
MICROALBUMIN/CREAT UR: NORMAL MG/G{CREAT}
NONHDLC SERPL-MCNC: 82 MG/DL
PLATELET # BLD AUTO: 256 10E3/UL (ref 150–450)
POTASSIUM SERPL-SCNC: 4.1 MMOL/L (ref 3.4–5.3)
PROT SERPL-MCNC: 7.3 G/DL (ref 6.4–8.3)
RBC # BLD AUTO: 4.43 10E6/UL (ref 3.8–5.2)
SODIUM SERPL-SCNC: 142 MMOL/L (ref 136–145)
TRIGL SERPL-MCNC: 87 MG/DL
WBC # BLD AUTO: 9 10E3/UL (ref 4–11)

## 2023-07-27 PROCEDURE — 82570 ASSAY OF URINE CREATININE: CPT | Mod: ZL | Performed by: STUDENT IN AN ORGANIZED HEALTH CARE EDUCATION/TRAINING PROGRAM

## 2023-07-27 PROCEDURE — 36415 COLL VENOUS BLD VENIPUNCTURE: CPT | Mod: ZL | Performed by: STUDENT IN AN ORGANIZED HEALTH CARE EDUCATION/TRAINING PROGRAM

## 2023-07-27 PROCEDURE — 99396 PREV VISIT EST AGE 40-64: CPT | Performed by: STUDENT IN AN ORGANIZED HEALTH CARE EDUCATION/TRAINING PROGRAM

## 2023-07-27 PROCEDURE — 83036 HEMOGLOBIN GLYCOSYLATED A1C: CPT | Mod: ZL | Performed by: STUDENT IN AN ORGANIZED HEALTH CARE EDUCATION/TRAINING PROGRAM

## 2023-07-27 PROCEDURE — 99214 OFFICE O/P EST MOD 30 MIN: CPT | Mod: 25 | Performed by: STUDENT IN AN ORGANIZED HEALTH CARE EDUCATION/TRAINING PROGRAM

## 2023-07-27 PROCEDURE — 80053 COMPREHEN METABOLIC PANEL: CPT | Mod: ZL | Performed by: STUDENT IN AN ORGANIZED HEALTH CARE EDUCATION/TRAINING PROGRAM

## 2023-07-27 PROCEDURE — 80061 LIPID PANEL: CPT | Mod: ZL | Performed by: STUDENT IN AN ORGANIZED HEALTH CARE EDUCATION/TRAINING PROGRAM

## 2023-07-27 PROCEDURE — 85027 COMPLETE CBC AUTOMATED: CPT | Mod: ZL | Performed by: STUDENT IN AN ORGANIZED HEALTH CARE EDUCATION/TRAINING PROGRAM

## 2023-07-27 RX ORDER — DULAGLUTIDE 3 MG/.5ML
3 INJECTION, SOLUTION SUBCUTANEOUS WEEKLY
Qty: 6 ML | Refills: 4 | Status: SHIPPED | OUTPATIENT
Start: 2023-07-27 | End: 2023-09-18

## 2023-07-27 RX ORDER — ACYCLOVIR 400 MG/1
1 TABLET ORAL
Qty: 3 EACH | Refills: 5 | Status: SHIPPED | OUTPATIENT
Start: 2023-07-27 | End: 2024-02-16

## 2023-07-27 RX ORDER — CITALOPRAM HYDROBROMIDE 20 MG/1
20 TABLET ORAL DAILY
Qty: 90 TABLET | Refills: 4 | Status: SHIPPED | OUTPATIENT
Start: 2023-07-27 | End: 2024-09-11

## 2023-07-27 RX ORDER — LISINOPRIL 20 MG/1
20 TABLET ORAL DAILY
Qty: 90 TABLET | Refills: 4 | Status: SHIPPED | OUTPATIENT
Start: 2023-07-27 | End: 2024-09-11

## 2023-07-27 RX ORDER — ACYCLOVIR 400 MG/1
1 TABLET ORAL ONCE
Qty: 1 EACH | Refills: 0 | Status: SHIPPED | OUTPATIENT
Start: 2023-07-27 | End: 2023-07-27

## 2023-07-27 RX ORDER — ATORVASTATIN CALCIUM 20 MG/1
20 TABLET, FILM COATED ORAL DAILY
Qty: 90 TABLET | Refills: 4 | Status: SHIPPED | OUTPATIENT
Start: 2023-07-27 | End: 2024-09-11

## 2023-07-27 RX ORDER — NYSTATIN 100000 [USP'U]/G
POWDER TOPICAL 2 TIMES DAILY PRN
Qty: 60 G | Refills: 4 | Status: SHIPPED | OUTPATIENT
Start: 2023-07-27

## 2023-07-27 RX ORDER — KETOCONAZOLE 20 MG/G
CREAM TOPICAL
Qty: 15 G | Refills: 3 | Status: SHIPPED | OUTPATIENT
Start: 2023-07-27

## 2023-07-27 ASSESSMENT — ENCOUNTER SYMPTOMS
WEAKNESS: 0
NAUSEA: 0
HEMATURIA: 0
HEARTBURN: 0
NERVOUS/ANXIOUS: 0
FREQUENCY: 0
COUGH: 0
EYE PAIN: 0
BREAST MASS: 0
MYALGIAS: 0
SHORTNESS OF BREATH: 0
DIZZINESS: 0
FEVER: 0
CONSTIPATION: 0
DIARRHEA: 0
JOINT SWELLING: 0
ABDOMINAL PAIN: 0
ARTHRALGIAS: 0
PALPITATIONS: 0
HEMATOCHEZIA: 0
HEADACHES: 0
CHILLS: 0
SORE THROAT: 0
DYSURIA: 0
PARESTHESIAS: 0

## 2023-07-27 ASSESSMENT — PATIENT HEALTH QUESTIONNAIRE - PHQ9
SUM OF ALL RESPONSES TO PHQ QUESTIONS 1-9: 2
10. IF YOU CHECKED OFF ANY PROBLEMS, HOW DIFFICULT HAVE THESE PROBLEMS MADE IT FOR YOU TO DO YOUR WORK, TAKE CARE OF THINGS AT HOME, OR GET ALONG WITH OTHER PEOPLE: NOT DIFFICULT AT ALL
SUM OF ALL RESPONSES TO PHQ QUESTIONS 1-9: 2

## 2023-07-27 ASSESSMENT — PAIN SCALES - GENERAL: PAINLEVEL: NO PAIN (0)

## 2023-07-27 NOTE — LETTER
July 27, 2023      Liudmila Donahue  [address]  [place] [address]        Dear ,    We are writing to inform you of your test results.    Is very nice to meet you today.  Your laboratory results are below.  Overall everything looks great.  Blood counts and everything are normal.  Hemoglobin A1c indicates good control at 7.0.  I do still want to make the medication changes that we discussed today in the clinic in hopes of reducing your risk of hypoglycemia.  Please let me know if there is any issues with filling your medications AKA prior Auth issues.  If you hear from your insurance which injectable medication they would like you on please let me know.    Resulted Orders   Albumin Random Urine Quantitative with Creat Ratio   Result Value Ref Range    Creatinine Urine mg/dL 27.0 mg/dL      Comment:      The reference ranges have not been established in urine creatinine. The results should be integrated into the clinical context for interpretation.    Albumin Urine mg/L <12.0 mg/L      Comment:      The reference ranges have not been established in urine albumin. The results should be integrated into the clinical context for interpretation.    Albumin Urine mg/g Cr        Comment:      Unable to calculate, urine albumin and/or urine creatinine is outside detectable limits.  Microalbuminuria is defined as an albumin:creatinine ratio of 17 to 299 for males and 25 to 299 for females. A ratio of albumin:creatinine of 300 or higher is indicative of overt proteinuria.  Due to biologic variability, positive results should be confirmed by a second, first-morning random or 24-hour timed urine specimen. If there is discrepancy, a third specimen is recommended. When 2 out of 3 results are in the microalbuminuria range, this is evidence for incipient nephropathy and warrants increased efforts at glucose control, blood pressure control, and institution of therapy with an angiotensin-converting-enzyme (ACE) inhibitor  (if the patient can tolerate it).     Comprehensive Metabolic Panel   Result Value Ref Range    Sodium 142 136 - 145 mmol/L    Potassium 4.1 3.4 - 5.3 mmol/L    Chloride 107 98 - 107 mmol/L    Carbon Dioxide (CO2) 27 22 - 29 mmol/L    Anion Gap 8 7 - 15 mmol/L    Urea Nitrogen 17.9 6.0 - 20.0 mg/dL    Creatinine 0.82 0.51 - 0.95 mg/dL    Calcium 9.3 8.6 - 10.0 mg/dL    Glucose 84 70 - 99 mg/dL    Alkaline Phosphatase 84 35 - 104 U/L    AST 29 0 - 45 U/L      Comment:      Reference intervals for this test were updated on 6/12/2023 to more accurately reflect our healthy population. There may be differences in the flagging of prior results with similar values performed with this method. Interpretation of those prior results can be made in the context of the updated reference intervals.    ALT 47 0 - 50 U/L      Comment:      Reference intervals for this test were updated on 6/12/2023 to more accurately reflect our healthy population. There may be differences in the flagging of prior results with similar values performed with this method. Interpretation of those prior results can be made in the context of the updated reference intervals.      Protein Total 7.3 6.4 - 8.3 g/dL    Albumin 4.6 3.5 - 5.2 g/dL    Bilirubin Total 0.6 <=1.2 mg/dL    GFR Estimate 83 >60 mL/min/1.73m2   Hemoglobin A1c   Result Value Ref Range    Hemoglobin A1C 7.0 (H) 4.0 - 6.2 %   CBC W PLT No Diff   Result Value Ref Range    WBC Count 9.0 4.0 - 11.0 10e3/uL    RBC Count 4.43 3.80 - 5.20 10e6/uL    Hemoglobin 13.4 11.7 - 15.7 g/dL    Hematocrit 39.8 35.0 - 47.0 %    MCV 90 78 - 100 fL    MCH 30.2 26.5 - 33.0 pg    MCHC 33.7 31.5 - 36.5 g/dL    RDW 12.6 10.0 - 15.0 %    Platelet Count 256 150 - 450 10e3/uL   Lipid Panel   Result Value Ref Range    Cholesterol 125 <200 mg/dL    Triglycerides 87 <150 mg/dL    Direct Measure HDL 43 (L) >=50 mg/dL    LDL Cholesterol Calculated 65 <=100 mg/dL    Non HDL Cholesterol 82 <130 mg/dL    Narrative     Cholesterol  Desirable:  <200 mg/dL    Triglycerides  Normal:  Less than 150 mg/dL  Borderline High:  150-199 mg/dL  High:  200-499 mg/dL  Very High:  Greater than or equal to 500 mg/dL    Direct Measure HDL  Female:  Greater than or equal to 50 mg/dL   Male:  Greater than or equal to 40 mg/dL    LDL Cholesterol  Desirable:  <100mg/dL  Above Desirable:  100-129 mg/dL   Borderline High:  130-159 mg/dL   High:  160-189 mg/dL   Very High:  >= 190 mg/dL    Non HDL Cholesterol  Desirable:  130 mg/dL  Above Desirable:  130-159 mg/dL  Borderline High:  160-189 mg/dL  High:  190-219 mg/dL  Very High:  Greater than or equal to 220 mg/dL       If you have any questions or concerns, please call the clinic at the number listed above.       Sincerely,      Ronal Vieira MD

## 2023-07-27 NOTE — PROGRESS NOTES
SUBJECTIVE:   CC: Liudmila is an 57 year old who presents for preventive health visit.       Healthy Habits:     Getting at least 3 servings of Calcium per day:  NO    Bi-annual eye exam:  Yes    Dental care twice a year:  NO    Sleep apnea or symptoms of sleep apnea:  None    Diet:  Diabetic    Frequency of exercise:  None    Taking medications regularly:  Yes      Today's PHQ-9 Score:       2023     8:30 AM   PHQ-9 SCORE   PHQ-9 Total Score MyChart 2 (Minimal depression)   PHQ-9 Total Score 2             Due for colonoscopy in 5 years.   Great grandmother with colon cancer.    S/p hysterectomy and cervix removed.    Discussed diabetes management.  Blood sugars have been variable.        Have you ever done Advance Care Planning? (For example, a Health Directive, POLST, or a discussion with a medical provider or your loved ones about your wishes): No, advance care planning information given to patient to review.  Patient plans to discuss their wishes with loved ones or provider.      Social History     Tobacco Use    Smoking status: Former     Types: Cigarettes     Quit date: 2002     Years since quittin.4    Smokeless tobacco: Never   Substance Use Topics    Alcohol use: Yes     Comment: occasional             2023     8:33 AM   Alcohol Use   Prescreen: >3 drinks/day or >7 drinks/week? No     Reviewed orders with patient.  Reviewed health maintenance and updated orders accordingly - Yes  Lab work is in process  Labs reviewed in EPIC    Breast Cancer Screening:  Any new diagnosis of family breast, ovarian, or bowel cancer?     FHS-7:       2022     8:47 AM 2023    10:59 AM   Breast CA Risk Assessment (FHS-7)   Did any of your first-degree relatives have breast or ovarian cancer? No No   Did any of your relatives have bilateral breast cancer? No No   Did any man in your family have breast cancer? No No   Did any woman in your family have breast and ovarian cancer? No No   Did any woman in  your family have breast cancer before age 50 y? No No   Do you have 2 or more relatives with breast and/or ovarian cancer? No No   Do you have 2 or more relatives with breast and/or bowel cancer? No No         Pertinent mammograms are reviewed under the imaging tab.    History of abnormal Pap smear: No.  Status post hysterectomy and cervix removed.     Reviewed and updated as needed this visit by clinical staff   Tobacco  Allergies  Meds      Soc Hx        Reviewed and updated as needed this visit by Provider                     Review of Systems   Constitutional:  Negative for chills and fever.   HENT:  Negative for congestion, ear pain, hearing loss and sore throat.    Eyes:  Negative for pain and visual disturbance.   Respiratory:  Negative for cough and shortness of breath.    Cardiovascular:  Negative for chest pain, palpitations and peripheral edema.   Gastrointestinal:  Negative for abdominal pain, constipation, diarrhea, heartburn, hematochezia and nausea.   Breasts:  Negative for tenderness, breast mass and discharge.   Genitourinary:  Negative for dysuria, frequency, genital sores, hematuria, pelvic pain, urgency, vaginal bleeding and vaginal discharge.   Musculoskeletal:  Negative for arthralgias, joint swelling and myalgias.   Skin:  Negative for rash.   Neurological:  Negative for dizziness, weakness, headaches and paresthesias.   Psychiatric/Behavioral:  Negative for mood changes. The patient is not nervous/anxious.      CONSTITUTIONAL: NEGATIVE for fever, chills, change in weight  INTEGUMENTARY/SKIN: NEGATIVE for worrisome rashes, moles or lesions  EYES: NEGATIVE for vision changes or irritation  ENT: NEGATIVE for ear, mouth and throat problems  RESP: NEGATIVE for significant cough or SOB  BREAST: NEGATIVE for masses, tenderness or discharge  CV: NEGATIVE for chest pain, palpitations or peripheral edema  GI: NEGATIVE for nausea, abdominal pain, heartburn, or change in bowel habits  : NEGATIVE for  "unusual urinary or vaginal symptoms. No vaginal bleeding.  MUSCULOSKELETAL: NEGATIVE for significant arthralgias or myalgia  NEURO: NEGATIVE for weakness, dizziness or paresthesias  PSYCHIATRIC: NEGATIVE for changes in mood or affect      OBJECTIVE:   /72 (BP Location: Right arm, Patient Position: Sitting, Cuff Size: Adult Large)   Pulse 62   Temp 97.1  F (36.2  C) (Temporal)   Resp 16   Ht 1.651 m (5' 5\")   Wt 94.6 kg (208 lb 9.6 oz)   LMP 07/27/2006 (Approximate)   SpO2 96%   BMI 34.71 kg/m    Physical Exam  Vitals and nursing note reviewed.   Constitutional:       General: She is not in acute distress.     Appearance: She is well-developed. She is obese. She is not diaphoretic.   HENT:      Head: Normocephalic and atraumatic.      Right Ear: Tympanic membrane, ear canal and external ear normal. There is no impacted cerumen.      Left Ear: Tympanic membrane, ear canal and external ear normal. There is no impacted cerumen.      Mouth/Throat:      Mouth: Mucous membranes are moist.      Pharynx: Oropharynx is clear.   Eyes:      General: No scleral icterus.     Conjunctiva/sclera: Conjunctivae normal.   Cardiovascular:      Rate and Rhythm: Normal rate and regular rhythm.   Pulmonary:      Effort: Pulmonary effort is normal. No respiratory distress.      Breath sounds: Normal breath sounds.   Abdominal:      General: Bowel sounds are normal.      Palpations: Abdomen is soft.      Tenderness: There is no abdominal tenderness.   Musculoskeletal:         General: No deformity.      Cervical back: Neck supple.   Lymphadenopathy:      Cervical: No cervical adenopathy.   Skin:     General: Skin is warm and dry.      Coloration: Skin is not jaundiced.      Findings: No rash.      Comments: No atypical nevi   Neurological:      General: No focal deficit present.      Mental Status: She is alert. Mental status is at baseline.   Psychiatric:         Mood and Affect: Mood normal.         Behavior: Behavior " normal.           Diagnostic Test Results:  Labs reviewed in Epic    ASSESSMENT/PLAN:       ICD-10-CM    1. Encounter for annual physical exam  Z00.00 Comprehensive Metabolic Panel     CBC W PLT No Diff     Comprehensive Metabolic Panel     CBC W PLT No Diff      2. Rash and nonspecific skin eruption  R21 ketoconazole (NIZORAL) 2 % external cream     nystatin (MYCOSTATIN) 453372 UNIT/GM external powder      3. Type 2 diabetes mellitus without complication, without long-term current use of insulin (H)  E11.9 empagliflozin (JARDIANCE) 25 MG TABS tablet     Dulaglutide (TRULICITY) 3 MG/0.5ML SOPN     Continuous Blood Gluc  (DEXCOM G7 ) CHASTITY     Continuous Blood Gluc Sensor (DEXCOM G7 SENSOR) MISC     Hemoglobin A1c     AMB Adult Diabetes Educator Referral     Albumin Random Urine Quantitative with Creat Ratio     Hemoglobin A1c     CANCELED: Albumin Random Urine Quantitative with Creat Ratio      4. Hyperlipidemia LDL goal <100  E78.5 atorvastatin (LIPITOR) 20 MG tablet     Lipid Panel     Lipid Panel      5. Dysthymic disorder  F34.1 citalopram (CELEXA) 20 MG tablet      6. Essential hypertension  I10 lisinopril (ZESTRIL) 20 MG tablet      7. Gastroesophageal reflux disease without esophagitis  K21.9 omeprazole (PRILOSEC) 20 MG DR capsule      8. Morbid obesity (H)  E66.01         Annual physical exam: Updated past medical history surgical history social history and medications.  She is due for colonoscopy in 2025, has had a hysterectomy with cervix removed, mammogram this year was negative.  We will obtain basic screening labs as above.  Declined pneumococcal vaccination otherwise up-to-date.    Type 2 diabetes: Her insurance is stating that all GLP-1's need prior Auth.  Has previously been on Victoza now on Trulicity and tolerating well.  Would like her to increase to 3 mg weekly.  I appreciate insurance's efforts to save money and prevent her from having adequate control of her diabetes.  We will  "submit Trulicity 3 mg weekly for prior authorization.  Stop glipizide, increase Jardiance to 25 mg daily in hopes of keeping A1c under control without the risk of low blood sugars.    Patient was also prescribed a Dexcom G7 if not covered through [employer] will send to [facility].  She was given a sample to try in clinic today.  She will send me the results of her next eye exam.  Will refer to diabetes education for review of diabetes management.      Morbid obesity: BMI 34.71 contributes to her diabetes    Hypertension: Blood pressure under good control, continue lisinopril 20 mg daily    Hyperlipidemia recheck lipids today, continue atorvastatin 20 mg daily.  Patient has been advised of split billing requirements and indicates understanding: Yes      COUNSELING:  Reviewed preventive health counseling, as reflected in patient instructions      BMI:   Estimated body mass index is 34.71 kg/m  as calculated from the following:    Height as of this encounter: 1.651 m (5' 5\").    Weight as of this encounter: 94.6 kg (208 lb 9.6 oz).   Weight management plan: Discussed healthy diet and exercise guidelines      She reports that she quit smoking about 21 years ago. Her smoking use included cigarettes. She has never used smokeless tobacco.        Ronal Vieira MD  [facility] AND HOSPITALAnswers submitted by the patient for this visit:  Patient Health Questionnaire (Submitted on 7/27/2023)  If you checked off any problems, how difficult have these problems made it for you to do your work, take care of things at home, or get along with other people?: Not difficult at all  PHQ9 TOTAL SCORE: 2    "

## 2023-07-27 NOTE — PROGRESS NOTES
"Chief Complaint   Patient presents with    Physical     Est care       Initial /72 (BP Location: Right arm, Patient Position: Sitting, Cuff Size: Adult Large)   Pulse 62   Temp 97.1  F (36.2  C) (Temporal)   Resp 16   Ht 1.651 m (5' 5\")   Wt 94.6 kg (208 lb 9.6 oz)   LMP 07/27/2006 (Approximate)   SpO2 96%   BMI 34.71 kg/m   Estimated body mass index is 34.71 kg/m  as calculated from the following:    Height as of this encounter: 1.651 m (5' 5\").    Weight as of this encounter: 94.6 kg (208 lb 9.6 oz).  Medication Reconciliation: complete      FOOD SECURITY SCREENING QUESTIONS:    The next two questions are to help us understand your food security.  If you are feeling you need any assistance in this area, we have resources available to support you today.    Hunger Vital Signs:  Within the past 12 months we worried whether our food would run out before we got money to buy more. Never  Within the past 12 months the food we bought just didn't last and we didn't have money to get more. Never            Anne-Marie Dudley LPN on 7/27/2023 at 8:53 AM       Answers submitted by the patient for this visit:  Patient Health Questionnaire (Submitted on 7/27/2023)  If you checked off any problems, how difficult have these problems made it for you to do your work, take care of things at home, or get along with other people?: Not difficult at all  PHQ9 TOTAL SCORE: 2  Annual Preventive Visit (Submitted on 7/27/2023)  Chief Complaint: Annual Exam:  Frequency of exercise:: None  Getting at least 3 servings of Calcium per day:: NO  Diet:: Diabetic  Taking medications regularly:: Yes  Bi-annual eye exam:: Yes  Dental care twice a year:: NO  Sleep apnea or symptoms of sleep apnea:: None  abdominal pain: No  Blood in stool: No  Blood in urine: No  chest pain: No  chills: No  congestion: No  constipation: No  cough: No  diarrhea: No  dizziness: No  ear pain: No  eye pain: No  nervous/anxious: No  fever: No  frequency: " No  genital sores: No  headaches: No  hearing loss: No  heartburn: No  arthralgias: No  joint swelling: No  peripheral edema: No  mood changes: No  myalgias: No  nausea: No  dysuria: No  palpitations: No  Skin sensation changes: No  sore throat: No  urgency: No  rash: No  shortness of breath: No  visual disturbance: No  weakness: No  pelvic pain: No  vaginal bleeding: No  vaginal discharge: No  tenderness: No  breast mass: No  breast discharge: No

## 2023-07-28 ENCOUNTER — TELEPHONE (OUTPATIENT)
Dept: INTERNAL MEDICINE | Facility: OTHER | Age: 58
End: 2023-07-28
Payer: COMMERCIAL

## 2023-07-28 DIAGNOSIS — K21.9 GASTROESOPHAGEAL REFLUX DISEASE WITHOUT ESOPHAGITIS: ICD-10-CM

## 2023-07-28 NOTE — TELEPHONE ENCOUNTER
Message from pharmacy:  2 sets of directions. Please correct.     Callie Blanchard RN .............. 7/28/2023  10:51 AM

## 2023-07-28 NOTE — TELEPHONE ENCOUNTER
Simon, pharmacist from [facility], called and needs clarification on the prescription for omeprazole.     Yi Hdz on 7/28/2023 at 2:48 PM

## 2023-07-28 NOTE — TELEPHONE ENCOUNTER
The order description states that TAKE ONE CAPSULE BY MOUTH ONCE DAILY BEFORE A MEAL -oral.  Needs clarification on order per pharmacy.  .Sid Segovia LPN on 7/28/2023 at 2:56 PM

## 2023-08-25 DIAGNOSIS — K21.9 GASTROESOPHAGEAL REFLUX DISEASE WITHOUT ESOPHAGITIS: Primary | ICD-10-CM

## 2023-08-25 RX ORDER — OMEPRAZOLE 40 MG/1
40 CAPSULE, DELAYED RELEASE ORAL DAILY
Qty: 90 CAPSULE | Refills: 4 | Status: SHIPPED | OUTPATIENT
Start: 2023-08-25 | End: 2024-02-16

## 2023-09-16 ENCOUNTER — MYC MEDICAL ADVICE (OUTPATIENT)
Dept: INTERNAL MEDICINE | Facility: OTHER | Age: 58
End: 2023-09-16
Payer: COMMERCIAL

## 2023-09-16 DIAGNOSIS — E11.9 TYPE 2 DIABETES MELLITUS WITHOUT COMPLICATION, WITHOUT LONG-TERM CURRENT USE OF INSULIN (H): ICD-10-CM

## 2023-09-18 RX ORDER — DULAGLUTIDE 4.5 MG/.5ML
4.5 INJECTION, SOLUTION SUBCUTANEOUS WEEKLY
Qty: 6 ML | Refills: 4 | Status: SHIPPED | OUTPATIENT
Start: 2023-09-18 | End: 2024-01-12

## 2023-09-18 NOTE — TELEPHONE ENCOUNTER
Response patient MyChart message discussed with her over the phone.  Blood sugars have been persistently above 200.  She is no longer taking glipizide as we discussed in our most recent clinic visit.  Increase her Trulicity to 4.5 mg weekly.  If still hyperglycemic will not need to add glipizide back.    Ronal Vieira MD on 9/18/2023 at 3:34 PM

## 2023-12-08 ENCOUNTER — HOSPITAL ENCOUNTER (EMERGENCY)
Facility: OTHER | Age: 58
Discharge: HOME | End: 2023-12-08
Attending: FAMILY MEDICINE | Admitting: FAMILY MEDICINE
Payer: COMMERCIAL

## 2023-12-08 ENCOUNTER — APPOINTMENT (OUTPATIENT)
Dept: GENERAL RADIOLOGY | Facility: OTHER | Age: 58
End: 2023-12-08
Attending: FAMILY MEDICINE
Payer: COMMERCIAL

## 2023-12-08 VITALS
OXYGEN SATURATION: 98 % | BODY MASS INDEX: 34.15 KG/M2 | HEIGHT: 64 IN | DIASTOLIC BLOOD PRESSURE: 74 MMHG | WEIGHT: 200 LBS | RESPIRATION RATE: 18 BRPM | HEART RATE: 75 BPM | SYSTOLIC BLOOD PRESSURE: 138 MMHG

## 2023-12-08 DIAGNOSIS — M54.6 ACUTE LEFT-SIDED THORACIC BACK PAIN: ICD-10-CM

## 2023-12-08 PROCEDURE — 99283 EMERGENCY DEPT VISIT LOW MDM: CPT | Mod: 25 | Performed by: FAMILY MEDICINE

## 2023-12-08 PROCEDURE — 250N000013 HC RX MED GY IP 250 OP 250 PS 637: Performed by: FAMILY MEDICINE

## 2023-12-08 PROCEDURE — 99283 EMERGENCY DEPT VISIT LOW MDM: CPT | Performed by: FAMILY MEDICINE

## 2023-12-08 PROCEDURE — 71045 X-RAY EXAM CHEST 1 VIEW: CPT | Mod: TC

## 2023-12-08 RX ORDER — HYDROCODONE BITARTRATE AND ACETAMINOPHEN 5; 325 MG/1; MG/1
1 TABLET ORAL EVERY 6 HOURS PRN
Qty: 6 TABLET | Refills: 0 | Status: SHIPPED | OUTPATIENT
Start: 2023-12-08 | End: 2024-02-16

## 2023-12-08 RX ORDER — HYDROCODONE BITARTRATE AND ACETAMINOPHEN 5; 325 MG/1; MG/1
1 TABLET ORAL ONCE
Status: COMPLETED | OUTPATIENT
Start: 2023-12-08 | End: 2023-12-08

## 2023-12-08 RX ADMIN — HYDROCODONE BITARTRATE AND ACETAMINOPHEN 1 TABLET: 5; 325 TABLET ORAL at 22:53

## 2023-12-08 ASSESSMENT — ENCOUNTER SYMPTOMS: BACK PAIN: 1

## 2023-12-08 ASSESSMENT — ACTIVITIES OF DAILY LIVING (ADL): ADLS_ACUITY_SCORE: 36

## 2023-12-09 NOTE — ED TRIAGE NOTES
Pt here for back pain.  Pt woke up with pain in her left back area and pt reports spasms.  Pt states she is not sure if one of her ribs is popped out.  Pt reports the pain is not getting any better and could not willian into her chiropractor today for an adjustment.      Triage Assessment (Adult)       Row Name 12/08/23 2003          Triage Assessment    Airway WDL WDL        Respiratory WDL    Respiratory WDL WDL        Peripheral/Neurovascular WDL    Peripheral Neurovascular WDL WDL        Cognitive/Neuro/Behavioral WDL    Cognitive/Neuro/Behavioral WDL WDL

## 2023-12-09 NOTE — DISCHARGE INSTRUCTIONS
Jackie    I am going to recommend Vicodin for pain. Do not drive or operate machinery for six hours after taking Vicodin. This medicine will make you more likely to fall so please be careful.  Do not use firearms for six hours after taking Vicodin.     Thank you for choosing our Emergency Department for your care.     You may receive a phone call or letter for a survey about your care in our ED.  Please complete this as this is how we improve care for our patients.     If you have any questions after leaving the ED you can call or text me on my cell phone at [phone].  This does not mean that I am on call, but I will get back to you.  If you are not doing well please return to the ED.     Sincerely,    Dr Andres Wylie M.D.

## 2023-12-09 NOTE — ED PROVIDER NOTES
History     Chief Complaint   Patient presents with    Back Pain     The history is provided by the patient and medical records.     Jackie Donahue is a 58 year old female here with back pain. Onset was last night and it has been persistent. She has been taking ibuprofen all day, last dose was 800 mg at 8 PM this evening. No fall or injury. She was carrying her 1 1/1 yo granddaughter last night but that is not unusual for her.  Jackie feels she has a rib out and wants to see chiropractic.     She has type 2 DM with peripheral neuropathy. No history of back pain or surgery.     Allergies:  Allergies   Allergen Reactions    Metronidazole Anaphylaxis    Celecoxib Unknown    Metformin Diarrhea       Problem List:    Patient Active Problem List    Diagnosis Date Noted    Morbid obesity (H) 07/27/2023     Priority: Medium    H/O adenomatous polyp of colon 10/12/2020     Priority: Medium    Healthcare maintenance 10/09/2020     Priority: Medium    Hyperlipidemia LDL goal <100 10/02/2018     Priority: Medium    Dysthymic disorder 01/17/2018     Priority: Medium    Gastroesophageal reflux disease 01/17/2018     Priority: Medium    Diabetic peripheral neuropathy associated with type 2 diabetes mellitus (H) 07/19/2016     Priority: Medium    Type 2 diabetes mellitus without complication (H) 03/14/2016     Priority: Medium    Hypertension 11/06/2014     Priority: Medium    Overweight 05/16/2012     Priority: Medium        Past Medical History:    Past Medical History:   Diagnosis Date    Adenomatous colon polyp     Essential (primary) hypertension     Gastro-esophageal reflux disease without esophagitis     Generalized anxiety disorder     Major depressive disorder, single episode     Type 2 diabetes mellitus without complications (H)        Past Surgical History:    Past Surgical History:   Procedure Laterality Date    BREAST BIOPSY, RT/LT Left 02/2019    CHOLECYSTECTOMY      No Comments Provided    COLONOSCOPY   2010,Normal, neg. for occult colitis, due     COLONOSCOPY  10/12/2020    F/U  tubular adenoma    DILATION AND CURETTAGE      ,Also endometrial ablation    ESOPHAGOSCOPY, GASTROSCOPY, DUODENOSCOPY (EGD), COMBINED      ,Reactive gastropathy, mild to moderate refulux changes.    ESOPHAGOSCOPY, GASTROSCOPY, DUODENOSCOPY (EGD), COMBINED N/A 2023    normal    HYSTERECTOMY TOTAL ABDOMINAL      ,LAVH/LSO    LAMINECT/DISCECTOMY, CERVICAL  2008    LAPAROSCOPIC TUBAL LIGATION      No Comments Provided    LAPAROSCOPY DIAGNOSTIC (GYN)      ,diagnostic laparoscopy With lysis of adhesions    RELEASE CARPAL TUNNEL Bilateral        Family History:    Family History   Problem Relation Age of Onset    Other - See Comments Mother         Psychiatric illness,Some type of mental illness.    Lung Cancer Mother     Diabetes Father     Hypertension Father     Cancer Father         Urothelial cancer    Diabetes Maternal Grandmother         Diabetes    Diabetes Maternal Grandfather         Diabetes    Diabetes Paternal Grandmother         Diabetes    Diabetes Paternal Grandfather         Diabetes    Other - See Comments Other         Hysterectomy for prolapse.    Other - See Comments Other         Psychiatric illness,Bipolar disease.    Hypertension Brother     Coronary Artery Disease Brother         Stents    Diabetes Brother     Pancreatitis Brother        Social History:  Marital Status:   [2]  Social History     Tobacco Use    Smoking status: Former     Types: Cigarettes     Quit date: 2002     Years since quittin.7    Smokeless tobacco: Never   Vaping Use    Vaping Use: Never used   Substance Use Topics    Alcohol use: Yes     Comment: occasional    Drug use: No     Comment: Drug use: No        Medications:    HYDROcodone-acetaminophen (NORCO) 5-325 MG tablet  atorvastatin (LIPITOR) 20 MG tablet  cholecalciferol (VITAMIN D3) 1000 UNIT tablet  citalopram (CELEXA) 20 MG  "tablet  conjugated estrogens (PREMARIN) 0.625 MG/GM vaginal cream  Continuous Blood Gluc Sensor (DEXCOM G7 SENSOR) MISC  CONTOUR NEXT TEST test strip  Dulaglutide (TRULICITY) 4.5 MG/0.5ML SOPN  empagliflozin (JARDIANCE) 25 MG TABS tablet  ketoconazole (NIZORAL) 2 % external cream  lisinopril (ZESTRIL) 20 MG tablet  Microlet Lancets MISC  nystatin (MYCOSTATIN) 541353 UNIT/GM external powder  omeprazole (PRILOSEC) 20 MG DR capsule  omeprazole (PRILOSEC) 40 MG DR capsule      Review of Systems   Musculoskeletal:  Positive for back pain.   All other systems reviewed and are negative.      Physical Exam   BP: 138/74  Pulse: 75  Resp: 18  Height: 162.6 cm (5' 4\")  Weight: 90.7 kg (200 lb)  SpO2: 98 %      Physical Exam  Vitals and nursing note reviewed.   Constitutional:       General: She is in acute distress.   Pulmonary:      Effort: Pulmonary effort is normal. No respiratory distress.   Musculoskeletal:      Comments: Her area of tenderness is over the paraspinous muscles to the left of the T spine at about T 10.  She has some radiation of pain around the left side of the chest wall in a dermatomal pattern.   Skin:     General: Skin is warm and dry.      Findings: No erythema or rash.      Comments: No tenderness to light touch.    Neurological:      Mental Status: She is alert.       Results for orders placed or performed during the hospital encounter of 12/08/23 (from the past 24 hour(s))   XR Chest Port 1 View    Narrative    PROCEDURE INFORMATION:   Exam: XR Chest   Exam date and time: 12/8/2023 10:48 PM   Age: 58 years old   Clinical indication: Chest wall pain; Additional info: Back pain left of   midline at about T10     TECHNIQUE:   Imaging protocol: Radiologic exam of the chest.   Views: 1 view.     COMPARISON:   CR XR CHEST 1 VIEW PORTABLE 1/15/2016 4:16 PM     FINDINGS:   Lungs: The lungs are well expanded, and clear. No focal consolidation.   Pleural spaces: No large pleural effusion. No pneumothorax. " "  Heart/Mediastinum: The cardiomediastinal silhouette is normal in size and   contour.    Bones/joints: Cervical spinal hardware. Mild osseous degenerative change.       Impression    IMPRESSION:   No acute intrathoracic abnormality.     THIS DOCUMENT HAS BEEN ELECTRONICALLY SIGNED BY NAVA TAYLOR MD       Medications   HYDROcodone-acetaminophen (NORCO) 5-325 MG per tablet 1 tablet (1 tablet Oral $Given 12/8/23 9692)       Assessments & Plan (with Medical Decision Making)  Jackie Donahue is a 58 year old female here with back pain. Onset was last night and it has been persistent. She has been taking ibuprofen all day, last dose was 800 mg at 8 PM this evening. No fall or injury. She was carrying her 1 1/3 yo granddaughter last night but that is not unusual for her.  Jackie feels she has a rib out and wants to see chiropractic.   She has type 2 DM with peripheral neuropathy. No history of back pain or surgery.   VS in the ED /74   Pulse 75   Resp 18   Ht 1.626 m (5' 4\")   Wt 90.7 kg (200 lb)   LMP 07/27/2006 (Approximate)   SpO2 98%   BMI 34.33 kg/m    Exam shows tenderness to the left of the T spine at about T 10, no rash and no tenderness to light touch.  I think shingles is unlikely.  We gave Vicodin.   Xrays normal.   11:42 PM  She is feeling better. We will get her home with Vicodin  for pain.      I have reviewed the nursing notes.    I have reviewed the findings, diagnosis, plan and need for follow up with the patient.  Medical Decision Making  The patient's presentation was of low complexity (an acute and uncomplicated illness or injury).    The patient's evaluation involved:  an assessment requiring an independent historian (see separate area of note for details)  ordering and/or review of 1 test(s) in this encounter (see separate area of note for details)    The patient's management necessitated moderate risk (prescription drug management including medications given in the ED).      New " Prescriptions    HYDROCODONE-ACETAMINOPHEN (NORCO) 5-325 MG TABLET    Take 1 tablet by mouth every 6 hours as needed for severe pain       Final diagnoses:   Acute left-sided thoracic back pain       12/8/2023   [facility] AND [facility], Keith Sena MD  12/08/23 1545

## 2023-12-13 DIAGNOSIS — E11.9 TYPE 2 DIABETES MELLITUS WITHOUT COMPLICATION (H): ICD-10-CM

## 2023-12-13 NOTE — TELEPHONE ENCOUNTER
[id number] sent Rx request for the following:      Requested Prescriptions   Pending Prescriptions Disp Refills    blood glucose (CONTOUR NEXT TEST) test strip 100 strip 4     Sig: USE  STRIP TO CHECK GLUCOSE TWICE DAILY   Last Prescription Date:   11/15/22  Last Fill Qty/Refills:         100, R-4    Last Office Visit:              7/27/23   Future Office visit:           None    Prescription approved per [facility] Refill Protocol.    Callie Blanchard RN .............. 12/13/2023  10:44 AM

## 2024-01-12 ENCOUNTER — OFFICE VISIT (OUTPATIENT)
Dept: INTERNAL MEDICINE | Facility: OTHER | Age: 59
End: 2024-01-12
Attending: STUDENT IN AN ORGANIZED HEALTH CARE EDUCATION/TRAINING PROGRAM
Payer: COMMERCIAL

## 2024-01-12 VITALS
HEIGHT: 64 IN | HEART RATE: 81 BPM | SYSTOLIC BLOOD PRESSURE: 134 MMHG | BODY MASS INDEX: 33.7 KG/M2 | RESPIRATION RATE: 16 BRPM | OXYGEN SATURATION: 96 % | WEIGHT: 197.4 LBS | DIASTOLIC BLOOD PRESSURE: 84 MMHG | TEMPERATURE: 98.4 F

## 2024-01-12 DIAGNOSIS — E11.9 TYPE 2 DIABETES MELLITUS WITHOUT COMPLICATION, WITHOUT LONG-TERM CURRENT USE OF INSULIN (H): Primary | ICD-10-CM

## 2024-01-12 DIAGNOSIS — E66.01 MORBID OBESITY (H): ICD-10-CM

## 2024-01-12 DIAGNOSIS — I10 HYPERTENSION, UNSPECIFIED TYPE: ICD-10-CM

## 2024-01-12 LAB — HBA1C MFR BLD: 7.5 % (ref 4–6.2)

## 2024-01-12 PROCEDURE — 90677 PCV20 VACCINE IM: CPT | Performed by: STUDENT IN AN ORGANIZED HEALTH CARE EDUCATION/TRAINING PROGRAM

## 2024-01-12 PROCEDURE — 99213 OFFICE O/P EST LOW 20 MIN: CPT | Mod: 25 | Performed by: STUDENT IN AN ORGANIZED HEALTH CARE EDUCATION/TRAINING PROGRAM

## 2024-01-12 PROCEDURE — 83036 HEMOGLOBIN GLYCOSYLATED A1C: CPT | Mod: ZL | Performed by: STUDENT IN AN ORGANIZED HEALTH CARE EDUCATION/TRAINING PROGRAM

## 2024-01-12 PROCEDURE — 36415 COLL VENOUS BLD VENIPUNCTURE: CPT | Mod: ZL | Performed by: STUDENT IN AN ORGANIZED HEALTH CARE EDUCATION/TRAINING PROGRAM

## 2024-01-12 PROCEDURE — 90471 IMMUNIZATION ADMIN: CPT | Performed by: STUDENT IN AN ORGANIZED HEALTH CARE EDUCATION/TRAINING PROGRAM

## 2024-01-12 RX ORDER — METFORMIN HCL 500 MG
500 TABLET, EXTENDED RELEASE 24 HR ORAL
Qty: 90 TABLET | Refills: 4 | Status: SHIPPED | OUTPATIENT
Start: 2024-01-12

## 2024-01-12 RX ORDER — DULAGLUTIDE 4.5 MG/.5ML
4.5 INJECTION, SOLUTION SUBCUTANEOUS WEEKLY
Qty: 6 ML | Refills: 11 | Status: SHIPPED | OUTPATIENT
Start: 2024-01-12

## 2024-01-12 ASSESSMENT — PAIN SCALES - GENERAL: PAINLEVEL: NO PAIN (0)

## 2024-01-12 NOTE — NURSING NOTE
"Chief Complaint   Patient presents with    Diabetes       Initial /84   Pulse 81   Temp 98.4  F (36.9  C) (Temporal)   Resp 16   Ht 1.626 m (5' 4\")   Wt 89.5 kg (197 lb 6.4 oz)   LMP 07/27/2006 (Approximate)   SpO2 96%   Breastfeeding No   BMI 33.88 kg/m   Estimated body mass index is 33.88 kg/m  as calculated from the following:    Height as of this encounter: 1.626 m (5' 4\").    Weight as of this encounter: 89.5 kg (197 lb 6.4 oz).  Medication Review: complete    The next two questions are to help us understand your food security.  If you are feeling you need any assistance in this area, we have resources available to support you today.          1/12/2024   SDOH- Food Insecurity   Within the past 12 months, did you worry that your food would run out before you got money to buy more? N   Within the past 12 months, did the food you bought just not last and you didn t have money to get more? N         Health Care Directive:  Patient does not have a Health Care Directive or Living Will: Discussed advance care planning with patient; however, patient declined at this time.    Lupis Morales LPN      "

## 2024-01-12 NOTE — PROGRESS NOTES
Assessment & Plan         ICD-10-CM    1. Type 2 diabetes mellitus without complication, without long-term current use of insulin (H)  E11.9 Adult Diabetes Education  Referral     Hemoglobin A1c     Dulaglutide (TRULICITY) 4.5 MG/0.5ML SOPN      2. Morbid obesity (H)  E66.01       3. Hypertension, unspecified type  I10         Type II diabetes: Patient would like a referral for diabetes education to continue to work on diet exercise and avoiding certain foods.  She continues to be on max dose Trulicity as well as Jardiance.  Recheck hemoglobin A1c today.  She is probably on a statin and lisinopril.  Blood pressure is under reasonable control today.  Congratulated her on her 17+ pound weight loss.  BMI is still 33.88 and contributing to her diabetes.    If hemoglobin A1c is greater than 7 we will try adding back long-acting metformin even 500 mg once daily to get blood sugars under better control and to see if she can tolerate this despite her diarrhea with short acting metformin in the past.    Prevnar 20 given today in clinic.  Follow-up with me in 3 to 6 months pending A1c result.    No follow-ups on file.    Ronal Vieira MD  [facility]      Brandie Nur is a 58 year old, presenting for the following health issues:  Diabetes      1/12/2024    11:14 AM   Additional Questions   Roomed by Christiano Morales LPN       History of Present Illness       Diabetes:   She presents for follow up of diabetes.  She is checking home blood glucose one time daily.   She checks blood glucose before meals.  Blood glucose is sometimes over 200 and never under 70. She is aware of hypoglycemia symptoms including shakiness.   She is concerned about blood sugar frequently over 200.   She is having excessive thirst.  The patient has had a diabetic eye exam in the last 12 months. Eye exam performed on feb 2023. Location of last eye exam [place] [facility].        She eats 2-3 servings of fruits and  "vegetables daily.She consumes 0 sweetened beverage(s) daily.She exercises with enough effort to increase her heart rate 9 or less minutes per day.    She is taking medications regularly.     Now on trulicity 4.5mg of trulicity.   Also on jardiance.   No low blood sugars.   Blood sugars up to 200 in the mornings.  Has lost 15-20 pounds.   No significant effort has been made to this so she is no longer walking the morning.  States that she is lost it somewhat unexpectedly.  We discussed this is one of the common side effects of her GLP-1 and SGLT2.  She is interested in following up with diabetes education      Review of Systems         Objective    /84   Pulse 81   Temp 98.4  F (36.9  C) (Temporal)   Resp 16   Ht 1.626 m (5' 4\")   Wt 89.5 kg (197 lb 6.4 oz)   LMP 07/27/2006 (Approximate)   SpO2 96%   Breastfeeding No   BMI 33.88 kg/m    Body mass index is 33.88 kg/m .  Physical Exam   General: Pleasant 58-year-old woman sitting clinic no acute distress  Skin/MSK: Diabetic foot exam was performed with intact sensation to monofilament testing. No wounds or callous appreciated.                   "

## 2024-02-16 ENCOUNTER — OFFICE VISIT (OUTPATIENT)
Dept: INTERNAL MEDICINE | Facility: OTHER | Age: 59
End: 2024-02-16
Attending: INTERNAL MEDICINE
Payer: COMMERCIAL

## 2024-02-16 ENCOUNTER — HOSPITAL ENCOUNTER (OUTPATIENT)
Dept: CT IMAGING | Facility: OTHER | Age: 59
Discharge: HOME | End: 2024-02-16
Attending: INTERNAL MEDICINE
Payer: COMMERCIAL

## 2024-02-16 VITALS
HEIGHT: 64 IN | WEIGHT: 193.9 LBS | BODY MASS INDEX: 33.1 KG/M2 | RESPIRATION RATE: 20 BRPM | DIASTOLIC BLOOD PRESSURE: 68 MMHG | OXYGEN SATURATION: 97 % | SYSTOLIC BLOOD PRESSURE: 128 MMHG | HEART RATE: 74 BPM | TEMPERATURE: 99.4 F

## 2024-02-16 DIAGNOSIS — E78.2 MIXED HYPERLIPIDEMIA: ICD-10-CM

## 2024-02-16 DIAGNOSIS — E66.09 CLASS 1 OBESITY DUE TO EXCESS CALORIES WITH SERIOUS COMORBIDITY AND BODY MASS INDEX (BMI) OF 33.0 TO 33.9 IN ADULT: ICD-10-CM

## 2024-02-16 DIAGNOSIS — R10.9 RIGHT FLANK PAIN: Primary | ICD-10-CM

## 2024-02-16 DIAGNOSIS — E66.811 CLASS 1 OBESITY DUE TO EXCESS CALORIES WITH SERIOUS COMORBIDITY AND BODY MASS INDEX (BMI) OF 33.0 TO 33.9 IN ADULT: ICD-10-CM

## 2024-02-16 DIAGNOSIS — Z84.1 FAMILY HISTORY OF KIDNEY STONES: ICD-10-CM

## 2024-02-16 DIAGNOSIS — R10.9 RIGHT FLANK PAIN: ICD-10-CM

## 2024-02-16 PROBLEM — E66.01 MORBID OBESITY (H): Status: RESOLVED | Noted: 2023-07-27 | Resolved: 2024-02-16

## 2024-02-16 LAB
ALBUMIN SERPL BCG-MCNC: 4.6 G/DL (ref 3.5–5.2)
ALBUMIN UR-MCNC: NEGATIVE MG/DL
ALP SERPL-CCNC: 79 U/L (ref 40–150)
ALT SERPL W P-5'-P-CCNC: 41 U/L (ref 0–50)
ANION GAP SERPL CALCULATED.3IONS-SCNC: 7 MMOL/L (ref 7–15)
APPEARANCE UR: CLEAR
AST SERPL W P-5'-P-CCNC: 23 U/L (ref 0–45)
BASOPHILS # BLD AUTO: 0.1 10E3/UL (ref 0–0.2)
BASOPHILS NFR BLD AUTO: 1 %
BILIRUB SERPL-MCNC: 0.5 MG/DL
BILIRUB UR QL STRIP: NEGATIVE
BUN SERPL-MCNC: 12.4 MG/DL (ref 6–20)
CALCIUM SERPL-MCNC: 9.6 MG/DL (ref 8.6–10)
CHLORIDE SERPL-SCNC: 106 MMOL/L (ref 98–107)
COLOR UR AUTO: YELLOW
CREAT SERPL-MCNC: 0.76 MG/DL (ref 0.51–0.95)
CRP SERPL-MCNC: <3 MG/L
DEPRECATED HCO3 PLAS-SCNC: 31 MMOL/L (ref 22–29)
EGFRCR SERPLBLD CKD-EPI 2021: >90 ML/MIN/1.73M2
EOSINOPHIL # BLD AUTO: 0.2 10E3/UL (ref 0–0.7)
EOSINOPHIL NFR BLD AUTO: 2 %
ERYTHROCYTE [DISTWIDTH] IN BLOOD BY AUTOMATED COUNT: 13.3 % (ref 10–15)
GLUCOSE SERPL-MCNC: 147 MG/DL (ref 70–99)
GLUCOSE UR STRIP-MCNC: 500 MG/DL
HCT VFR BLD AUTO: 37.9 % (ref 35–47)
HGB BLD-MCNC: 12.5 G/DL (ref 11.7–15.7)
HGB UR QL STRIP: NEGATIVE
IMM GRANULOCYTES # BLD: 0 10E3/UL
IMM GRANULOCYTES NFR BLD: 0 %
KETONES UR STRIP-MCNC: NEGATIVE MG/DL
LEUKOCYTE ESTERASE UR QL STRIP: NEGATIVE
LYMPHOCYTES # BLD AUTO: 2.9 10E3/UL (ref 0.8–5.3)
LYMPHOCYTES NFR BLD AUTO: 33 %
MCH RBC QN AUTO: 29 PG (ref 26.5–33)
MCHC RBC AUTO-ENTMCNC: 33 G/DL (ref 31.5–36.5)
MCV RBC AUTO: 88 FL (ref 78–100)
MONOCYTES # BLD AUTO: 0.5 10E3/UL (ref 0–1.3)
MONOCYTES NFR BLD AUTO: 6 %
MUCOUS THREADS #/AREA URNS LPF: PRESENT /LPF
NEUTROPHILS # BLD AUTO: 5.1 10E3/UL (ref 1.6–8.3)
NEUTROPHILS NFR BLD AUTO: 58 %
NITRATE UR QL: NEGATIVE
NRBC # BLD AUTO: 0 10E3/UL
NRBC BLD AUTO-RTO: 0 /100
PH UR STRIP: 6 [PH] (ref 5–9)
PLATELET # BLD AUTO: 265 10E3/UL (ref 150–450)
POTASSIUM SERPL-SCNC: 3.7 MMOL/L (ref 3.4–5.3)
PROT SERPL-MCNC: 7.2 G/DL (ref 6.4–8.3)
RBC # BLD AUTO: 4.31 10E6/UL (ref 3.8–5.2)
RBC URINE: <1 /HPF
SODIUM SERPL-SCNC: 144 MMOL/L (ref 135–145)
SP GR UR STRIP: 1.01 (ref 1–1.03)
SQUAMOUS EPITHELIAL: 1 /HPF
UROBILINOGEN UR STRIP-MCNC: NORMAL MG/DL
WBC # BLD AUTO: 8.8 10E3/UL (ref 4–11)
WBC URINE: 3 /HPF

## 2024-02-16 PROCEDURE — 85025 COMPLETE CBC W/AUTO DIFF WBC: CPT | Mod: ZL | Performed by: INTERNAL MEDICINE

## 2024-02-16 PROCEDURE — 81001 URINALYSIS AUTO W/SCOPE: CPT | Mod: ZL | Performed by: INTERNAL MEDICINE

## 2024-02-16 PROCEDURE — 80053 COMPREHEN METABOLIC PANEL: CPT | Mod: ZL | Performed by: INTERNAL MEDICINE

## 2024-02-16 PROCEDURE — 36415 COLL VENOUS BLD VENIPUNCTURE: CPT | Mod: ZL | Performed by: INTERNAL MEDICINE

## 2024-02-16 PROCEDURE — 74176 CT ABD & PELVIS W/O CONTRAST: CPT

## 2024-02-16 PROCEDURE — 86140 C-REACTIVE PROTEIN: CPT | Mod: ZL | Performed by: INTERNAL MEDICINE

## 2024-02-16 PROCEDURE — 99214 OFFICE O/P EST MOD 30 MIN: CPT | Performed by: INTERNAL MEDICINE

## 2024-02-16 ASSESSMENT — PATIENT HEALTH QUESTIONNAIRE - PHQ9
SUM OF ALL RESPONSES TO PHQ QUESTIONS 1-9: 1
10. IF YOU CHECKED OFF ANY PROBLEMS, HOW DIFFICULT HAVE THESE PROBLEMS MADE IT FOR YOU TO DO YOUR WORK, TAKE CARE OF THINGS AT HOME, OR GET ALONG WITH OTHER PEOPLE: NOT DIFFICULT AT ALL
SUM OF ALL RESPONSES TO PHQ QUESTIONS 1-9: 1

## 2024-02-16 ASSESSMENT — PAIN SCALES - GENERAL: PAINLEVEL: SEVERE PAIN (6)

## 2024-02-16 ASSESSMENT — ENCOUNTER SYMPTOMS
FREQUENCY: 0
CHILLS: 0
ABDOMINAL PAIN: 1
HEMATURIA: 0
SHORTNESS OF BREATH: 0
DIARRHEA: 0
FLANK PAIN: 1
FEVER: 0
VOMITING: 0
ABDOMINAL DISTENTION: 0
DYSURIA: 0
COUGH: 0
NAUSEA: 0

## 2024-02-16 NOTE — PATIENT INSTRUCTIONS
Right flank pain - broad differential....     Labs today.   CT Scan for kidney stones ordered  - they will call with date/time of appointment.      Return as needed for follow-up for new / worsening symptoms.    Clinic : [phone]  Appointment line: [phone]        Results for orders placed or performed in visit on 02/16/24   UA with Microscopic reflex to Culture     Status: Abnormal    Specimen: Urine, Midstream   Result Value Ref Range    Color Urine Yellow Colorless, Straw, Light Yellow, Yellow    Appearance Urine Clear Clear    Glucose Urine 500 (A) Negative mg/dL    Bilirubin Urine Negative Negative    Ketones Urine Negative Negative mg/dL    Specific Gravity Urine 1.015 1.005 - 1.030    Blood Urine Negative Negative    pH Urine 6.0 5.0 - 9.0    Protein Albumin Urine Negative Negative mg/dL    Urobilinogen Urine Normal Normal, 2.0 mg/dL    Nitrite Urine Negative Negative    Leukocyte Esterase Urine Negative Negative    Mucus Urine Present (A) None Seen /LPF    RBC Urine <1 <=2 /HPF    WBC Urine 3 <=5 /HPF    Squamous Epithelials Urine 1 <=1 /HPF    Narrative    Urine Culture not indicated  Urine Culture not indicated  Urine Culture not indicated

## 2024-02-16 NOTE — NURSING NOTE
"Chief Complaint   Patient presents with    Possible UTI       Initial /68 (BP Location: Right arm, Patient Position: Sitting, Cuff Size: Adult Regular)   Pulse 74   Temp 99.4  F (37.4  C) (Temporal)   Resp 20   Ht 1.626 m (5' 4\")   Wt 88 kg (193 lb 14.4 oz)   LMP 07/27/2006   SpO2 97%   Breastfeeding No   BMI 33.28 kg/m   Estimated body mass index is 33.28 kg/m  as calculated from the following:    Height as of this encounter: 1.626 m (5' 4\").    Weight as of this encounter: 88 kg (193 lb 14.4 oz).  Medication Review: complete    The next two questions are to help us understand your food security.  If you are feeling you need any assistance in this area, we have resources available to support you today.          1/12/2024   SDOH- Food Insecurity   Within the past 12 months, did you worry that your food would run out before you got money to buy more? N   Within the past 12 months, did the food you bought just not last and you didn t have money to get more? N         Health Care Directive:  Patient does not have a Health Care Directive or Living Will: Discussed advance care planning with patient; however, patient declined at this time.    Sharif Arzate      "

## 2024-02-16 NOTE — PROGRESS NOTES
Assessment & Plan   Problem List Items Addressed This Visit          Nervous and Auditory    Right flank pain - Primary    Relevant Orders    UA with Microscopic reflex to Culture (Completed)    CBC with Platelets & Differential (Completed)    Comprehensive metabolic panel (Completed)    CRP inflammation (Completed)    CT Abdomen Pelvis w/o Contrast (Completed)       Other    Family history of kidney stones    Relevant Orders    CT Abdomen Pelvis w/o Contrast (Completed)     Other Visit Diagnoses       Class 1 obesity due to excess calories with serious comorbidity and body mass index (BMI) of 33.0 to 33.9 in adult        Mixed hyperlipidemia               Patient presents for evaluation of right flank pain.  She is concerned she possibly had bladder infection.  Symptoms started up the last couple of weeks.  Mostly right lower back overlying the CVA/kidney area.  Reports pain is bit of a dull ache does not spread or radiate anywhere.  Pain does get better and worse and has been quite constant.  She does have a family history kidney stones.    Right flank pain - broad differential....     Labs today.   CT Scan for kidney stones ordered  - they will call with date/time of appointment.      OBESITY - Ongoing.  (See Encounter Diagnosis list above for obesity class / severity).  - Encourage continued maintenance / improvement in diet and exercise.   - Consider Nutrition / Dietician appointment.  - Weight loss would improve Hypertension, Cholesterol and Diabetes.      MIXED HYPERLIPIDEMIA.  Ongoing. LDL is at goal: Yes. Triglycerides are at goal: Yes.    Medication side effects reported: None.   Continue current medications for now. Medication list reviewed/updated. Refills completed as needed.  Recent Labs   Lab Test 07/27/23  0951 02/15/23  0839   CHOL 125 127   HDL 43* 43*   LDL 65 64   TRIG 87 98        Wt Readings from Last 5 Encounters:   02/16/24 88 kg (193 lb 14.4 oz)   01/12/24 89.5 kg (197 lb 6.4 oz)   12/08/23  90.7 kg (200 lb)   07/27/23 94.6 kg (208 lb 9.6 oz)   06/21/23 95.3 kg (210 lb)     Results for orders placed or performed during the hospital encounter of 02/16/24   CT Abdomen Pelvis w/o Contrast     Status: None    Narrative    EXAM: CT ABDOMEN PELVIS W/O CONTRAST 2/16/2024 12:23 PM    HISTORY: Right flank pain; Family history of kidney stones    COMPARISON: CT abdomen pelvis 3/14/2007    TECHNIQUE:   Imaging protocol: Computed tomography of abdomen and pelvis without  contrast.  Acquisition: This CT exam was performed using one or more the  following dose reduction techniques: automated exposure control,  adjustment of the mA and/or kV according to patient size, and/or  iterative reconstruction technique.    FINDINGS:    LOWER CHEST:  Bibasilar atelectasis. No pulmonary mass.    ABDOMEN/PELVIS:  LIVER: Normal contour. No suspicious liver lesions.  GALLBLADDER: Surgically absent.  BILE DUCTS: No biliary tract dilatation.  PANCREAS: Within normal limits.  SPLEEN: Within normal limits.  ADRENALS: 1.4 cm hypodense benign right adrenal adenoma. No left  adrenal nodule.    KIDNEYS/URETERS: No soft tissue mass. No hydronephrosis. No  nephrolithiasis.  URINARY BLADDER: Within normal limits.  REPRODUCTIVE ORGANS: Postsurgical changes of hysterectomy.    BOWEL: No bowel dilatation or wall thickening. Colonic diverticulosis  without diverticulitis. Normal appendix.  PERITONEUM/RETROPERITONEUM: No free air or free fluid.  VESSELS: Within normal limits.  LYMPH NODES: There are no pathologically enlarged lymph nodes.    BONES AND SOFT TISSUES:  No suspicious osseous lesions. Mild subcutaneous edema over the  anteroinferior abdominal wall bilaterally. Degenerative periarticular  changes and spinal spondylosis.      Impression    IMPRESSION:  No acute abnormality in the abdomen or pelvis. No obstructive uropathy  or nephrolithiasis.    KARI LUCERO MD         SYSTEM ID:  [id number]   Results for orders placed or performed in  visit on 02/16/24   UA with Microscopic reflex to Culture     Status: Abnormal    Specimen: Urine, Midstream   Result Value Ref Range    Color Urine Yellow Colorless, Straw, Light Yellow, Yellow    Appearance Urine Clear Clear    Glucose Urine 500 (A) Negative mg/dL    Bilirubin Urine Negative Negative    Ketones Urine Negative Negative mg/dL    Specific Gravity Urine 1.015 1.005 - 1.030    Blood Urine Negative Negative    pH Urine 6.0 5.0 - 9.0    Protein Albumin Urine Negative Negative mg/dL    Urobilinogen Urine Normal Normal, 2.0 mg/dL    Nitrite Urine Negative Negative    Leukocyte Esterase Urine Negative Negative    Mucus Urine Present (A) None Seen /LPF    RBC Urine <1 <=2 /HPF    WBC Urine 3 <=5 /HPF    Squamous Epithelials Urine 1 <=1 /HPF    Narrative    Urine Culture not indicated  Urine Culture not indicated  Urine Culture not indicated   Comprehensive metabolic panel     Status: Abnormal   Result Value Ref Range    Sodium 144 135 - 145 mmol/L    Potassium 3.7 3.4 - 5.3 mmol/L    Carbon Dioxide (CO2) 31 (H) 22 - 29 mmol/L    Anion Gap 7 7 - 15 mmol/L    Urea Nitrogen 12.4 6.0 - 20.0 mg/dL    Creatinine 0.76 0.51 - 0.95 mg/dL    GFR Estimate >90 >60 mL/min/1.73m2    Calcium 9.6 8.6 - 10.0 mg/dL    Chloride 106 98 - 107 mmol/L    Glucose 147 (H) 70 - 99 mg/dL    Alkaline Phosphatase 79 40 - 150 U/L    AST 23 0 - 45 U/L    ALT 41 0 - 50 U/L    Protein Total 7.2 6.4 - 8.3 g/dL    Albumin 4.6 3.5 - 5.2 g/dL    Bilirubin Total 0.5 <=1.2 mg/dL   CRP inflammation     Status: Normal   Result Value Ref Range    CRP Inflammation <3.00 <5.00 mg/L   CBC with platelets and differential     Status: None   Result Value Ref Range    WBC Count 8.8 4.0 - 11.0 10e3/uL    RBC Count 4.31 3.80 - 5.20 10e6/uL    Hemoglobin 12.5 11.7 - 15.7 g/dL    Hematocrit 37.9 35.0 - 47.0 %    MCV 88 78 - 100 fL    MCH 29.0 26.5 - 33.0 pg    MCHC 33.0 31.5 - 36.5 g/dL    RDW 13.3 10.0 - 15.0 %    Platelet Count 265 150 - 450 10e3/uL    %  "Neutrophils 58 %    % Lymphocytes 33 %    % Monocytes 6 %    % Eosinophils 2 %    % Basophils 1 %    % Immature Granulocytes 0 %    NRBCs per 100 WBC 0 <1 /100    Absolute Neutrophils 5.1 1.6 - 8.3 10e3/uL    Absolute Lymphocytes 2.9 0.8 - 5.3 10e3/uL    Absolute Monocytes 0.5 0.0 - 1.3 10e3/uL    Absolute Eosinophils 0.2 0.0 - 0.7 10e3/uL    Absolute Basophils 0.1 0.0 - 0.2 10e3/uL    Absolute Immature Granulocytes 0.0 <=0.4 10e3/uL    Absolute NRBCs 0.0 10e3/uL   CBC with Platelets & Differential     Status: None    Narrative    The following orders were created for panel order CBC with Platelets & Differential.  Procedure                               Abnormality         Status                     ---------                               -----------         ------                     CBC with platelets and d...[[id number]]                      Final result                 Please view results for these tests on the individual orders.      CBC is normal.  CRP normal.  Random glucose elevated at 147.  Metabolic panel otherwise normal.  Liver enzymes normal urinalysis shows elevated glucose, otherwise negative.  No indication for urine culture.  CT scan shows no acute abnormality within the abdomen or pelvis.  No obstructive uropathy or nephrolithiasis.           BMI  Estimated body mass index is 33.28 kg/m  as calculated from the following:    Height as of this encounter: 1.626 m (5' 4\").    Weight as of this encounter: 88 kg (193 lb 14.4 oz).         Return for follow-up as needed for new or worsening symptoms, Appointments: [phone].      Stoney Cornejo MD  [facility]    Review of Systems   Constitutional:  Negative for chills and fever.   Respiratory:  Negative for cough and shortness of breath.    Cardiovascular:  Negative for chest pain.   Gastrointestinal:  Positive for abdominal pain (right lateral side). Negative for abdominal distention, diarrhea, nausea and vomiting. "   Genitourinary:  Positive for flank pain. Negative for dysuria, frequency, hematuria and urgency.   Skin:  Negative for rash.   Allergic/Immunologic: Negative for immunocompromised state.        Brandie Nur is a 58 year old, presenting for the following health issues:  Possible UTI        2/16/2024    10:17 AM   Additional Questions   Roomed by Sharif Rodriguez LPN   Accompanied by n/a     History of Present Illness       Back Pain:  She presents for follow up of back pain. Patient's back pain is a new problem.    Original cause of back pain: not sure  First noticed back pain: 1-4 weeks ago  Patient feels back pain: dailyLocation of back pain:  Right lower back  Description of back pain: dull ache  Back pain spreads: nowhere    Since patient first noticed back pain, pain is: always present, but gets better and worse  Does back pain interfere with her job:  No  On a scale of 1-10 (10 being the worst), patient describes pain as:  6  What makes back pain worse: nothing and other   Acupuncture: not tried  Acetaminophen: not tried  Activity or exercise: not tried  Chiropractor:  Helpful  Heat: not tried  Massage: not tried  Muscle relaxants: not tried  NSAIDS: not tried  Physical Therapy: not tried  Rest: not tried  Steroid Injection: not tried  Stretching: not tried  Surgery: not tried  TENS unit: not tried  Topical pain relievers: not tried  Other healthcare providers patient is seeing for back pain: Chiropractor    She eats 0-1 servings of fruits and vegetables daily.She consumes 0 sweetened beverage(s) daily.She exercises with enough effort to increase her heart rate 9 or less minutes per day.  She exercises with enough effort to increase her heart rate 3 or less days per week.   She is taking medications regularly.         Genitourinary - Female  Onset/Duration: 2 weeks  Description:   Painful urination (Dysuria): No           Frequency: YES  Blood in urine (Hematuria): No  Delay in urine (Hesitency):  "YES  Intensity: moderate  Progression of Symptoms:  worsening  Accompanying Signs & Symptoms:  Fever/chills: No  Flank pain: YES  Nausea and vomiting: No  Vaginal symptoms: none  Abdominal/Pelvic Pain: No  History:   History of frequent UTI s: No  History of kidney stones: No  Sexually Active: YES  Possibility of pregnancy: No  Precipitating or alleviating factors: None  Therapies tried and outcome:             Objective    /68 (BP Location: Right arm, Patient Position: Sitting, Cuff Size: Adult Regular)   Pulse 74   Temp 99.4  F (37.4  C) (Temporal)   Resp 20   Ht 1.626 m (5' 4\")   Wt 88 kg (193 lb 14.4 oz)   LMP 07/27/2006   SpO2 97%   Breastfeeding No   BMI 33.28 kg/m    Body mass index is 33.28 kg/m .  Physical Exam  Constitutional:       Appearance: She is obese.   Eyes:      General: No scleral icterus.     Conjunctiva/sclera: Conjunctivae normal.   Cardiovascular:      Rate and Rhythm: Normal rate and regular rhythm.   Pulmonary:      Effort: Pulmonary effort is normal.   Abdominal:      General: Bowel sounds are normal. There is no distension.      Palpations: Abdomen is soft.      Tenderness: There is no abdominal tenderness. There is right CVA tenderness (Mild). There is no left CVA tenderness.   Skin:     Findings: No rash.   Neurological:      Mental Status: She is alert. Mental status is at baseline.   Psychiatric:         Mood and Affect: Mood normal.         Behavior: Behavior normal.                    Signed Electronically by: Stoney Cornejo MD    "

## 2024-02-19 ENCOUNTER — TRANSFERRED RECORDS (OUTPATIENT)
Dept: HEALTH INFORMATION MANAGEMENT | Facility: OTHER | Age: 59
End: 2024-02-19
Payer: COMMERCIAL

## 2024-02-19 LAB — RETINOPATHY: NEGATIVE

## 2024-02-27 ENCOUNTER — PATIENT OUTREACH (OUTPATIENT)
Dept: GASTROENTEROLOGY | Facility: CLINIC | Age: 59
End: 2024-02-27
Payer: COMMERCIAL

## 2024-05-06 DIAGNOSIS — E11.9 TYPE 2 DIABETES MELLITUS WITHOUT COMPLICATION, WITHOUT LONG-TERM CURRENT USE OF INSULIN (H): Primary | ICD-10-CM

## 2024-05-08 ENCOUNTER — LAB (OUTPATIENT)
Dept: LAB | Facility: OTHER | Age: 59
End: 2024-05-08
Payer: COMMERCIAL

## 2024-05-08 DIAGNOSIS — E11.9 TYPE 2 DIABETES MELLITUS WITHOUT COMPLICATION, WITHOUT LONG-TERM CURRENT USE OF INSULIN (H): ICD-10-CM

## 2024-05-08 LAB
ANION GAP SERPL CALCULATED.3IONS-SCNC: 8 MMOL/L (ref 7–15)
BASOPHILS # BLD AUTO: 0.1 10E3/UL (ref 0–0.2)
BASOPHILS NFR BLD AUTO: 1 %
BUN SERPL-MCNC: 13.6 MG/DL (ref 6–20)
CALCIUM SERPL-MCNC: 9.3 MG/DL (ref 8.6–10)
CHLORIDE SERPL-SCNC: 105 MMOL/L (ref 98–107)
CHOLEST SERPL-MCNC: 116 MG/DL
CREAT SERPL-MCNC: 0.8 MG/DL (ref 0.51–0.95)
DEPRECATED HCO3 PLAS-SCNC: 28 MMOL/L (ref 22–29)
EGFRCR SERPLBLD CKD-EPI 2021: 85 ML/MIN/1.73M2
EOSINOPHIL # BLD AUTO: 0.2 10E3/UL (ref 0–0.7)
EOSINOPHIL NFR BLD AUTO: 2 %
ERYTHROCYTE [DISTWIDTH] IN BLOOD BY AUTOMATED COUNT: 12.4 % (ref 10–15)
FASTING STATUS PATIENT QL REPORTED: NO
FASTING STATUS PATIENT QL REPORTED: NO
GLUCOSE SERPL-MCNC: 154 MG/DL (ref 70–99)
HBA1C MFR BLD: 7.2 % (ref 4–6.2)
HCT VFR BLD AUTO: 40 % (ref 35–47)
HDLC SERPL-MCNC: 41 MG/DL
HGB BLD-MCNC: 13.4 G/DL (ref 11.7–15.7)
IMM GRANULOCYTES # BLD: 0 10E3/UL
IMM GRANULOCYTES NFR BLD: 0 %
LDLC SERPL CALC-MCNC: 48 MG/DL
LYMPHOCYTES # BLD AUTO: 3.2 10E3/UL (ref 0.8–5.3)
LYMPHOCYTES NFR BLD AUTO: 35 %
MCH RBC QN AUTO: 30.3 PG (ref 26.5–33)
MCHC RBC AUTO-ENTMCNC: 33.5 G/DL (ref 31.5–36.5)
MCV RBC AUTO: 91 FL (ref 78–100)
MONOCYTES # BLD AUTO: 0.6 10E3/UL (ref 0–1.3)
MONOCYTES NFR BLD AUTO: 7 %
NEUTROPHILS # BLD AUTO: 5 10E3/UL (ref 1.6–8.3)
NEUTROPHILS NFR BLD AUTO: 55 %
NONHDLC SERPL-MCNC: 75 MG/DL
NRBC # BLD AUTO: 0 10E3/UL
NRBC BLD AUTO-RTO: 0 /100
PLATELET # BLD AUTO: 269 10E3/UL (ref 150–450)
POTASSIUM SERPL-SCNC: 4.1 MMOL/L (ref 3.4–5.3)
RBC # BLD AUTO: 4.42 10E6/UL (ref 3.8–5.2)
SODIUM SERPL-SCNC: 141 MMOL/L (ref 135–145)
TRIGL SERPL-MCNC: 134 MG/DL
WBC # BLD AUTO: 9.2 10E3/UL (ref 4–11)

## 2024-05-08 PROCEDURE — 80061 LIPID PANEL: CPT | Mod: ZL

## 2024-05-08 PROCEDURE — 80048 BASIC METABOLIC PNL TOTAL CA: CPT | Mod: ZL

## 2024-05-08 PROCEDURE — 85004 AUTOMATED DIFF WBC COUNT: CPT | Mod: ZL

## 2024-05-08 PROCEDURE — 83036 HEMOGLOBIN GLYCOSYLATED A1C: CPT | Mod: ZL

## 2024-05-08 PROCEDURE — 36415 COLL VENOUS BLD VENIPUNCTURE: CPT | Mod: ZL

## 2024-05-09 RX ORDER — ACYCLOVIR 400 MG/1
TABLET ORAL
Qty: 3 EACH | Refills: 5 | Status: SHIPPED | OUTPATIENT
Start: 2024-05-09

## 2024-05-09 NOTE — TELEPHONE ENCOUNTER
Continuous Glucose Sensor (DEXCOM G7 SENSOR) MISC         Last Written Prescription Date:  7/27/23  Last Fill Quantity: 3,   # refills: 5  Last Office Visit: 2/16/24  Future Office visit:       Routing refill request to provider for review/approval because:  Drug not on the [facility] or [facility] refill protocol or controlled substance. Unable to complete prescription refill per RNMedication Refill Policy.................... Angela Willard RN ....................  5/9/2024   4:50 PM

## 2024-05-10 ENCOUNTER — MYC MEDICAL ADVICE (OUTPATIENT)
Dept: INTERNAL MEDICINE | Facility: OTHER | Age: 59
End: 2024-05-10
Payer: COMMERCIAL

## 2024-05-10 DIAGNOSIS — E11.9 TYPE 2 DIABETES MELLITUS WITHOUT COMPLICATION, WITHOUT LONG-TERM CURRENT USE OF INSULIN (H): Primary | ICD-10-CM

## 2024-05-13 NOTE — TELEPHONE ENCOUNTER
Should have had Trulicity 4.5 mg shot 5/12 but did not take.     Osman'd up starting dose of Ozempic 2 mg since she has been on the Trulicity 4.5 mg.    Routing to provider to review and respond.  Anabelle Lua RN on 5/13/2024 at 12:21 PM

## 2024-05-13 NOTE — TELEPHONE ENCOUNTER
I recommend she start at least as low as 1 mg to prevent side effects.  1 mg weekly x 1 month then may increase to 2 mg weekly ongoing.  This is the max dose.    I also recommend she follow back up in clinic for diabetes follow-up.    Ronal Vieira MD on 5/13/2024 at 12:33 PM

## 2024-05-24 ENCOUNTER — HOSPITAL ENCOUNTER (OUTPATIENT)
Dept: MAMMOGRAPHY | Facility: OTHER | Age: 59
Discharge: HOME | End: 2024-05-24
Attending: STUDENT IN AN ORGANIZED HEALTH CARE EDUCATION/TRAINING PROGRAM | Admitting: STUDENT IN AN ORGANIZED HEALTH CARE EDUCATION/TRAINING PROGRAM
Payer: COMMERCIAL

## 2024-05-24 DIAGNOSIS — Z12.31 VISIT FOR SCREENING MAMMOGRAM: ICD-10-CM

## 2024-05-24 PROCEDURE — 77063 BREAST TOMOSYNTHESIS BI: CPT

## 2024-05-27 ENCOUNTER — MYC MEDICAL ADVICE (OUTPATIENT)
Dept: INTERNAL MEDICINE | Facility: OTHER | Age: 59
End: 2024-05-27
Payer: COMMERCIAL

## 2024-06-04 ENCOUNTER — MYC MEDICAL ADVICE (OUTPATIENT)
Dept: INTERNAL MEDICINE | Facility: OTHER | Age: 59
End: 2024-06-04
Payer: COMMERCIAL

## 2024-06-12 ENCOUNTER — MYC MEDICAL ADVICE (OUTPATIENT)
Dept: INTERNAL MEDICINE | Facility: OTHER | Age: 59
End: 2024-06-12
Payer: COMMERCIAL

## 2024-06-13 NOTE — TELEPHONE ENCOUNTER
Printed insurance cards and brought to check in desk to have them scanned in as patient's new insurance.     Patient update on MyChart.    Simi Pederson RN on 6/13/2024 at 10:49 AM      New insurance cards are now scanned in to chart.      Reached out to [facility] to inquire [facility]  Continued dosing with insurance change.      Patient update on MyChart.    Simi Pederson RN on 6/13/2024 at 1:32 PM

## 2024-06-14 NOTE — TELEPHONE ENCOUNTER
Update from [employer]:  Ozempic Approved through new insurance.     Patient update on MyChart.    Simi Pederson RN on 6/14/2024 at 8:03 AM

## 2024-06-22 DIAGNOSIS — E11.9 TYPE 2 DIABETES MELLITUS WITHOUT COMPLICATION (H): ICD-10-CM

## 2024-06-26 NOTE — TELEPHONE ENCOUNTER
[facility] sent Rx request for the following:      Requested Prescriptions   Pending Prescriptions Disp Refills    blood glucose (CONTOUR NEXT TEST) test strip [Pharmacy Med Name: Contour Next Test In Vitro Strip]  0     Sig: USE TO CHECK GLUCOSE TWICE DAILY       Diabetic Supplies Protocol Passed - 6/26/2024 12:59 PM   Last Prescription Date:   12/13/23  Last Fill Qty/Refills:         200, R-0    Last Office Visit:              2/16/24   Future Office visit:            none     Perla Celis RN on 6/26/2024 at 1:00 PM

## 2024-07-31 ENCOUNTER — OFFICE VISIT (OUTPATIENT)
Dept: FAMILY MEDICINE | Facility: OTHER | Age: 59
End: 2024-07-31
Attending: STUDENT IN AN ORGANIZED HEALTH CARE EDUCATION/TRAINING PROGRAM
Payer: COMMERCIAL

## 2024-07-31 VITALS
SYSTOLIC BLOOD PRESSURE: 124 MMHG | RESPIRATION RATE: 18 BRPM | TEMPERATURE: 97.6 F | HEIGHT: 64 IN | HEART RATE: 70 BPM | OXYGEN SATURATION: 97 % | BODY MASS INDEX: 32.37 KG/M2 | DIASTOLIC BLOOD PRESSURE: 79 MMHG | WEIGHT: 189.6 LBS

## 2024-07-31 DIAGNOSIS — H61.22 IMPACTED CERUMEN OF LEFT EAR: Primary | ICD-10-CM

## 2024-07-31 DIAGNOSIS — H65.02 NON-RECURRENT ACUTE SEROUS OTITIS MEDIA OF LEFT EAR: ICD-10-CM

## 2024-07-31 PROCEDURE — 99213 OFFICE O/P EST LOW 20 MIN: CPT | Performed by: STUDENT IN AN ORGANIZED HEALTH CARE EDUCATION/TRAINING PROGRAM

## 2024-07-31 PROCEDURE — 69209 REMOVE IMPACTED EAR WAX UNI: CPT | Performed by: STUDENT IN AN ORGANIZED HEALTH CARE EDUCATION/TRAINING PROGRAM

## 2024-07-31 RX ORDER — ACYCLOVIR 400 MG/1
TABLET ORAL
COMMUNITY
Start: 2023-08-07

## 2024-07-31 RX ORDER — AMOXICILLIN 875 MG
875 TABLET ORAL 2 TIMES DAILY
Qty: 14 TABLET | Refills: 0 | Status: SHIPPED | OUTPATIENT
Start: 2024-07-31 | End: 2024-08-07

## 2024-07-31 ASSESSMENT — PATIENT HEALTH QUESTIONNAIRE - PHQ9
10. IF YOU CHECKED OFF ANY PROBLEMS, HOW DIFFICULT HAVE THESE PROBLEMS MADE IT FOR YOU TO DO YOUR WORK, TAKE CARE OF THINGS AT HOME, OR GET ALONG WITH OTHER PEOPLE: NOT DIFFICULT AT ALL
SUM OF ALL RESPONSES TO PHQ QUESTIONS 1-9: 0
SUM OF ALL RESPONSES TO PHQ QUESTIONS 1-9: 0

## 2024-07-31 ASSESSMENT — PAIN SCALES - GENERAL: PAINLEVEL: MODERATE PAIN (5)

## 2024-07-31 NOTE — NURSING NOTE
The Left ear canal was irrigated with body-temperature tap water.  The ear canal was then re-examined and cleared of the impaction.  The patient tolerated the procedure well.      Norma J. Gosselin, LPN

## 2024-07-31 NOTE — NURSING NOTE
"Chief Complaint   Patient presents with    Pharyngitis    Generalized Body Aches    Ear Problem     Left ear and side of neck pain    Fatigue   Patient presents today with ongoing body aches, sore throat, fatigue and pain in ear and on neck only left side. She has taken Alkaseltzer plus at home.         Initial /79 (BP Location: Right arm, Patient Position: Sitting, Cuff Size: Adult Regular)   Pulse 70   Temp 97.6  F (36.4  C) (Temporal)   Resp 18   Ht 1.626 m (5' 4\")   Wt 86 kg (189 lb 9.6 oz)   LMP 07/27/2006   SpO2 97%   BMI 32.54 kg/m   Estimated body mass index is 32.54 kg/m  as calculated from the following:    Height as of this encounter: 1.626 m (5' 4\").    Weight as of this encounter: 86 kg (189 lb 9.6 oz).     FOOD SECURITY SCREENING QUESTIONS:    The next two questions are to help us understand your food security.  If you are feeling you need any assistance in this area, we have resources available to support you today.    Hunger Vital Signs:  Within the past 12 months we worried whether our food would run out before we got money to buy more. Never  Within the past 12 months the food we bought just didn't last and we didn't have money to get more. Never      Deandra Shankar    "

## 2024-08-01 NOTE — PROGRESS NOTES
"  Assessment & Plan     (H61.22) Impacted cerumen of left ear  (primary encounter diagnosis)    Comment: Cerumen removal of the left ear.  Evidence of mild otitis media of the left ear after cerumen removal.  As far as skin sensitivity, consider shingles though there is no obvious rash.  Recommended continuing to monitor for rash to develop.  Other symptoms most likely caused by viral illness.    Plan: HC REMOVAL IMPACTED CERUMEN IRRIGATION/LVG         UNILAT, amoxicillin (AMOXIL) 875 MG tablet            (H65.02) Non-recurrent acute serous otitis media of left ear  Comment: Otitis media of the left ear.  Plan: amoxicillin (AMOXIL) 875 MG tablet          Plan to treat with amoxicillin twice a day for 1 week.  Continue over-the-counter management.  Follow-up with PCP for any persisting symptoms.  Return to rapid clinic or ER for worsening or changing symptoms.  She is comfortable and agreeable with this plan.          Brandie Nur is a 58 year old, presenting for the following health issues:  Pharyngitis, Generalized Body Aches, Ear Problem (Left ear and side of neck pain), and Fatigue    HPI     Patient presents today with a few day history of bodyaches, ear pain as well as fatigue.  She has also developed a mild sore throat.  She said the earache is the most bothersome, it is her left ear.  She also notes some high sensitivity on her left cheek, the skin on her cheek.  She has developed today some sensitivity on her right mid back.  She has not noticed any rashes on her skin.  She has taken Tylenol for symptoms.      Review of Systems  Constitutional, HEENT, cardiovascular, pulmonary, gi and gu systems are negative, except as otherwise noted.          Objective    /79 (BP Location: Right arm, Patient Position: Sitting, Cuff Size: Adult Regular)   Pulse 70   Temp 97.6  F (36.4  C) (Temporal)   Resp 18   Ht 1.626 m (5' 4\")   Wt 86 kg (189 lb 9.6 oz)   LMP 07/27/2006   SpO2 97%   BMI 32.54 kg/m  "   Body mass index is 32.54 kg/m .      Physical Exam   GENERAL: alert and no distress  EYES: Eyes grossly normal to inspection, PERRL and conjunctivae and sclerae normal  HENT: Left ear canal with cerumen impaction, right TM erythematous, slightly bulging, right TM as well as canal normal, nose and mouth without ulcers or lesions  NECK: no adenopathy, no asymmetry, masses, or scars  RESP: lungs clear to auscultation - no rales, rhonchi or wheezes  CV: regular rate and rhythm, normal S1 S2  ABDOMEN: soft, nontender, no hepatosplenomegaly, no masses and bowel sounds normal  MS: no gross musculoskeletal defects noted, no edema  INTEG: Skin without any notable rashes or lesions      Signed Electronically by: Sammi Wise PA-C

## 2024-08-01 NOTE — PATIENT INSTRUCTIONS
Ear Infection    Amoxicillin twice a day for one week, probiotics such as yogurt.    Ibuprofen/tylenol.    Heat packs.    Follow up as needed.    Return to rapid clinic or ER if symptoms worsen or change.

## 2024-08-07 ENCOUNTER — MYC MEDICAL ADVICE (OUTPATIENT)
Dept: INTERNAL MEDICINE | Facility: OTHER | Age: 59
End: 2024-08-07
Payer: COMMERCIAL

## 2024-08-26 DIAGNOSIS — E11.9 TYPE 2 DIABETES MELLITUS WITHOUT COMPLICATION, WITHOUT LONG-TERM CURRENT USE OF INSULIN (H): ICD-10-CM

## 2024-08-28 RX ORDER — EMPAGLIFLOZIN 25 MG/1
25 TABLET, FILM COATED ORAL DAILY
Qty: 90 TABLET | Refills: 2 | Status: SHIPPED | OUTPATIENT
Start: 2024-08-28

## 2024-08-28 NOTE — TELEPHONE ENCOUNTER
[facility] sent Rx request for the following:      Requested Prescriptions   Pending Prescriptions Disp Refills    JARDIANCE 25 MG TABS tablet [Pharmacy Med Name: Jardiance 25 MG Oral Tablet] 90 tablet 0     Sig: Take 1 tablet by mouth once daily       Sodium Glucose Co-Transport Inhibitor Agents Failed - 8/28/2024 11:14 AM     Last Prescription Date:   7/27/23  Last Fill Qty/Refills:         90, R-4    Last Office Visit:              1/12/24   Future Office visit:                Perla Celis RN on 8/28/2024 at 11:15 AM

## 2024-08-29 ENCOUNTER — MYC MEDICAL ADVICE (OUTPATIENT)
Dept: INTERNAL MEDICINE | Facility: OTHER | Age: 59
End: 2024-08-29
Payer: COMMERCIAL

## 2024-09-07 DIAGNOSIS — E78.5 HYPERLIPIDEMIA LDL GOAL <100: ICD-10-CM

## 2024-09-07 DIAGNOSIS — I10 ESSENTIAL HYPERTENSION: ICD-10-CM

## 2024-09-07 DIAGNOSIS — F34.1 DYSTHYMIC DISORDER: ICD-10-CM

## 2024-09-11 RX ORDER — LISINOPRIL 20 MG/1
20 TABLET ORAL DAILY
Qty: 90 TABLET | Refills: 0 | Status: SHIPPED | OUTPATIENT
Start: 2024-09-11

## 2024-09-11 RX ORDER — CITALOPRAM HYDROBROMIDE 20 MG/1
20 TABLET ORAL DAILY
Qty: 90 TABLET | Refills: 0 | Status: SHIPPED | OUTPATIENT
Start: 2024-09-11

## 2024-09-11 RX ORDER — ATORVASTATIN CALCIUM 20 MG/1
20 TABLET, FILM COATED ORAL DAILY
Qty: 90 TABLET | Refills: 0 | Status: SHIPPED | OUTPATIENT
Start: 2024-09-11

## 2024-09-11 NOTE — TELEPHONE ENCOUNTER
Spoke to patient and scheduled an annual exam for BAS in November.    Ashia Wiseman on 9/11/2024 at 11:56 AM

## 2024-09-11 NOTE — TELEPHONE ENCOUNTER
[id number] sent Rx request for the following:      Requested Prescriptions   Pending Prescriptions Disp Refills    lisinopril (ZESTRIL) 20 MG tablet [Pharmacy Med Name: Lisinopril 20 MG Oral Tablet] 90 tablet 0     Sig: Take 1 tablet by mouth once daily   Last Prescription Date:   7/27/23  Last Fill Qty/Refills:         90, R-4        citalopram (CELEXA) 20 MG tablet [Pharmacy Med Name: Citalopram Hydrobromide 20 MG Oral Tablet] 90 tablet 0     Sig: Take 1 tablet by mouth once daily   Last Prescription Date:   7/27/23  Last Fill Qty/Refills:         90, R-4        atorvastatin (LIPITOR) 20 MG tablet [Pharmacy Med Name: Atorvastatin Calcium 20 MG Oral Tablet] 90 tablet 0     Sig: Take 1 tablet by mouth once daily   Last Prescription Date:   7/27/23  Last Fill Qty/Refills:         90, R-4      Last Office Visit:                2/16/24 (hyperlipidemia discussed)  1/12/24 (HTN discussed)  7/27/23 (physical)    Future Office visit:           None    Pt due for annual exam. Routing to provider for refill consideration. Routing to Unit scheduling pool, to assist Pt in scheduling appointment. Unable to complete prescription refill per RN Medication Refill Policy. Callie Blanchard RN .............. 9/11/2024  11:46 AM

## 2024-09-16 DIAGNOSIS — E11.9 TYPE 2 DIABETES MELLITUS WITHOUT COMPLICATION (H): ICD-10-CM

## 2024-09-17 NOTE — TELEPHONE ENCOUNTER
[id number] sent Rx request for the following:      Requested Prescriptions   Pending Prescriptions Disp Refills    blood glucose (CONTOUR NEXT TEST) test strip 200 strip 0     Sig: USE TO CHECK GLUCOSE TWICE DAILY   Last Prescription Date:   6/26/24  Last Fill Qty/Refills:         200, R-0    Last Office Visit:              1/12/24 (DM discussed)   Future Office visit:           11/22/24    Per LOV note:  Follow-up with me in 3 to 6 months pending A1c result.     Prescription refilled per RN Medication Refill Policy.................... Callie Blanchard RN ....................  9/17/2024   2:46 PM

## 2024-09-21 ENCOUNTER — HEALTH MAINTENANCE LETTER (OUTPATIENT)
Age: 59
End: 2024-09-21

## 2024-11-27 ENCOUNTER — TELEPHONE (OUTPATIENT)
Dept: INTERNAL MEDICINE | Facility: OTHER | Age: 59
End: 2024-11-27
Payer: COMMERCIAL

## 2024-11-27 DIAGNOSIS — R21 RASH AND NONSPECIFIC SKIN ERUPTION: ICD-10-CM

## 2024-11-27 RX ORDER — KETOCONAZOLE 20 MG/G
CREAM TOPICAL
Qty: 30 G | Refills: 3 | Status: SHIPPED | OUTPATIENT
Start: 2024-11-27

## 2024-11-27 NOTE — TELEPHONE ENCOUNTER
"Received a fax from [facility] pharmacy in regards to Ketoconazole 2% external cream apply a thin film of cream topically twice a day.    Note from pharmacy:  \"patients insurance requires specific grams per application, please resubmit\"    Ruchi Clay RN on 11/27/2024 at 10:49 AM    "

## 2024-12-17 DIAGNOSIS — E11.9 TYPE 2 DIABETES MELLITUS WITHOUT COMPLICATION, WITHOUT LONG-TERM CURRENT USE OF INSULIN (H): Primary | ICD-10-CM

## 2024-12-18 NOTE — TELEPHONE ENCOUNTER
[id number] sent Rx request for the following:      Requested Prescriptions   Pending Prescriptions Disp Refills    blood glucose (NO BRAND SPECIFIED) lancets standard 100 each 1     Sig: Use to test blood sugar 1 times daily       There is no refill protocol information for this order  Not on current medication list        Last Office Visit:              11/22/24 (Px)   Future Office visit:           None    Unable to complete prescription refill per RN Medication Refill Policy. Callie Blanchard RN .............. 12/18/2024  9:41 AM

## 2025-03-07 ENCOUNTER — TRANSFERRED RECORDS (OUTPATIENT)
Dept: HEALTH INFORMATION MANAGEMENT | Facility: OTHER | Age: 60
End: 2025-03-07
Payer: COMMERCIAL

## 2025-03-07 LAB — RETINOPATHY: NEGATIVE

## 2025-03-19 PROBLEM — F33.42 RECURRENT MAJOR DEPRESSION IN COMPLETE REMISSION: Status: ACTIVE | Noted: 2018-01-17

## 2025-03-19 PROBLEM — E78.2 MIXED HYPERLIPIDEMIA: Status: ACTIVE | Noted: 2018-10-02

## 2025-04-07 DIAGNOSIS — E78.2 MIXED HYPERLIPIDEMIA: Primary | ICD-10-CM

## 2025-04-07 DIAGNOSIS — N18.2 TYPE 2 DIABETES MELLITUS WITH STAGE 2 CHRONIC KIDNEY DISEASE, WITHOUT LONG-TERM CURRENT USE OF INSULIN (H): ICD-10-CM

## 2025-04-07 DIAGNOSIS — E11.22 TYPE 2 DIABETES MELLITUS WITH STAGE 2 CHRONIC KIDNEY DISEASE, WITHOUT LONG-TERM CURRENT USE OF INSULIN (H): ICD-10-CM

## 2025-04-11 ENCOUNTER — OFFICE VISIT (OUTPATIENT)
Dept: INTERNAL MEDICINE | Facility: OTHER | Age: 60
End: 2025-04-11
Attending: INTERNAL MEDICINE
Payer: COMMERCIAL

## 2025-04-11 VITALS
DIASTOLIC BLOOD PRESSURE: 66 MMHG | RESPIRATION RATE: 14 BRPM | HEART RATE: 60 BPM | WEIGHT: 177.6 LBS | TEMPERATURE: 97.6 F | OXYGEN SATURATION: 98 % | SYSTOLIC BLOOD PRESSURE: 108 MMHG | BODY MASS INDEX: 30.48 KG/M2

## 2025-04-11 DIAGNOSIS — I10 BENIGN ESSENTIAL HYPERTENSION: ICD-10-CM

## 2025-04-11 DIAGNOSIS — N18.2 TYPE 2 DIABETES MELLITUS WITH STAGE 2 CHRONIC KIDNEY DISEASE, WITHOUT LONG-TERM CURRENT USE OF INSULIN (H): Primary | ICD-10-CM

## 2025-04-11 DIAGNOSIS — E78.2 MIXED HYPERLIPIDEMIA: ICD-10-CM

## 2025-04-11 DIAGNOSIS — E11.22 TYPE 2 DIABETES MELLITUS WITH STAGE 2 CHRONIC KIDNEY DISEASE, WITHOUT LONG-TERM CURRENT USE OF INSULIN (H): Primary | ICD-10-CM

## 2025-04-11 DIAGNOSIS — E66.811 CLASS 1 OBESITY DUE TO EXCESS CALORIES WITH SERIOUS COMORBIDITY AND BODY MASS INDEX (BMI) OF 30.0 TO 30.9 IN ADULT: ICD-10-CM

## 2025-04-11 DIAGNOSIS — E66.09 CLASS 1 OBESITY DUE TO EXCESS CALORIES WITH SERIOUS COMORBIDITY AND BODY MASS INDEX (BMI) OF 30.0 TO 30.9 IN ADULT: ICD-10-CM

## 2025-04-11 DIAGNOSIS — F33.42 RECURRENT MAJOR DEPRESSION IN COMPLETE REMISSION: ICD-10-CM

## 2025-04-11 LAB
ALBUMIN SERPL BCG-MCNC: 4.2 G/DL (ref 3.5–5.2)
ALBUMIN UR-MCNC: NEGATIVE MG/DL
ALP SERPL-CCNC: 85 U/L (ref 40–150)
ALT SERPL W P-5'-P-CCNC: 47 U/L (ref 0–50)
ANION GAP SERPL CALCULATED.3IONS-SCNC: 9 MMOL/L (ref 7–15)
APPEARANCE UR: CLEAR
AST SERPL W P-5'-P-CCNC: 24 U/L (ref 0–45)
BILIRUB SERPL-MCNC: 0.5 MG/DL
BILIRUB UR QL STRIP: NEGATIVE
BUN SERPL-MCNC: 13.7 MG/DL (ref 6–20)
CALCIUM SERPL-MCNC: 9.1 MG/DL (ref 8.8–10.4)
CHLORIDE SERPL-SCNC: 106 MMOL/L (ref 98–107)
CHOLEST SERPL-MCNC: 123 MG/DL
COLOR UR AUTO: ABNORMAL
CREAT SERPL-MCNC: 0.86 MG/DL (ref 0.51–0.95)
CREAT UR-MCNC: 71.9 MG/DL
EGFRCR SERPLBLD CKD-EPI 2021: 78 ML/MIN/1.73M2
ERYTHROCYTE [DISTWIDTH] IN BLOOD BY AUTOMATED COUNT: 12.6 % (ref 10–15)
EST. AVERAGE GLUCOSE BLD GHB EST-MCNC: 123 MG/DL
FASTING STATUS PATIENT QL REPORTED: YES
FASTING STATUS PATIENT QL REPORTED: YES
GLUCOSE SERPL-MCNC: 115 MG/DL (ref 70–99)
GLUCOSE UR STRIP-MCNC: >1000 MG/DL
HBA1C MFR BLD: 5.9 %
HCO3 SERPL-SCNC: 27 MMOL/L (ref 22–29)
HCT VFR BLD AUTO: 38.4 % (ref 35–47)
HDLC SERPL-MCNC: 47 MG/DL
HGB BLD-MCNC: 13.1 G/DL (ref 11.7–15.7)
HGB UR QL STRIP: NEGATIVE
KETONES UR STRIP-MCNC: NEGATIVE MG/DL
LDLC SERPL CALC-MCNC: 59 MG/DL
LEUKOCYTE ESTERASE UR QL STRIP: NEGATIVE
MCH RBC QN AUTO: 30.4 PG (ref 26.5–33)
MCHC RBC AUTO-ENTMCNC: 34.1 G/DL (ref 31.5–36.5)
MCV RBC AUTO: 89 FL (ref 78–100)
MICROALBUMIN UR-MCNC: <12 MG/L
MICROALBUMIN/CREAT UR: NORMAL MG/G{CREAT}
NITRATE UR QL: NEGATIVE
NONHDLC SERPL-MCNC: 76 MG/DL
PH UR STRIP: 5.5 [PH] (ref 5–9)
PLATELET # BLD AUTO: 252 10E3/UL (ref 150–450)
POTASSIUM SERPL-SCNC: 4.3 MMOL/L (ref 3.4–5.3)
PROT SERPL-MCNC: 6.9 G/DL (ref 6.4–8.3)
RBC # BLD AUTO: 4.31 10E6/UL (ref 3.8–5.2)
SODIUM SERPL-SCNC: 142 MMOL/L (ref 135–145)
SP GR UR STRIP: 1.02 (ref 1–1.03)
TRIGL SERPL-MCNC: 84 MG/DL
TSH SERPL DL<=0.005 MIU/L-ACNC: 2.8 UIU/ML (ref 0.3–4.2)
UROBILINOGEN UR STRIP-MCNC: NORMAL MG/DL
WBC # BLD AUTO: 6.5 10E3/UL (ref 4–11)

## 2025-04-11 PROCEDURE — 85041 AUTOMATED RBC COUNT: CPT | Mod: ZL | Performed by: INTERNAL MEDICINE

## 2025-04-11 PROCEDURE — 82043 UR ALBUMIN QUANTITATIVE: CPT | Mod: ZL | Performed by: INTERNAL MEDICINE

## 2025-04-11 PROCEDURE — 81003 URINALYSIS AUTO W/O SCOPE: CPT | Mod: ZL | Performed by: INTERNAL MEDICINE

## 2025-04-11 PROCEDURE — 83718 ASSAY OF LIPOPROTEIN: CPT | Mod: ZL | Performed by: INTERNAL MEDICINE

## 2025-04-11 PROCEDURE — 36415 COLL VENOUS BLD VENIPUNCTURE: CPT | Mod: ZL | Performed by: INTERNAL MEDICINE

## 2025-04-11 PROCEDURE — 82465 ASSAY BLD/SERUM CHOLESTEROL: CPT | Mod: ZL | Performed by: INTERNAL MEDICINE

## 2025-04-11 PROCEDURE — 82435 ASSAY OF BLOOD CHLORIDE: CPT | Mod: ZL | Performed by: INTERNAL MEDICINE

## 2025-04-11 PROCEDURE — 83036 HEMOGLOBIN GLYCOSYLATED A1C: CPT | Mod: ZL | Performed by: INTERNAL MEDICINE

## 2025-04-11 PROCEDURE — 85014 HEMATOCRIT: CPT | Mod: ZL | Performed by: INTERNAL MEDICINE

## 2025-04-11 PROCEDURE — 84443 ASSAY THYROID STIM HORMONE: CPT | Mod: ZL | Performed by: INTERNAL MEDICINE

## 2025-04-11 PROCEDURE — 82247 BILIRUBIN TOTAL: CPT | Mod: ZL | Performed by: INTERNAL MEDICINE

## 2025-04-11 RX ORDER — LISINOPRIL 20 MG/1
10 TABLET ORAL 2 TIMES DAILY
Qty: 90 TABLET | Refills: 4 | Status: SHIPPED | OUTPATIENT
Start: 2025-04-11

## 2025-04-11 ASSESSMENT — ENCOUNTER SYMPTOMS
SHORTNESS OF BREATH: 0
DYSURIA: 0
DIARRHEA: 0
NAUSEA: 0
VOMITING: 0
CONFUSION: 0
ARTHRALGIAS: 0
DIZZINESS: 0
MYALGIAS: 0
HEMATURIA: 0
FEVER: 0
AGITATION: 0
WHEEZING: 0
CHILLS: 0
COUGH: 0
ABDOMINAL PAIN: 0
LIGHT-HEADEDNESS: 0
BRUISES/BLEEDS EASILY: 0
WOUND: 0

## 2025-04-11 ASSESSMENT — PAIN SCALES - GENERAL: PAINLEVEL_OUTOF10: NO PAIN (0)

## 2025-04-11 NOTE — PATIENT INSTRUCTIONS
Blood pressure is well controlled.   Diabetes is well controlled.     Medications refilled.   Labs are stable.       Try to split your lisinopril tablets in half.  Take 10 mg in the morning, 10 mg in the evening.  If you continue to have low blood pressure episodes, lightheadedness symptoms with walking.  Given the weight loss, we may need to reduce this further.  Possibly down to just 10 mg once daily.    Lab work today.    Consider smaller plates, continue with reduced portions.  Regular diet.      To help with weight loss and improve blood sugar control....    -- Try to avoid Carbohydrates as much as possible -- breads, pasta, baked goods, cakes, oatmeal, cold cereal, potatoes.   -- Eat more lean meats, proteins, eggs, nuts, vegetables.    -- Start or Continue regular daily exercise.     Get out and exercise, bike ride, walk for 10 to 15 minutes after each meal -- this can significantly lowers the spike in blood sugar after eating.       Results for orders placed or performed in visit on 04/11/25   Urine Macroscopic with reflex to Microscopic     Status: Abnormal   Result Value Ref Range    Color Urine Light Yellow Colorless, Straw, Light Yellow, Yellow    Appearance Urine Clear Clear    Glucose Urine >1000 (A) Negative mg/dL    Bilirubin Urine Negative Negative    Ketones Urine Negative Negative mg/dL    Specific Gravity Urine 1.020 1.000 - 1.030    Blood Urine Negative Negative    pH Urine 5.5 5.0 - 9.0    Protein Albumin Urine Negative Negative mg/dL    Urobilinogen Urine Normal Normal mg/dL    Nitrite Urine Negative Negative    Leukocyte Esterase Urine Negative Negative    Narrative    Microscopic not indicated   TSH with free T4 reflex     Status: Normal   Result Value Ref Range    TSH 2.80 0.30 - 4.20 uIU/mL   Hemoglobin A1c     Status: Abnormal   Result Value Ref Range    Estimated Average Glucose 123 (H) <117 mg/dL    Hemoglobin A1C 5.9 (H) <5.7 %   CBC with platelets     Status: Normal   Result Value Ref  Range    WBC Count 6.5 4.0 - 11.0 10e3/uL    RBC Count 4.31 3.80 - 5.20 10e6/uL    Hemoglobin 13.1 11.7 - 15.7 g/dL    Hematocrit 38.4 35.0 - 47.0 %    MCV 89 78 - 100 fL    MCH 30.4 26.5 - 33.0 pg    MCHC 34.1 31.5 - 36.5 g/dL    RDW 12.6 10.0 - 15.0 %    Platelet Count 252 150 - 450 10e3/uL   Albumin Random Urine Quantitative with Creat Ratio     Status: None   Result Value Ref Range    Creatinine Urine mg/dL 71.9 mg/dL    Albumin Urine mg/L <12.0 mg/L    Albumin Urine mg/g Cr     Lipid Profile     Status: Abnormal   Result Value Ref Range    Cholesterol 123 <200 mg/dL    Triglycerides 84 <150 mg/dL    Direct Measure HDL 47 (L) >=50 mg/dL    LDL Cholesterol Calculated 59 <100 mg/dL    Non HDL Cholesterol 76 <130 mg/dL    Patient Fasting > 8hrs? Yes     Narrative    Cholesterol  Desirable: < 200 mg/dL  Borderline High: 200 - 239 mg/dL  High: >= 240 mg/dL    Triglycerides  Normal: < 150 mg/dL  Borderline High: 150 - 199 mg/dL  High: 200-499 mg/dL  Very High: >= 500 mg/dL    Direct Measure HDL  Female: >= 50 mg/dL   Male: >= 40 mg/dL    LDL Cholesterol  Desirable: < 100 mg/dL  Above Desirable: 100 - 129 mg/dL   Borderline High: 130 - 159 mg/dL   High:  160 - 189 mg/dL   Very High: >= 190 mg/dL    Non HDL Cholesterol  Desirable: < 130 mg/dL  Above Desirable: 130 - 159 mg/dL  Borderline High: 160 - 189 mg/dL  High: 190 - 219 mg/dL  Very High: >= 220 mg/dL   Comprehensive metabolic panel     Status: Abnormal   Result Value Ref Range    Sodium 142 135 - 145 mmol/L    Potassium 4.3 3.4 - 5.3 mmol/L    Carbon Dioxide (CO2) 27 22 - 29 mmol/L    Anion Gap 9 7 - 15 mmol/L    Urea Nitrogen 13.7 6.0 - 20.0 mg/dL    Creatinine 0.86 0.51 - 0.95 mg/dL    GFR Estimate 78 >60 mL/min/1.73m2    Calcium 9.1 8.8 - 10.4 mg/dL    Chloride 106 98 - 107 mmol/L    Glucose 115 (H) 70 - 99 mg/dL    Alkaline Phosphatase 85 40 - 150 U/L    AST 24 0 - 45 U/L    ALT 47 0 - 50 U/L    Protein Total 6.9 6.4 - 8.3 g/dL    Albumin 4.2 3.5 - 5.2 g/dL     Bilirubin Total 0.5 <=1.2 mg/dL    Patient Fasting > 8hrs? Yes       Aspects of Diabetes:   Recent Labs   Lab Test 04/11/25  0808 11/22/24  1516 05/08/24  1456 02/16/24  1056   A1C 5.9* 6.1* 7.2*  --    LDL 59 38 48  --    HDL 47* 43* 41*  --    TRIG 84 168* 134  --    ALT 47 43  --  41   CR 0.86 0.81 0.80 0.76   GFRESTIMATED 78 84 85 >90   POTASSIUM 4.3 3.5 4.1 3.7   TSH 2.80  --   --   --    WBC 6.5 8.1 9.2 8.8   HGB 13.1 13.7 13.4 12.5    278 269 265   ALBUMIN 4.2 4.5  --  4.6      Hemoglobin A1c  Goal range is under 8%. Best is 6.5 to 7   Blood Pressure 108/66 Goal to keep less than 140/90   Tobacco  reports that she quit smoking about 23 years ago. Her smoking use included cigarettes. She started smoking about 47 years ago. She has never used smokeless tobacco. Goal to abstain from tobacco   Aspirin or Plavix Anti-platelet therapy Aspirin or Plavix reduces risk of heart disease and stroke  -- sometimes used with other blood thinners, depending on bleeding risk and risk factors.    ACE/ARB Specific blood pressure meds These medications can reduce risk of kidney disease   Cholesterol Statins (Lipitor, Crestor, vs others) Statins reduce risk of heart disease and stroke   Eye Exam -- Do Yearly -- Annual diabetic eye exam   Healthy weight Wt Readings from Last 4 Encounters:   04/11/25 80.6 kg (177 lb 9.6 oz)   11/22/24 82.3 kg (181 lb 6.4 oz)   07/31/24 86 kg (189 lb 9.6 oz)   02/16/24 88 kg (193 lb 14.4 oz)      Body mass index is 30.48 kg/m .  Goal BMI under 30, best is under 25.      -- Trying to exercise daily (goal at least 20 min/day) with moderate aerobic activity   -- Eat healthy (resources from ADA at http://www.diabetes.org/)   -- Taking good care of my feet. Consider seeing the Podiatrist   -- Check blood sugars as directed, record in log book and bring to every appointment    Insurance [employer] are grading you and I on your blood sugar control -- Goal is to get your A1c down to 7.9% or lower and  NO Smoking!  -- Medicare and most insurance companies, will only cover Hemoglobin A1c labs to be rechecked every 91+ days.      Return for Diabetes labs and clinic follow-up appointment every 3 to 4 months.    Schedule lab only appointment --- A few days AFTER: 7/10/25   Schedule clinic appointment with Dr. Cornejo -- Same day as labs, or 1-2 days later.

## 2025-04-11 NOTE — PROGRESS NOTES
Assessment & Plan     ICD-10-CM    1. Type 2 diabetes mellitus with stage 2 chronic kidney disease, without long-term current use of insulin (H)  E11.22 Urine Macroscopic with reflex to Microscopic    N18.2 TSH with free T4 reflex     Hemoglobin A1c     CBC with platelets     Albumin Random Urine Quantitative with Creat Ratio     Comprehensive metabolic panel      2. Benign essential hypertension  I10 lisinopril (ZESTRIL) 20 MG tablet      3. Mixed hyperlipidemia  E78.2 Lipid Profile      4. Recurrent major depression in complete remission  F33.42       5. Class 1 obesity due to excess calories with serious comorbidity and body mass index (BMI) of 30.0 to 30.9 in adult  E66.811     E66.09     Z68.30          Patient presents for diabetes follow-up, as well as follow-up multiple issues.    Hemoglobin A1c is well-controlled.  Now down to 5.9%.  Standing orders updated.  Has been doing exceptionally well with Jardiance and Ozempic.  See below.    HYPERTENSION - Ongoing. Blood pressure is currently well controlled.  Medication side effects: None. Denies syncope or presyncope.  Continue current medications.   Medication list reviewed/updated. Refills completed as needed.      MIXED HYPERLIPIDEMIA.  Ongoing. LDL is at goal: Yes. Triglycerides are at goal: Yes.    Medication side effects reported: None.   Continue current medications for now. Medication list reviewed/updated. Refills completed as needed.  Recent Labs   Lab Test 04/11/25  0808 11/22/24  1516   CHOL 123 115   HDL 47* 43*   LDL 59 38   TRIG 84 168*        DEPRESSION - Patient has a long history of depression of moderate severity requiring medication for control.  Recent symptoms include: none.   Depression course: completely resolved.      OBESITY - Ongoing.  (See Encounter Diagnosis list above for obesity class / severity).  - Encourage continued maintenance / improvement in diet and exercise.   - Consider Nutrition / Dietician appointment.  - Weight loss  would improve Hypertension, Cholesterol and Diabetes.      Chronic Kidney Disease, Stage 2 (GFR 60-89), chronic, ongoing.  Kidney function had been slowly declining.  Encourage NSAID avoidance.    Recent Labs   Lab Test 04/11/25  0808 11/22/24  1516   CR 0.86 0.81   GFRESTIMATED 78 84        Vaccine counseling completed.  Encourage routine / annual vaccinations.    Type 2 Diabetes Mellitus, with nephropathy.  Blood sugar control has been good with minimal hyperglycemia. Doing well with diet, oral agents, exercise, and Ozempic - xNO Insulin injections per day.  Medication list reviewed/updated. Refills completed as needed.    Complicating factors include but are not limited to: hypertension, hyperlipidemia, and chronic kidney disease.     Recent Labs   Lab Test 04/11/25  0808 11/22/24  1516 05/08/24  1456 02/16/24  1056   A1C 5.9* 6.1* 7.2*  --    LDL 59 38 48  --    HDL 47* 43* 41*  --    TRIG 84 168* 134  --    ALT 47 43  --  41   CR 0.86 0.81 0.80 0.76   GFRESTIMATED 78 84 85 >90   POTASSIUM 4.3 3.5 4.1 3.7   TSH 2.80  --   --   --    WBC 6.5 8.1 9.2 8.8   HGB 13.1 13.7 13.4 12.5    278 269 265   ALBUMIN 4.2 4.5  --  4.6     Results for orders placed or performed in visit on 04/11/25   Urine Macroscopic with reflex to Microscopic     Status: Abnormal   Result Value Ref Range    Color Urine Light Yellow Colorless, Straw, Light Yellow, Yellow    Appearance Urine Clear Clear    Glucose Urine >1000 (A) Negative mg/dL    Bilirubin Urine Negative Negative    Ketones Urine Negative Negative mg/dL    Specific Gravity Urine 1.020 1.000 - 1.030    Blood Urine Negative Negative    pH Urine 5.5 5.0 - 9.0    Protein Albumin Urine Negative Negative mg/dL    Urobilinogen Urine Normal Normal mg/dL    Nitrite Urine Negative Negative    Leukocyte Esterase Urine Negative Negative    Narrative    Microscopic not indicated   TSH with free T4 reflex     Status: Normal   Result Value Ref Range    TSH 2.80 0.30 - 4.20 uIU/mL    Hemoglobin A1c     Status: Abnormal   Result Value Ref Range    Estimated Average Glucose 123 (H) <117 mg/dL    Hemoglobin A1C 5.9 (H) <5.7 %   CBC with platelets     Status: Normal   Result Value Ref Range    WBC Count 6.5 4.0 - 11.0 10e3/uL    RBC Count 4.31 3.80 - 5.20 10e6/uL    Hemoglobin 13.1 11.7 - 15.7 g/dL    Hematocrit 38.4 35.0 - 47.0 %    MCV 89 78 - 100 fL    MCH 30.4 26.5 - 33.0 pg    MCHC 34.1 31.5 - 36.5 g/dL    RDW 12.6 10.0 - 15.0 %    Platelet Count 252 150 - 450 10e3/uL   Albumin Random Urine Quantitative with Creat Ratio     Status: None   Result Value Ref Range    Creatinine Urine mg/dL 71.9 mg/dL    Albumin Urine mg/L <12.0 mg/L    Albumin Urine mg/g Cr     Lipid Profile     Status: Abnormal   Result Value Ref Range    Cholesterol 123 <200 mg/dL    Triglycerides 84 <150 mg/dL    Direct Measure HDL 47 (L) >=50 mg/dL    LDL Cholesterol Calculated 59 <100 mg/dL    Non HDL Cholesterol 76 <130 mg/dL    Patient Fasting > 8hrs? Yes     Narrative    Cholesterol  Desirable: < 200 mg/dL  Borderline High: 200 - 239 mg/dL  High: >= 240 mg/dL    Triglycerides  Normal: < 150 mg/dL  Borderline High: 150 - 199 mg/dL  High: 200-499 mg/dL  Very High: >= 500 mg/dL    Direct Measure HDL  Female: >= 50 mg/dL   Male: >= 40 mg/dL    LDL Cholesterol  Desirable: < 100 mg/dL  Above Desirable: 100 - 129 mg/dL   Borderline High: 130 - 159 mg/dL   High:  160 - 189 mg/dL   Very High: >= 190 mg/dL    Non HDL Cholesterol  Desirable: < 130 mg/dL  Above Desirable: 130 - 159 mg/dL  Borderline High: 160 - 189 mg/dL  High: 190 - 219 mg/dL  Very High: >= 220 mg/dL   Comprehensive metabolic panel     Status: Abnormal   Result Value Ref Range    Sodium 142 135 - 145 mmol/L    Potassium 4.3 3.4 - 5.3 mmol/L    Carbon Dioxide (CO2) 27 22 - 29 mmol/L    Anion Gap 9 7 - 15 mmol/L    Urea Nitrogen 13.7 6.0 - 20.0 mg/dL    Creatinine 0.86 0.51 - 0.95 mg/dL    GFR Estimate 78 >60 mL/min/1.73m2    Calcium 9.1 8.8 - 10.4 mg/dL    Chloride  106 98 - 107 mmol/L    Glucose 115 (H) 70 - 99 mg/dL    Alkaline Phosphatase 85 40 - 150 U/L    AST 24 0 - 45 U/L    ALT 47 0 - 50 U/L    Protein Total 6.9 6.4 - 8.3 g/dL    Albumin 4.2 3.5 - 5.2 g/dL    Bilirubin Total 0.5 <=1.2 mg/dL    Patient Fasting > 8hrs? Yes       Random glucose elevated at 115.  Liver enzymes normal.  Creatinine 0.86 with GFR of 78.  HDL is low.  LDL and triglycerides are at goal.  Urine protein levels are normal.  CBC normal.  Hemoglobin A1c 5.9%.  TSH normal.  Urine glucose elevated, anticipated given Jardiance use.    The longitudinal plan of care for the diagnosis(es)/condition(s) as documented were addressed during this visit. Due to the added complexity in care, I will continue to support Liudmila in the subsequent management and with ongoing continuity of care.                   Return in about 3 months (around 7/11/2025) for - Labs every 91+ days, with DM Follow-up, Same Day or 1-2 days later with Dr. Cornejo.      Stoney Cornejo MD  [facility]    Review of Systems   Constitutional:  Negative for chills and fever.   HENT:  Negative for congestion and hearing loss.    Eyes:  Negative for visual disturbance.   Respiratory:  Negative for cough, shortness of breath and wheezing.    Cardiovascular:  Negative for chest pain.   Gastrointestinal:  Negative for abdominal pain, diarrhea, nausea and vomiting.   Endocrine: Negative for cold intolerance and heat intolerance.   Genitourinary:  Negative for dysuria and hematuria.   Musculoskeletal:  Negative for arthralgias and myalgias.   Skin:  Negative for rash and wound.   Allergic/Immunologic: Negative for immunocompromised state.   Neurological:  Negative for dizziness and light-headedness.   Hematological:  Does not bruise/bleed easily.   Psychiatric/Behavioral:  Negative for agitation and confusion.          Subjective   Liudmila is a 59 year old, presenting for the following health issues:  Diabetes        4/11/2025     8:18  AM   Additional Questions   Roomed by HALEY Chapa   Accompanied by Self         4/11/2025     8:18 AM   Patient Reported Additional Medications   Patient reports taking the following new medications N/A     History of Present Illness       She eats 0-1 servings of fruits and vegetables daily.She consumes 0 sweetened beverage(s) daily.She exercises with enough effort to increase her heart rate 10 to 19 minutes per day.  She exercises with enough effort to increase her heart rate 5 days per week.   She is taking medications regularly.          Diabetes Follow-up    How often are you checking your blood sugar? One time daily  What time of day are you checking your blood sugars (select all that apply)?  Before meals  Have you had any blood sugars above 200?  No  Have you had any blood sugars below 70?  No  What symptoms do you notice when your blood sugar is low?  Shaky  What concerns do you have today about your diabetes? None and Other: Ozempic side effects   Do you have any of these symptoms? (Select all that apply)  Weight loss      BP Readings from Last 2 Encounters:   04/11/25 108/66   11/22/24 106/60     Hemoglobin A1C (%)   Date Value   04/11/2025 5.9 (H)   11/22/2024 6.1 (H)   05/04/2021 8.0 (H)   01/22/2021 7.2 (H)     LDL Cholesterol Calculated (mg/dL)   Date Value   04/11/2025 59   11/22/2024 38   01/22/2021 70   07/29/2020 60         How many servings of fruits and vegetables do you eat daily?  0-1  On average, how many sweetened beverages do you drink each day (Examples: soda, juice, sweet tea, etc.  Do NOT count diet or artificially sweetened beverages)?   0  How many days per week do you exercise enough to make your heart beat faster? 5  How many minutes a day do you exercise enough to make your heart beat faster? 10 - 19  How many days per week do you miss taking your medication? 0            Objective    /66   Pulse 60   Temp 97.6  F (36.4  C) (Temporal)   Resp 14   Wt 80.6 kg (177 lb 9.6  oz)   LMP 07/27/2006 (Approximate)   SpO2 98%   BMI 30.48 kg/m    Body mass index is 30.48 kg/m .  Physical Exam  Constitutional:       General: She is not in acute distress.     Appearance: Normal appearance. She is well-developed. She is not ill-appearing.   Eyes:      General: No scleral icterus.     Conjunctiva/sclera: Conjunctivae normal.   Neck:      Vascular: No carotid bruit.   Cardiovascular:      Rate and Rhythm: Normal rate and regular rhythm.      Pulses: Normal pulses.   Pulmonary:      Effort: Pulmonary effort is normal. No respiratory distress.      Breath sounds: No wheezing.   Abdominal:      Palpations: Abdomen is soft.      Tenderness: There is no abdominal tenderness.   Musculoskeletal:         General: No tenderness or deformity.      Right lower leg: No edema.      Left lower leg: No edema.   Skin:     General: Skin is warm and dry.      Findings: No rash.   Neurological:      Mental Status: She is alert. Mental status is at baseline.      Cranial Nerves: No cranial nerve deficit.   Psychiatric:         Mood and Affect: Mood normal.         Behavior: Behavior normal.                    Signed Electronically by: Stoney Cornejo MD

## 2025-04-11 NOTE — NURSING NOTE
"Chief Complaint   Patient presents with    Diabetes       Initial /66   Pulse 60   Temp 97.6  F (36.4  C) (Temporal)   Resp 14   Wt 80.6 kg (177 lb 9.6 oz)   LMP 07/27/2006 (Approximate)   SpO2 98%   BMI 30.48 kg/m   Estimated body mass index is 30.48 kg/m  as calculated from the following:    Height as of 11/22/24: 1.626 m (5' 4\").    Weight as of this encounter: 80.6 kg (177 lb 9.6 oz).  Medication Reconciliation: complete        "

## 2025-04-29 ENCOUNTER — MYC REFILL (OUTPATIENT)
Dept: INTERNAL MEDICINE | Facility: OTHER | Age: 60
End: 2025-04-29
Payer: COMMERCIAL

## 2025-04-29 DIAGNOSIS — I10 BENIGN ESSENTIAL HYPERTENSION: ICD-10-CM

## 2025-04-29 RX ORDER — LISINOPRIL 10 MG/1
10 TABLET ORAL 2 TIMES DAILY
Qty: 180 TABLET | Refills: 3 | Status: SHIPPED | OUTPATIENT
Start: 2025-04-29

## 2025-04-29 RX ORDER — LISINOPRIL 20 MG/1
10 TABLET ORAL 2 TIMES DAILY
Qty: 90 TABLET | Refills: 4 | Status: CANCELLED | OUTPATIENT
Start: 2025-04-29

## 2025-04-29 NOTE — TELEPHONE ENCOUNTER
Takes her Lisinopril 20mg 1/2 tablet BID.      Requesting 10mg BID.     Osman'd up as requested.      4/11/25  LOV with Chantal for Type 2 DM    Simi Pederson RN on 4/29/2025 at 1:35 PM

## 2025-06-18 ENCOUNTER — HOSPITAL ENCOUNTER (OUTPATIENT)
Dept: MAMMOGRAPHY | Facility: OTHER | Age: 60
Discharge: HOME | End: 2025-06-18
Attending: INTERNAL MEDICINE
Payer: COMMERCIAL

## 2025-06-18 DIAGNOSIS — Z12.31 VISIT FOR SCREENING MAMMOGRAM: ICD-10-CM

## 2025-06-18 PROCEDURE — 77063 BREAST TOMOSYNTHESIS BI: CPT | Mod: 26 | Performed by: RADIOLOGY

## 2025-06-18 PROCEDURE — 77067 SCR MAMMO BI INCL CAD: CPT | Mod: 26 | Performed by: RADIOLOGY

## 2025-06-18 PROCEDURE — 77063 BREAST TOMOSYNTHESIS BI: CPT

## 2025-07-14 ENCOUNTER — PATIENT OUTREACH (OUTPATIENT)
Dept: GASTROENTEROLOGY | Facility: CLINIC | Age: 60
End: 2025-07-14
Payer: COMMERCIAL

## 2025-07-14 DIAGNOSIS — Z12.11 SPECIAL SCREENING FOR MALIGNANT NEOPLASMS, COLON: Primary | ICD-10-CM

## 2025-07-14 NOTE — PROGRESS NOTES
"[facility] Screening Colonoscopy Referral Review    Patient meets the inclusion criteria for screening colonoscopy standing order.    Ordering/Referring Provider:  Stoney Cornejo      BMI: Estimated body mass index is 30.48 kg/m  as calculated from the following:    Height as of 11/22/24: 1.626 m (5' 4\").    Weight as of 4/11/25: 80.6 kg (177 lb 9.6 oz).     Sedation:  Does patient have any of the following conditions affecting sedation?  No medical conditions affecting sedation.    Previous Scopes:  Any previous recommendations or follow up needs based on previous scope?  na / No recommendations.    Medical Concerns to Postpone Order:  Does patient have any of the following medical concerns that should postpone/delay colonoscopy referral?  No medical conditions affecting colonoscopy referral.    Final Referral Details:  Based on patient's medical history patient is appropriate for referral order with moderate sedation. If patient's BMI > 50 do not schedule in [facility].  "

## 2025-07-17 DIAGNOSIS — Z12.11 ENCOUNTER FOR SCREENING COLONOSCOPY: ICD-10-CM

## 2025-07-17 DIAGNOSIS — Z86.0101 H/O ADENOMATOUS POLYP OF COLON: Primary | ICD-10-CM

## 2025-07-17 NOTE — TELEPHONE ENCOUNTER
Screening Questions for the Scheduling of Screening Colonoscopies   (If Colonoscopy is diagnostic, Provider should review the chart before scheduling.)  Are you younger than 45 or older than 80?  NO  Do you take aspirin or fish oil?  NO (if yes, tell patient to stop 1 week prior to Colonoscopy)  Do you take warfarin (Coumadin), clopidogrel (Plavix), apixaban (Eliquis), dabigatram (Pradaxa), rivaroxaban (Xarelto) or any blood thinner? NO  Do you take semaglutide (Ozempic or Wegovy), tirzepatide (Mounjaro or Zepbound), liraglutide (Victoza), or dulaglutide (Trulicity)? OZEMPIC - Takes on Sundays.  Told to skip 09/21/2025 dose.  Do you use oxygen or a CPAP at home?  NO  Do you have kidney disease? NO  Are you on dialysis? NO  Have you had a stroke or heart attack in the last year? NO  Have you had a stent in your heart or any blood vessel in the last year? NO  Have you had a transplant of any organ? NO  Have you had a colonoscopy or upper endoscopy (EGD) before? YES         When?  2020  Date of scheduled Colonoscopy. 09/22/2025  Provider TYLER  Pharmacy [facility]  Order Priority ROUTINE

## 2025-07-21 RX ORDER — BISACODYL 5 MG/1
TABLET, DELAYED RELEASE ORAL
Qty: 2 TABLET | Refills: 0 | Status: SHIPPED | OUTPATIENT
Start: 2025-09-15

## 2025-07-21 RX ORDER — POLYETHYLENE GLYCOL 3350, SODIUM CHLORIDE, SODIUM BICARBONATE, POTASSIUM CHLORIDE 420; 11.2; 5.72; 1.48 G/4L; G/4L; G/4L; G/4L
4000 POWDER, FOR SOLUTION ORAL ONCE
Qty: 4000 ML | Refills: 0 | Status: SHIPPED | OUTPATIENT
Start: 2025-09-15 | End: 2025-09-15

## 2025-08-01 ENCOUNTER — LAB (OUTPATIENT)
Dept: LAB | Facility: OTHER | Age: 60
End: 2025-08-01
Attending: INTERNAL MEDICINE
Payer: COMMERCIAL

## 2025-08-01 DIAGNOSIS — N18.2 TYPE 2 DIABETES MELLITUS WITH STAGE 2 CHRONIC KIDNEY DISEASE, WITHOUT LONG-TERM CURRENT USE OF INSULIN (H): ICD-10-CM

## 2025-08-01 DIAGNOSIS — E78.2 MIXED HYPERLIPIDEMIA: ICD-10-CM

## 2025-08-01 DIAGNOSIS — E11.22 TYPE 2 DIABETES MELLITUS WITH STAGE 2 CHRONIC KIDNEY DISEASE, WITHOUT LONG-TERM CURRENT USE OF INSULIN (H): ICD-10-CM

## 2025-08-01 PROBLEM — E53.8 VITAMIN B12 DEFICIENCY: Status: ACTIVE | Noted: 2025-08-01

## 2025-08-01 LAB
ALBUMIN SERPL BCG-MCNC: 4.2 G/DL (ref 3.5–5.2)
ALBUMIN UR-MCNC: NEGATIVE MG/DL
ALP SERPL-CCNC: 85 U/L (ref 40–150)
ALT SERPL W P-5'-P-CCNC: 42 U/L (ref 0–50)
ANION GAP SERPL CALCULATED.3IONS-SCNC: 9 MMOL/L (ref 7–15)
APPEARANCE UR: CLEAR
AST SERPL W P-5'-P-CCNC: 28 U/L (ref 0–45)
BILIRUB SERPL-MCNC: 0.5 MG/DL
BILIRUB UR QL STRIP: NEGATIVE
BUN SERPL-MCNC: 15.9 MG/DL (ref 6–20)
CALCIUM SERPL-MCNC: 9 MG/DL (ref 8.8–10.4)
CHLORIDE SERPL-SCNC: 106 MMOL/L (ref 98–107)
CHOLEST SERPL-MCNC: 131 MG/DL
COLOR UR AUTO: ABNORMAL
CREAT SERPL-MCNC: 0.74 MG/DL (ref 0.51–0.95)
CREAT UR-MCNC: 69.2 MG/DL
EGFRCR SERPLBLD CKD-EPI 2021: >90 ML/MIN/1.73M2
ERYTHROCYTE [DISTWIDTH] IN BLOOD BY AUTOMATED COUNT: 12.7 % (ref 10–15)
EST. AVERAGE GLUCOSE BLD GHB EST-MCNC: 131 MG/DL
FASTING STATUS PATIENT QL REPORTED: YES
FASTING STATUS PATIENT QL REPORTED: YES
GLUCOSE SERPL-MCNC: 129 MG/DL (ref 70–99)
GLUCOSE UR STRIP-MCNC: >1000 MG/DL
HBA1C MFR BLD: 6.2 %
HCO3 SERPL-SCNC: 27 MMOL/L (ref 22–29)
HCT VFR BLD AUTO: 39 % (ref 35–47)
HDLC SERPL-MCNC: 49 MG/DL
HGB BLD-MCNC: 13.2 G/DL (ref 11.7–15.7)
HGB UR QL STRIP: NEGATIVE
KETONES UR STRIP-MCNC: NEGATIVE MG/DL
LDLC SERPL CALC-MCNC: 54 MG/DL
LEUKOCYTE ESTERASE UR QL STRIP: NEGATIVE
MCH RBC QN AUTO: 30.3 PG (ref 26.5–33)
MCHC RBC AUTO-ENTMCNC: 33.8 G/DL (ref 31.5–36.5)
MCV RBC AUTO: 90 FL (ref 78–100)
MICROALBUMIN UR-MCNC: <12 MG/L
MICROALBUMIN/CREAT UR: NORMAL MG/G{CREAT}
NITRATE UR QL: NEGATIVE
NONHDLC SERPL-MCNC: 82 MG/DL
PH UR STRIP: 5 [PH] (ref 5–9)
PLATELET # BLD AUTO: 255 10E3/UL (ref 150–450)
POTASSIUM SERPL-SCNC: 4.3 MMOL/L (ref 3.4–5.3)
PROT SERPL-MCNC: 6.8 G/DL (ref 6.4–8.3)
RBC # BLD AUTO: 4.35 10E6/UL (ref 3.8–5.2)
SODIUM SERPL-SCNC: 142 MMOL/L (ref 135–145)
SP GR UR STRIP: 1.03 (ref 1–1.03)
TRIGL SERPL-MCNC: 142 MG/DL
TSH SERPL DL<=0.005 MIU/L-ACNC: 3.48 UIU/ML (ref 0.3–4.2)
UROBILINOGEN UR STRIP-MCNC: NORMAL MG/DL
WBC # BLD AUTO: 7.3 10E3/UL (ref 4–11)

## 2025-08-01 PROCEDURE — 85027 COMPLETE CBC AUTOMATED: CPT | Mod: ZL

## 2025-08-01 PROCEDURE — 36415 COLL VENOUS BLD VENIPUNCTURE: CPT | Mod: ZL

## 2025-08-01 PROCEDURE — 82040 ASSAY OF SERUM ALBUMIN: CPT | Mod: ZL

## 2025-08-01 PROCEDURE — 84443 ASSAY THYROID STIM HORMONE: CPT | Mod: ZL

## 2025-08-01 PROCEDURE — 83036 HEMOGLOBIN GLYCOSYLATED A1C: CPT | Mod: ZL

## 2025-08-01 PROCEDURE — 81003 URINALYSIS AUTO W/O SCOPE: CPT | Mod: ZL

## 2025-08-01 PROCEDURE — 82465 ASSAY BLD/SERUM CHOLESTEROL: CPT | Mod: ZL

## 2025-08-01 PROCEDURE — 82570 ASSAY OF URINE CREATININE: CPT | Mod: ZL

## (undated) DEVICE — ENDO KIT COMPLIANCE DYKENDOCMPLY

## (undated) DEVICE — TUBING SUCTION 10'X3/16" N510

## (undated) DEVICE — SUCTION MANIFOLD NEPTUNE 2 SYS 4 PORT 0702-020-000

## (undated) DEVICE — ENDO FORCEP ENDOJAW BIOPSY 2.8MMX230CM FB-220U

## (undated) DEVICE — SYR 50ML LL W/O NDL 309653

## (undated) DEVICE — HEMOCLIP QUICKCLIP PRO OLYMPUS 230CM HX-202UR.B

## (undated) DEVICE — SOL WATER 1500ML

## (undated) DEVICE — ESU ENDO FORCEP BX HOT FD-210U

## (undated) DEVICE — ESU GROUND PAD ADULT W/CORD E7507

## (undated) DEVICE — ENDO BRUSH CHANNEL MASTER CLEANING 2-4.2MM BW-412T

## (undated) DEVICE — ENDO BITE BLOCK 60 MAXI LF 00712804

## (undated) RX ORDER — HYDROCODONE BITARTRATE AND ACETAMINOPHEN 5; 325 MG/1; MG/1
TABLET ORAL
Status: DISPENSED
Start: 2023-12-08

## (undated) RX ORDER — PROPOFOL 10 MG/ML
INJECTION, EMULSION INTRAVENOUS
Status: DISPENSED
Start: 2023-03-29

## (undated) RX ORDER — PROPOFOL 10 MG/ML
INJECTION, EMULSION INTRAVENOUS
Status: DISPENSED
Start: 2020-10-12

## (undated) RX ORDER — LIDOCAINE HYDROCHLORIDE AND EPINEPHRINE 10; 10 MG/ML; UG/ML
INJECTION, SOLUTION INFILTRATION; PERINEURAL
Status: DISPENSED
Start: 2019-03-12

## (undated) RX ORDER — LIDOCAINE HYDROCHLORIDE 10 MG/ML
INJECTION, SOLUTION INFILTRATION; PERINEURAL
Status: DISPENSED
Start: 2019-03-12